# Patient Record
Sex: FEMALE | Race: ASIAN | NOT HISPANIC OR LATINO | Employment: OTHER | URBAN - METROPOLITAN AREA
[De-identification: names, ages, dates, MRNs, and addresses within clinical notes are randomized per-mention and may not be internally consistent; named-entity substitution may affect disease eponyms.]

---

## 2018-06-01 LAB
ABSOL LYMPHOCYTES (HISTORICAL): 1.8 K/UL (ref 0.5–4)
ALBUMIN SERPL BCP-MCNC: 4.4 G/DL (ref 3–5.2)
ALP SERPL-CCNC: 70 U/L (ref 43–122)
ALT SERPL W P-5'-P-CCNC: 30 U/L (ref 9–52)
ANION GAP SERPL CALCULATED.3IONS-SCNC: 9 MMOL/L (ref 5–14)
AST SERPL W P-5'-P-CCNC: 21 U/L (ref 14–36)
BASOPHILS # BLD AUTO: 0 % (ref 0–1)
BASOPHILS # BLD AUTO: 0 K/UL (ref 0–0.1)
BILIRUB SERPL-MCNC: 0.6 MG/DL
BUN SERPL-MCNC: 18 MG/DL (ref 5–25)
CALCIUM SERPL-MCNC: 9.4 MG/DL (ref 8.4–10.2)
CHLORIDE SERPL-SCNC: 107 MEQ/L (ref 97–108)
CHOLEST SERPL-MCNC: 191 MG/DL
CHOLEST/HDLC SERPL: 2.9 {RATIO}
CK SERPL-CCNC: 122 U/L (ref 30–135)
CO2 SERPL-SCNC: 27 MMOL/L (ref 22–30)
CREATINE, SERUM (HISTORICAL): 0.7 MG/DL (ref 0.6–1.2)
CREATININE, RANDOM URINE (HISTORICAL): 13.5 MG/DL (ref 50–200)
DEPRECATED RDW RBC AUTO: 14.1 %
EGFR (HISTORICAL): >60 ML/MIN/1.73 M2
EOSINOPHIL # BLD AUTO: 0.1 K/UL (ref 0–0.4)
EOSINOPHIL NFR BLD AUTO: 2 % (ref 0–6)
EST. AVERAGE GLUCOSE BLD GHB EST-MCNC: 183 MG/DL
GLUCOSE FASTING (HISTORICAL): 160 MG/DL (ref 70–99)
HBA1C MFR BLD HPLC: 8 %
HCT VFR BLD AUTO: 39.2 % (ref 36–46)
HDLC SERPL-MCNC: 65 MG/DL
HGB BLD-MCNC: 13.1 G/DL (ref 12–16)
LDL/HDL RATIO (HISTORICAL): 1.6
LDLC SERPL CALC-MCNC: 101 MG/DL
LYMPHOCYTES NFR BLD AUTO: 24 % (ref 25–45)
MCH RBC QN AUTO: 30.1 PG (ref 26–34)
MCHC RBC AUTO-ENTMCNC: 33.4 % (ref 31–36)
MCV RBC AUTO: 90 FL (ref 80–100)
MICROALBUM.,U,RANDOM (HISTORICAL): <0.6 MG/DL
MICROALBUMIN/CREATININE RATIO (HISTORICAL): ABNORMAL
MONOCYTES # BLD AUTO: 0.5 K/UL (ref 0.2–0.9)
MONOCYTES NFR BLD AUTO: 6 % (ref 1–10)
NEUTROPHILS ABS COUNT (HISTORICAL): 5 K/UL (ref 1.8–7.8)
NEUTS SEG NFR BLD AUTO: 68 % (ref 45–65)
PLATELET # BLD AUTO: 225 K/MCL (ref 150–450)
POTASSIUM SERPL-SCNC: 4.7 MEQ/L (ref 3.6–5)
RBC # BLD AUTO: 4.35 M/MCL (ref 4–5.2)
SODIUM SERPL-SCNC: 142 MEQ/L (ref 137–147)
TOTAL PROTEIN (HISTORICAL): 7.7 G/DL (ref 5.9–8.4)
TRIGL SERPL-MCNC: 127 MG/DL
VLDLC SERPL CALC-MCNC: 25 MG/DL (ref 0–40)
WBC # BLD AUTO: 7.4 K/MCL (ref 4.5–11)

## 2018-10-10 ENCOUNTER — TRANSCRIBE ORDERS (OUTPATIENT)
Dept: ADMINISTRATIVE | Facility: HOSPITAL | Age: 73
End: 2018-10-10

## 2018-10-10 DIAGNOSIS — Z12.39 SCREENING BREAST EXAMINATION: Primary | ICD-10-CM

## 2018-11-23 ENCOUNTER — HOSPITAL ENCOUNTER (OUTPATIENT)
Dept: RADIOLOGY | Age: 73
Discharge: HOME | End: 2018-11-23
Payer: COMMERCIAL

## 2018-11-23 VITALS — BODY MASS INDEX: 23.21 KG/M2 | HEIGHT: 63 IN | WEIGHT: 131 LBS

## 2018-11-23 DIAGNOSIS — Z12.39 SCREENING BREAST EXAMINATION: ICD-10-CM

## 2018-11-23 PROCEDURE — 77063 BREAST TOMOSYNTHESIS BI: CPT

## 2018-11-23 PROCEDURE — 77067 SCR MAMMO BI INCL CAD: CPT

## 2018-12-07 ENCOUNTER — HOSPITAL ENCOUNTER (OUTPATIENT)
Dept: ULTRASOUND IMAGING | Facility: CLINIC | Age: 73
Discharge: HOME | End: 2018-12-07
Payer: COMMERCIAL

## 2018-12-07 ENCOUNTER — HOSPITAL ENCOUNTER (OUTPATIENT)
Dept: MAMMOGRAPHY | Facility: CLINIC | Age: 73
Discharge: HOME | End: 2018-12-07
Payer: COMMERCIAL

## 2018-12-07 ENCOUNTER — TRANSCRIBE ORDERS (OUTPATIENT)
Dept: ADMINISTRATIVE | Facility: HOSPITAL | Age: 73
End: 2018-12-07

## 2018-12-07 DIAGNOSIS — R92.8 ABNORMAL MAMMOGRAM: ICD-10-CM

## 2018-12-07 DIAGNOSIS — R92.8 FOLLOW-UP EXAMINATION OF ABNORMAL MAMMOGRAM: Primary | ICD-10-CM

## 2018-12-07 PROCEDURE — 76642 ULTRASOUND BREAST LIMITED: CPT

## 2018-12-07 PROCEDURE — 77066 DX MAMMO INCL CAD BI: CPT

## 2018-12-07 PROCEDURE — G0279 TOMOSYNTHESIS, MAMMO: HCPCS

## 2019-05-17 ENCOUNTER — HOSPITAL ENCOUNTER (OUTPATIENT)
Dept: MAMMOGRAPHY | Facility: CLINIC | Age: 74
Discharge: HOME | End: 2019-05-17
Payer: COMMERCIAL

## 2019-05-17 ENCOUNTER — TRANSCRIBE ORDERS (OUTPATIENT)
Dept: ADMINISTRATIVE | Facility: HOSPITAL | Age: 74
End: 2019-05-17

## 2019-05-17 ENCOUNTER — HOSPITAL ENCOUNTER (OUTPATIENT)
Dept: ULTRASOUND IMAGING | Facility: CLINIC | Age: 74
Discharge: HOME | End: 2019-05-17
Payer: COMMERCIAL

## 2019-05-17 VITALS — HEIGHT: 63 IN | WEIGHT: 131 LBS | BODY MASS INDEX: 23.21 KG/M2

## 2019-05-17 DIAGNOSIS — R92.8 ABNORMAL MAMMOGRAM: Primary | ICD-10-CM

## 2019-05-17 DIAGNOSIS — R92.8 FOLLOW-UP EXAMINATION OF ABNORMAL MAMMOGRAM: ICD-10-CM

## 2019-05-17 PROCEDURE — 77066 DX MAMMO INCL CAD BI: CPT

## 2019-05-17 PROCEDURE — 76642 ULTRASOUND BREAST LIMITED: CPT

## 2019-05-17 PROCEDURE — G0279 TOMOSYNTHESIS, MAMMO: HCPCS

## 2019-09-05 PROBLEM — K21.9 GASTROESOPHAGEAL REFLUX DISEASE WITHOUT ESOPHAGITIS: Status: ACTIVE | Noted: 2019-09-05

## 2019-09-05 PROBLEM — E78.2 MIXED HYPERLIPIDEMIA: Status: ACTIVE | Noted: 2019-09-05

## 2019-09-05 PROBLEM — I10 ESSENTIAL HYPERTENSION: Status: ACTIVE | Noted: 2019-09-05

## 2019-09-05 PROBLEM — E11.9 DIABETES MELLITUS (HCC): Status: ACTIVE | Noted: 2019-09-05

## 2019-09-05 PROBLEM — M17.0 OSTEOARTHRITIS OF BOTH KNEES: Status: ACTIVE | Noted: 2019-09-05

## 2019-09-05 PROBLEM — J06.9 VIRAL UPPER RESPIRATORY TRACT INFECTION: Status: ACTIVE | Noted: 2019-09-05

## 2019-12-03 ENCOUNTER — HOSPITAL ENCOUNTER (OUTPATIENT)
Dept: MAMMOGRAPHY | Facility: CLINIC | Age: 74
Discharge: HOME | End: 2019-12-03
Payer: COMMERCIAL

## 2019-12-03 ENCOUNTER — HOSPITAL ENCOUNTER (OUTPATIENT)
Dept: ULTRASOUND IMAGING | Facility: CLINIC | Age: 74
Discharge: HOME | End: 2019-12-03
Payer: COMMERCIAL

## 2019-12-03 ENCOUNTER — TRANSCRIBE ORDERS (OUTPATIENT)
Dept: MAMMOGRAPHY | Facility: CLINIC | Age: 74
End: 2019-12-03

## 2019-12-03 VITALS — BODY MASS INDEX: 26.9 KG/M2 | WEIGHT: 137 LBS | HEIGHT: 60 IN

## 2019-12-03 DIAGNOSIS — R92.8 ABNORMAL ULTRASOUND OF BREAST: Primary | ICD-10-CM

## 2019-12-03 DIAGNOSIS — R92.8 ABNORMAL MAMMOGRAM: ICD-10-CM

## 2019-12-03 PROCEDURE — G0279 TOMOSYNTHESIS, MAMMO: HCPCS

## 2019-12-03 PROCEDURE — 76642 ULTRASOUND BREAST LIMITED: CPT

## 2019-12-03 PROCEDURE — 77066 DX MAMMO INCL CAD BI: CPT

## 2020-01-08 ENCOUNTER — LAB (OUTPATIENT)
Dept: LAB | Facility: HOSPITAL | Age: 75
End: 2020-01-08
Payer: COMMERCIAL

## 2020-01-08 ENCOUNTER — TRANSCRIBE ORDERS (OUTPATIENT)
Dept: ADMINISTRATIVE | Facility: HOSPITAL | Age: 75
End: 2020-01-08

## 2020-01-08 DIAGNOSIS — G44.52 NEW DAILY PERSISTENT HEADACHE: ICD-10-CM

## 2020-01-08 DIAGNOSIS — E78.2 MIXED HYPERLIPIDEMIA: ICD-10-CM

## 2020-01-08 DIAGNOSIS — I10 ESSENTIAL HYPERTENSION: ICD-10-CM

## 2020-01-08 DIAGNOSIS — E11.69 TYPE 2 DIABETES MELLITUS WITH OTHER SPECIFIED COMPLICATION, WITHOUT LONG-TERM CURRENT USE OF INSULIN (HCC): ICD-10-CM

## 2020-01-08 LAB
ALBUMIN SERPL BCP-MCNC: 4.3 G/DL (ref 3–5.2)
ALP SERPL-CCNC: 63 U/L (ref 43–122)
ALT SERPL W P-5'-P-CCNC: 23 U/L (ref 9–52)
ANION GAP SERPL CALCULATED.3IONS-SCNC: 9 MMOL/L (ref 5–14)
AST SERPL W P-5'-P-CCNC: 20 U/L (ref 14–36)
BASOPHILS # BLD AUTO: 0 THOUSANDS/ΜL (ref 0–0.1)
BASOPHILS NFR BLD AUTO: 0 % (ref 0–1)
BILIRUB SERPL-MCNC: 0.5 MG/DL
BUN SERPL-MCNC: 17 MG/DL (ref 5–25)
CALCIUM SERPL-MCNC: 9.2 MG/DL (ref 8.4–10.2)
CHLORIDE SERPL-SCNC: 106 MMOL/L (ref 97–108)
CHOLEST SERPL-MCNC: 179 MG/DL
CK SERPL-CCNC: 71 U/L (ref 30–135)
CO2 SERPL-SCNC: 24 MMOL/L (ref 22–30)
CREAT SERPL-MCNC: 0.93 MG/DL (ref 0.6–1.2)
EOSINOPHIL # BLD AUTO: 0.1 THOUSAND/ΜL (ref 0–0.4)
EOSINOPHIL NFR BLD AUTO: 1 % (ref 0–6)
ERYTHROCYTE [DISTWIDTH] IN BLOOD BY AUTOMATED COUNT: 13.6 %
EST. AVERAGE GLUCOSE BLD GHB EST-MCNC: 169 MG/DL
GFR SERPL CREATININE-BSD FRML MDRD: 60 ML/MIN/1.73SQ M
GLUCOSE P FAST SERPL-MCNC: 144 MG/DL (ref 70–99)
HBA1C MFR BLD: 7.5 % (ref 4.2–6.3)
HCT VFR BLD AUTO: 41 % (ref 36–46)
HDLC SERPL-MCNC: 44 MG/DL
HGB BLD-MCNC: 13.7 G/DL (ref 12–16)
LDLC SERPL CALC-MCNC: 109 MG/DL
LYMPHOCYTES # BLD AUTO: 1.7 THOUSANDS/ΜL (ref 0.5–4)
LYMPHOCYTES NFR BLD AUTO: 23 % (ref 25–45)
MCH RBC QN AUTO: 30.5 PG (ref 26–34)
MCHC RBC AUTO-ENTMCNC: 33.5 G/DL (ref 31–36)
MCV RBC AUTO: 91 FL (ref 80–100)
MONOCYTES # BLD AUTO: 0.4 THOUSAND/ΜL (ref 0.2–0.9)
MONOCYTES NFR BLD AUTO: 5 % (ref 1–10)
NEUTROPHILS # BLD AUTO: 5.2 THOUSANDS/ΜL (ref 1.8–7.8)
NEUTS SEG NFR BLD AUTO: 70 % (ref 45–65)
NONHDLC SERPL-MCNC: 135 MG/DL
PLATELET # BLD AUTO: 244 THOUSANDS/UL (ref 150–450)
PMV BLD AUTO: 7.6 FL (ref 8.9–12.7)
POTASSIUM SERPL-SCNC: 4.2 MMOL/L (ref 3.6–5)
PROT SERPL-MCNC: 7.7 G/DL (ref 5.9–8.4)
RBC # BLD AUTO: 4.49 MILLION/UL (ref 4–5.2)
SODIUM SERPL-SCNC: 139 MMOL/L (ref 137–147)
TRIGL SERPL-MCNC: 131 MG/DL
TSH SERPL DL<=0.05 MIU/L-ACNC: 1.43 UIU/ML (ref 0.47–4.68)
WBC # BLD AUTO: 7.5 THOUSAND/UL (ref 4.5–11)

## 2020-01-08 PROCEDURE — 36415 COLL VENOUS BLD VENIPUNCTURE: CPT

## 2020-01-08 PROCEDURE — 85025 COMPLETE CBC W/AUTO DIFF WBC: CPT

## 2020-01-08 PROCEDURE — 83036 HEMOGLOBIN GLYCOSYLATED A1C: CPT

## 2020-01-08 PROCEDURE — 80061 LIPID PANEL: CPT

## 2020-01-08 PROCEDURE — 82550 ASSAY OF CK (CPK): CPT

## 2020-01-08 PROCEDURE — 80053 COMPREHEN METABOLIC PANEL: CPT

## 2020-01-08 PROCEDURE — 84443 ASSAY THYROID STIM HORMONE: CPT

## 2020-01-13 NOTE — RESULT ENCOUNTER NOTE
Please call the patient regarding her abnormal result  Sugar high add invokana 100 #90 once daily and 3 ref

## 2021-01-01 ENCOUNTER — APPOINTMENT (EMERGENCY)
Dept: RADIOLOGY | Facility: HOSPITAL | Age: 76
DRG: 871 | End: 2021-01-01
Payer: COMMERCIAL

## 2021-01-01 ENCOUNTER — APPOINTMENT (INPATIENT)
Dept: CT IMAGING | Facility: HOSPITAL | Age: 76
DRG: 871 | End: 2021-01-01
Payer: COMMERCIAL

## 2021-01-01 ENCOUNTER — HOSPITAL ENCOUNTER (OUTPATIENT)
Dept: ULTRASOUND IMAGING | Facility: CLINIC | Age: 76
Discharge: HOME | End: 2021-04-29
Payer: COMMERCIAL

## 2021-01-01 ENCOUNTER — APPOINTMENT (OUTPATIENT)
Dept: LAB | Facility: HOSPITAL | Age: 76
End: 2021-01-01
Payer: COMMERCIAL

## 2021-01-01 ENCOUNTER — OFFICE VISIT (OUTPATIENT)
Dept: URGENT CARE | Facility: MEDICAL CENTER | Age: 76
DRG: 871 | End: 2021-01-01
Payer: COMMERCIAL

## 2021-01-01 ENCOUNTER — HOSPITAL ENCOUNTER (OUTPATIENT)
Dept: MRI IMAGING | Facility: HOSPITAL | Age: 76
Discharge: HOME | End: 2021-07-11
Payer: COMMERCIAL

## 2021-01-01 ENCOUNTER — HOSPITAL ENCOUNTER (OUTPATIENT)
Dept: MAMMOGRAPHY | Facility: CLINIC | Age: 76
Discharge: HOME | End: 2021-04-29
Payer: COMMERCIAL

## 2021-01-01 ENCOUNTER — HOSPITAL ENCOUNTER (INPATIENT)
Facility: HOSPITAL | Age: 76
LOS: 10 days | DRG: 871 | End: 2022-01-07
Attending: EMERGENCY MEDICINE | Admitting: INTERNAL MEDICINE
Payer: COMMERCIAL

## 2021-01-01 VITALS — BODY MASS INDEX: 26.31 KG/M2 | HEIGHT: 60 IN | WEIGHT: 134 LBS

## 2021-01-01 VITALS — OXYGEN SATURATION: 24 %

## 2021-01-01 DIAGNOSIS — R42 DIZZINESS: ICD-10-CM

## 2021-01-01 DIAGNOSIS — R51.9 DAILY HEADACHE: ICD-10-CM

## 2021-01-01 DIAGNOSIS — J96.01 ACUTE RESPIRATORY FAILURE WITH HYPOXIA (HCC): ICD-10-CM

## 2021-01-01 DIAGNOSIS — I10 ESSENTIAL HYPERTENSION: ICD-10-CM

## 2021-01-01 DIAGNOSIS — R92.8 ABNORMAL ULTRASOUND OF BREAST: ICD-10-CM

## 2021-01-01 DIAGNOSIS — E78.2 MIXED HYPERLIPIDEMIA: ICD-10-CM

## 2021-01-01 DIAGNOSIS — B37.0 ORAL CANDIDIASIS: ICD-10-CM

## 2021-01-01 DIAGNOSIS — R06.02 SHORTNESS OF BREATH: Primary | ICD-10-CM

## 2021-01-01 DIAGNOSIS — J12.82 PNEUMONIA DUE TO COVID-19 VIRUS: Primary | ICD-10-CM

## 2021-01-01 DIAGNOSIS — E11.69 TYPE 2 DIABETES MELLITUS WITH OTHER SPECIFIED COMPLICATION, WITHOUT LONG-TERM CURRENT USE OF INSULIN (HCC): ICD-10-CM

## 2021-01-01 DIAGNOSIS — U07.1 PNEUMONIA DUE TO COVID-19 VIRUS: Primary | ICD-10-CM

## 2021-01-01 LAB
2HR DELTA HS TROPONIN: 1 NG/L
4HR DELTA HS TROPONIN: -17 NG/L
ABO GROUP BLD: NORMAL
ALBUMIN SERPL BCP-MCNC: 1.1 G/DL (ref 3.5–5)
ALBUMIN SERPL BCP-MCNC: 1.5 G/DL (ref 3.5–5)
ALBUMIN SERPL BCP-MCNC: 1.7 G/DL (ref 3.5–5)
ALBUMIN SERPL BCP-MCNC: 2 G/DL (ref 3.5–5)
ALBUMIN SERPL BCP-MCNC: 4.6 G/DL (ref 3–5.2)
ALP SERPL-CCNC: 130 U/L (ref 46–116)
ALP SERPL-CCNC: 131 U/L (ref 46–116)
ALP SERPL-CCNC: 146 U/L (ref 46–116)
ALP SERPL-CCNC: 66 U/L (ref 43–122)
ALP SERPL-CCNC: 89 U/L (ref 46–116)
ALT SERPL W P-5'-P-CCNC: 16 U/L (ref 12–78)
ALT SERPL W P-5'-P-CCNC: 20 U/L (ref 12–78)
ALT SERPL W P-5'-P-CCNC: 24 U/L (ref 12–78)
ALT SERPL W P-5'-P-CCNC: 26 U/L (ref 12–78)
ALT SERPL W P-5'-P-CCNC: 30 U/L
ANION GAP SERPL CALCULATED.3IONS-SCNC: 10 MMOL/L (ref 5–14)
ANION GAP SERPL CALCULATED.3IONS-SCNC: 14 MMOL/L (ref 4–13)
ANION GAP SERPL CALCULATED.3IONS-SCNC: 15 MMOL/L (ref 4–13)
ANION GAP SERPL CALCULATED.3IONS-SCNC: 17 MMOL/L (ref 4–13)
ANION GAP SERPL CALCULATED.3IONS-SCNC: 18 MMOL/L (ref 4–13)
APTT PPP: 104 SECONDS (ref 23–37)
APTT PPP: 130 SECONDS (ref 23–37)
APTT PPP: 134 SECONDS (ref 23–37)
APTT PPP: 193 SECONDS (ref 23–37)
APTT PPP: 49 SECONDS (ref 23–37)
APTT PPP: 57 SECONDS (ref 23–37)
APTT PPP: 59 SECONDS (ref 23–37)
APTT PPP: 62 SECONDS (ref 23–37)
APTT PPP: >210 SECONDS (ref 23–37)
AST SERPL W P-5'-P-CCNC: 17 U/L (ref 5–45)
AST SERPL W P-5'-P-CCNC: 19 U/L (ref 5–45)
AST SERPL W P-5'-P-CCNC: 24 U/L (ref 5–45)
AST SERPL W P-5'-P-CCNC: 28 U/L (ref 5–45)
AST SERPL W P-5'-P-CCNC: 32 U/L (ref 14–36)
ATRIAL RATE: 87 BPM
ATRIAL RATE: 98 BPM
B BURGDOR IGG+IGM SER-ACNC: 21
BASOPHILS # BLD AUTO: 0 THOUSANDS/ΜL (ref 0–0.1)
BASOPHILS # BLD MANUAL: 0 THOUSAND/UL (ref 0–0.1)
BASOPHILS NFR BLD AUTO: 0 % (ref 0–1)
BASOPHILS NFR MAR MANUAL: 0 % (ref 0–1)
BILIRUB SERPL-MCNC: 0.37 MG/DL (ref 0.2–1)
BILIRUB SERPL-MCNC: 0.43 MG/DL (ref 0.2–1)
BILIRUB SERPL-MCNC: 0.44 MG/DL (ref 0.2–1)
BILIRUB SERPL-MCNC: 0.51 MG/DL
BILIRUB SERPL-MCNC: 0.71 MG/DL (ref 0.2–1)
BUN SERPL-MCNC: 22 MG/DL (ref 5–25)
BUN SERPL-MCNC: 33 MG/DL (ref 5–25)
BUN SERPL-MCNC: 33 MG/DL (ref 5–25)
BUN SERPL-MCNC: 36 MG/DL (ref 5–25)
BUN SERPL-MCNC: 36 MG/DL (ref 5–25)
CALCIUM ALBUM COR SERPL-MCNC: 7.7 MG/DL (ref 8.3–10.1)
CALCIUM ALBUM COR SERPL-MCNC: 9.2 MG/DL (ref 8.3–10.1)
CALCIUM ALBUM COR SERPL-MCNC: 9.5 MG/DL (ref 8.3–10.1)
CALCIUM ALBUM COR SERPL-MCNC: 9.6 MG/DL (ref 8.3–10.1)
CALCIUM SERPL-MCNC: 5.4 MG/DL (ref 8.3–10.1)
CALCIUM SERPL-MCNC: 7.5 MG/DL (ref 8.3–10.1)
CALCIUM SERPL-MCNC: 7.6 MG/DL (ref 8.3–10.1)
CALCIUM SERPL-MCNC: 7.8 MG/DL (ref 8.3–10.1)
CALCIUM SERPL-MCNC: 9.6 MG/DL (ref 8.4–10.2)
CARDIAC TROPONIN I PNL SERPL HS: 40 NG/L
CARDIAC TROPONIN I PNL SERPL HS: 57 NG/L
CARDIAC TROPONIN I PNL SERPL HS: 58 NG/L
CHLORIDE SERPL-SCNC: 100 MMOL/L (ref 97–108)
CHLORIDE SERPL-SCNC: 102 MMOL/L (ref 100–108)
CHLORIDE SERPL-SCNC: 107 MMOL/L (ref 100–108)
CHLORIDE SERPL-SCNC: 109 MMOL/L (ref 100–108)
CHLORIDE SERPL-SCNC: 89 MMOL/L (ref 100–108)
CHOLEST SERPL-MCNC: 206 MG/DL
CK SERPL-CCNC: 60 U/L (ref 26–192)
CO2 SERPL-SCNC: 16 MMOL/L (ref 21–32)
CO2 SERPL-SCNC: 24 MMOL/L (ref 21–32)
CO2 SERPL-SCNC: 24 MMOL/L (ref 21–32)
CO2 SERPL-SCNC: 25 MMOL/L (ref 21–32)
CO2 SERPL-SCNC: 29 MMOL/L (ref 22–30)
CREAT SERPL-MCNC: 0.76 MG/DL (ref 0.6–1.3)
CREAT SERPL-MCNC: 0.99 MG/DL (ref 0.6–1.3)
CREAT SERPL-MCNC: 1.13 MG/DL (ref 0.6–1.3)
CREAT SERPL-MCNC: 1.15 MG/DL (ref 0.6–1.2)
CREAT SERPL-MCNC: 1.32 MG/DL (ref 0.6–1.3)
CRP SERPL QL: 135 MG/L
CRP SERPL QL: 249 MG/L
CRP SERPL QL: 252.5 MG/L
CRP SERPL QL: 469.8 MG/L
D DIMER PPP FEU-MCNC: 10.35 UG/ML FEU
D DIMER PPP FEU-MCNC: 5.15 UG/ML FEU
D DIMER PPP FEU-MCNC: 5.43 UG/ML FEU
EOSINOPHIL # BLD AUTO: 0.1 THOUSAND/ΜL (ref 0–0.4)
EOSINOPHIL # BLD MANUAL: 0 THOUSAND/UL (ref 0–0.4)
EOSINOPHIL NFR BLD AUTO: 1 % (ref 0–6)
EOSINOPHIL NFR BLD MANUAL: 0 % (ref 0–6)
ERYTHROCYTE [DISTWIDTH] IN BLOOD BY AUTOMATED COUNT: 14.2 %
ERYTHROCYTE [DISTWIDTH] IN BLOOD BY AUTOMATED COUNT: 14.6 % (ref 11.6–15.1)
ERYTHROCYTE [DISTWIDTH] IN BLOOD BY AUTOMATED COUNT: 15 % (ref 11.6–15.1)
ERYTHROCYTE [DISTWIDTH] IN BLOOD BY AUTOMATED COUNT: 15.3 % (ref 11.6–15.1)
ERYTHROCYTE [DISTWIDTH] IN BLOOD BY AUTOMATED COUNT: 16.5 % (ref 11.6–15.1)
EST. AVERAGE GLUCOSE BLD GHB EST-MCNC: 217 MG/DL
FIBRINOGEN PPP-MCNC: 779 MG/DL (ref 227–495)
GFR SERPL CREATININE-BSD FRML MDRD: 38 ML/MIN/1.73SQ M
GFR SERPL CREATININE-BSD FRML MDRD: 46 ML/MIN/1.73SQ M
GFR SERPL CREATININE-BSD FRML MDRD: 46 ML/MIN/1.73SQ M
GFR SERPL CREATININE-BSD FRML MDRD: 55 ML/MIN/1.73SQ M
GFR SERPL CREATININE-BSD FRML MDRD: 75 ML/MIN/1.73SQ M
GLUCOSE P FAST SERPL-MCNC: 136 MG/DL (ref 70–99)
GLUCOSE SERPL-MCNC: 143 MG/DL (ref 65–140)
GLUCOSE SERPL-MCNC: 191 MG/DL (ref 65–140)
GLUCOSE SERPL-MCNC: 210 MG/DL (ref 65–140)
GLUCOSE SERPL-MCNC: 216 MG/DL (ref 65–140)
GLUCOSE SERPL-MCNC: 230 MG/DL (ref 65–140)
GLUCOSE SERPL-MCNC: 232 MG/DL (ref 65–140)
GLUCOSE SERPL-MCNC: 233 MG/DL (ref 65–140)
GLUCOSE SERPL-MCNC: 239 MG/DL (ref 65–140)
GLUCOSE SERPL-MCNC: 248 MG/DL (ref 65–140)
GLUCOSE SERPL-MCNC: 266 MG/DL (ref 65–140)
GLUCOSE SERPL-MCNC: 268 MG/DL (ref 65–140)
GLUCOSE SERPL-MCNC: 284 MG/DL (ref 65–140)
GLUCOSE SERPL-MCNC: 294 MG/DL (ref 65–140)
GLUCOSE SERPL-MCNC: 297 MG/DL (ref 65–140)
GLUCOSE SERPL-MCNC: 330 MG/DL (ref 65–140)
GLUCOSE SERPL-MCNC: 371 MG/DL (ref 65–140)
GLUCOSE SERPL-MCNC: 409 MG/DL (ref 65–140)
HBA1C MFR BLD: 9.2 %
HCT VFR BLD AUTO: 34.6 % (ref 34.8–46.1)
HCT VFR BLD AUTO: 36.6 % (ref 34.8–46.1)
HCT VFR BLD AUTO: 37.9 % (ref 34.8–46.1)
HCT VFR BLD AUTO: 42 % (ref 36–46)
HCT VFR BLD AUTO: 44 % (ref 34.8–46.1)
HDLC SERPL-MCNC: 45 MG/DL
HGB BLD-MCNC: 11.7 G/DL (ref 11.5–15.4)
HGB BLD-MCNC: 12.3 G/DL (ref 11.5–15.4)
HGB BLD-MCNC: 12.8 G/DL (ref 11.5–15.4)
HGB BLD-MCNC: 14.2 G/DL (ref 12–16)
HGB BLD-MCNC: 15.3 G/DL (ref 11.5–15.4)
INR PPP: 1.25 (ref 0.84–1.19)
INR PPP: 1.51 (ref 0.84–1.19)
LACTATE SERPL-SCNC: 1.5 MMOL/L (ref 0.5–2)
LACTATE SERPL-SCNC: 2 MMOL/L (ref 0.5–2)
LACTATE SERPL-SCNC: 2 MMOL/L (ref 0.5–2)
LACTATE SERPL-SCNC: 3.3 MMOL/L (ref 0.5–2)
LDLC SERPL CALC-MCNC: 132 MG/DL
LYMPHOCYTES # BLD AUTO: 0.64 THOUSAND/UL (ref 0.6–4.47)
LYMPHOCYTES # BLD AUTO: 1.7 THOUSANDS/ΜL (ref 0.5–4)
LYMPHOCYTES # BLD AUTO: 3 % (ref 14–44)
LYMPHOCYTES NFR BLD AUTO: 19 % (ref 25–45)
MCH RBC QN AUTO: 28.9 PG (ref 26.8–34.3)
MCH RBC QN AUTO: 29 PG (ref 26.8–34.3)
MCH RBC QN AUTO: 29.1 PG (ref 26.8–34.3)
MCH RBC QN AUTO: 29.7 PG (ref 26.8–34.3)
MCH RBC QN AUTO: 30.2 PG (ref 26–34)
MCHC RBC AUTO-ENTMCNC: 33.6 G/DL (ref 31.4–37.4)
MCHC RBC AUTO-ENTMCNC: 33.7 G/DL (ref 31–36)
MCHC RBC AUTO-ENTMCNC: 33.8 G/DL (ref 31.4–37.4)
MCHC RBC AUTO-ENTMCNC: 33.8 G/DL (ref 31.4–37.4)
MCHC RBC AUTO-ENTMCNC: 34.8 G/DL (ref 31.4–37.4)
MCV RBC AUTO: 83 FL (ref 82–98)
MCV RBC AUTO: 86 FL (ref 82–98)
MCV RBC AUTO: 87 FL (ref 82–98)
MCV RBC AUTO: 88 FL (ref 82–98)
MCV RBC AUTO: 90 FL (ref 80–100)
MONOCYTES # BLD AUTO: 0.21 THOUSAND/UL (ref 0–1.22)
MONOCYTES # BLD AUTO: 0.4 THOUSAND/ΜL (ref 0.2–0.9)
MONOCYTES NFR BLD AUTO: 5 % (ref 1–10)
MONOCYTES NFR BLD: 1 % (ref 4–12)
NEUTROPHILS # BLD AUTO: 6.8 THOUSANDS/ΜL (ref 1.8–7.8)
NEUTROPHILS # BLD MANUAL: 20.57 THOUSAND/UL (ref 1.85–7.62)
NEUTS BAND NFR BLD MANUAL: 2 % (ref 0–8)
NEUTS SEG NFR BLD AUTO: 75 % (ref 45–65)
NEUTS SEG NFR BLD AUTO: 94 % (ref 43–75)
NONHDLC SERPL-MCNC: 161 MG/DL
NRBC BLD AUTO-RTO: 2 /100 WBC (ref 0–2)
NT-PROBNP SERPL-MCNC: 4068 PG/ML
P AXIS: 35 DEGREES
P AXIS: 55 DEGREES
PLATELET # BLD AUTO: 154 THOUSANDS/UL (ref 149–390)
PLATELET # BLD AUTO: 158 THOUSANDS/UL (ref 149–390)
PLATELET # BLD AUTO: 179 THOUSANDS/UL (ref 149–390)
PLATELET # BLD AUTO: 191 THOUSANDS/UL (ref 149–390)
PLATELET # BLD AUTO: 259 THOUSANDS/UL (ref 150–450)
PLATELET BLD QL SMEAR: ADEQUATE
PMV BLD AUTO: 10 FL (ref 8.9–12.7)
PMV BLD AUTO: 10.2 FL (ref 8.9–12.7)
PMV BLD AUTO: 10.3 FL (ref 8.9–12.7)
PMV BLD AUTO: 7.4 FL (ref 8.9–12.7)
PMV BLD AUTO: 9.7 FL (ref 8.9–12.7)
POLYCHROMASIA BLD QL SMEAR: PRESENT
POTASSIUM SERPL-SCNC: 3.1 MMOL/L (ref 3.5–5.3)
POTASSIUM SERPL-SCNC: 3.2 MMOL/L (ref 3.6–5)
POTASSIUM SERPL-SCNC: 4 MMOL/L (ref 3.5–5.3)
POTASSIUM SERPL-SCNC: 4.1 MMOL/L (ref 3.5–5.3)
POTASSIUM SERPL-SCNC: 4.4 MMOL/L (ref 3.5–5.3)
PR INTERVAL: 132 MS
PR INTERVAL: 132 MS
PROCALCITONIN SERPL-MCNC: 0.49 NG/ML
PROCALCITONIN SERPL-MCNC: 0.7 NG/ML
PROCALCITONIN SERPL-MCNC: 0.89 NG/ML
PROCALCITONIN SERPL-MCNC: 0.98 NG/ML
PROT SERPL-MCNC: 4.5 G/DL (ref 6.4–8.2)
PROT SERPL-MCNC: 5.9 G/DL (ref 6.4–8.2)
PROT SERPL-MCNC: 6 G/DL (ref 6.4–8.2)
PROT SERPL-MCNC: 7.3 G/DL (ref 6.4–8.2)
PROT SERPL-MCNC: 8.6 G/DL (ref 5.9–8.4)
PROTHROMBIN TIME: 15.3 SECONDS (ref 11.6–14.5)
PROTHROMBIN TIME: 17.7 SECONDS (ref 11.6–14.5)
QRS AXIS: -4 DEGREES
QRS AXIS: 57 DEGREES
QRSD INTERVAL: 72 MS
QRSD INTERVAL: 78 MS
QT INTERVAL: 336 MS
QT INTERVAL: 374 MS
QTC INTERVAL: 404 MS
QTC INTERVAL: 477 MS
RBC # BLD AUTO: 3.94 MILLION/UL (ref 3.81–5.12)
RBC # BLD AUTO: 4.22 MILLION/UL (ref 3.81–5.12)
RBC # BLD AUTO: 4.43 MILLION/UL (ref 3.81–5.12)
RBC # BLD AUTO: 4.69 MILLION/UL (ref 4–5.2)
RBC # BLD AUTO: 5.28 MILLION/UL (ref 3.81–5.12)
RBC MORPH BLD: NORMAL
RH BLD: POSITIVE
SODIUM SERPL-SCNC: 130 MMOL/L (ref 136–145)
SODIUM SERPL-SCNC: 139 MMOL/L (ref 137–147)
SODIUM SERPL-SCNC: 141 MMOL/L (ref 136–145)
SODIUM SERPL-SCNC: 143 MMOL/L (ref 136–145)
SODIUM SERPL-SCNC: 146 MMOL/L (ref 136–145)
T WAVE AXIS: -23 DEGREES
T WAVE AXIS: 174 DEGREES
TRIGL SERPL-MCNC: 145 MG/DL
TSH SERPL DL<=0.05 MIU/L-ACNC: 0.81 UIU/ML (ref 0.47–4.68)
VENTRICULAR RATE: 87 BPM
VENTRICULAR RATE: 98 BPM
WBC # BLD AUTO: 17.45 THOUSAND/UL (ref 4.31–10.16)
WBC # BLD AUTO: 17.47 THOUSAND/UL (ref 4.31–10.16)
WBC # BLD AUTO: 20.19 THOUSAND/UL (ref 4.31–10.16)
WBC # BLD AUTO: 21.43 THOUSAND/UL (ref 4.31–10.16)
WBC # BLD AUTO: 9.1 THOUSAND/UL (ref 4.5–11)

## 2021-01-01 PROCEDURE — 85379 FIBRIN DEGRADATION QUANT: CPT | Performed by: PHYSICIAN ASSISTANT

## 2021-01-01 PROCEDURE — 83605 ASSAY OF LACTIC ACID: CPT | Performed by: PHYSICIAN ASSISTANT

## 2021-01-01 PROCEDURE — 85027 COMPLETE CBC AUTOMATED: CPT | Performed by: PHYSICIAN ASSISTANT

## 2021-01-01 PROCEDURE — 96365 THER/PROPH/DIAG IV INF INIT: CPT

## 2021-01-01 PROCEDURE — 84145 PROCALCITONIN (PCT): CPT | Performed by: EMERGENCY MEDICINE

## 2021-01-01 PROCEDURE — 84145 PROCALCITONIN (PCT): CPT | Performed by: INTERNAL MEDICINE

## 2021-01-01 PROCEDURE — 76642 ULTRASOUND BREAST LIMITED: CPT

## 2021-01-01 PROCEDURE — 85730 THROMBOPLASTIN TIME PARTIAL: CPT | Performed by: INTERNAL MEDICINE

## 2021-01-01 PROCEDURE — NC001 PR NO CHARGE: Performed by: NURSE PRACTITIONER

## 2021-01-01 PROCEDURE — 71275 CT ANGIOGRAPHY CHEST: CPT

## 2021-01-01 PROCEDURE — 85610 PROTHROMBIN TIME: CPT | Performed by: INTERNAL MEDICINE

## 2021-01-01 PROCEDURE — 82550 ASSAY OF CK (CPK): CPT | Performed by: PHYSICIAN ASSISTANT

## 2021-01-01 PROCEDURE — 94660 CPAP INITIATION&MGMT: CPT

## 2021-01-01 PROCEDURE — 85379 FIBRIN DEGRADATION QUANT: CPT | Performed by: INTERNAL MEDICINE

## 2021-01-01 PROCEDURE — 87040 BLOOD CULTURE FOR BACTERIA: CPT | Performed by: EMERGENCY MEDICINE

## 2021-01-01 PROCEDURE — 97167 OT EVAL HIGH COMPLEX 60 MIN: CPT

## 2021-01-01 PROCEDURE — 86140 C-REACTIVE PROTEIN: CPT | Performed by: PHYSICIAN ASSISTANT

## 2021-01-01 PROCEDURE — 85007 BL SMEAR W/DIFF WBC COUNT: CPT | Performed by: EMERGENCY MEDICINE

## 2021-01-01 PROCEDURE — 84443 ASSAY THYROID STIM HORMONE: CPT

## 2021-01-01 PROCEDURE — 86900 BLOOD TYPING SEROLOGIC ABO: CPT | Performed by: PHYSICIAN ASSISTANT

## 2021-01-01 PROCEDURE — 99291 CRITICAL CARE FIRST HOUR: CPT

## 2021-01-01 PROCEDURE — 85027 COMPLETE CBC AUTOMATED: CPT

## 2021-01-01 PROCEDURE — 80061 LIPID PANEL: CPT

## 2021-01-01 PROCEDURE — 99291 CRITICAL CARE FIRST HOUR: CPT | Performed by: INTERNAL MEDICINE

## 2021-01-01 PROCEDURE — 85025 COMPLETE CBC W/AUTO DIFF WBC: CPT | Performed by: EMERGENCY MEDICINE

## 2021-01-01 PROCEDURE — 93010 ELECTROCARDIOGRAM REPORT: CPT | Performed by: INTERNAL MEDICINE

## 2021-01-01 PROCEDURE — 84484 ASSAY OF TROPONIN QUANT: CPT | Performed by: PHYSICIAN ASSISTANT

## 2021-01-01 PROCEDURE — 93005 ELECTROCARDIOGRAM TRACING: CPT

## 2021-01-01 PROCEDURE — 86140 C-REACTIVE PROTEIN: CPT | Performed by: INTERNAL MEDICINE

## 2021-01-01 PROCEDURE — 93005 ELECTROCARDIOGRAM TRACING: CPT | Performed by: PHYSICIAN ASSISTANT

## 2021-01-01 PROCEDURE — 86618 LYME DISEASE ANTIBODY: CPT

## 2021-01-01 PROCEDURE — 82948 REAGENT STRIP/BLOOD GLUCOSE: CPT

## 2021-01-01 PROCEDURE — 85025 COMPLETE CBC W/AUTO DIFF WBC: CPT

## 2021-01-01 PROCEDURE — XW0DXM6 INTRODUCTION OF BARICITINIB INTO MOUTH AND PHARYNX, EXTERNAL APPROACH, NEW TECHNOLOGY GROUP 6: ICD-10-PCS | Performed by: INTERNAL MEDICINE

## 2021-01-01 PROCEDURE — XW033E5 INTRODUCTION OF REMDESIVIR ANTI-INFECTIVE INTO PERIPHERAL VEIN, PERCUTANEOUS APPROACH, NEW TECHNOLOGY GROUP 5: ICD-10-PCS | Performed by: HOSPITALIST

## 2021-01-01 PROCEDURE — 80053 COMPREHEN METABOLIC PANEL: CPT | Performed by: INTERNAL MEDICINE

## 2021-01-01 PROCEDURE — 96360 HYDRATION IV INFUSION INIT: CPT

## 2021-01-01 PROCEDURE — 80053 COMPREHEN METABOLIC PANEL: CPT

## 2021-01-01 PROCEDURE — 94640 AIRWAY INHALATION TREATMENT: CPT

## 2021-01-01 PROCEDURE — 36415 COLL VENOUS BLD VENIPUNCTURE: CPT

## 2021-01-01 PROCEDURE — 99232 SBSQ HOSP IP/OBS MODERATE 35: CPT | Performed by: INTERNAL MEDICINE

## 2021-01-01 PROCEDURE — 36415 COLL VENOUS BLD VENIPUNCTURE: CPT | Performed by: EMERGENCY MEDICINE

## 2021-01-01 PROCEDURE — 99223 1ST HOSP IP/OBS HIGH 75: CPT | Performed by: INTERNAL MEDICINE

## 2021-01-01 PROCEDURE — 80053 COMPREHEN METABOLIC PANEL: CPT | Performed by: PHYSICIAN ASSISTANT

## 2021-01-01 PROCEDURE — G0279 TOMOSYNTHESIS, MAMMO: HCPCS

## 2021-01-01 PROCEDURE — 99291 CRITICAL CARE FIRST HOUR: CPT | Performed by: EMERGENCY MEDICINE

## 2021-01-01 PROCEDURE — 92610 EVALUATE SWALLOWING FUNCTION: CPT

## 2021-01-01 PROCEDURE — 96366 THER/PROPH/DIAG IV INF ADDON: CPT

## 2021-01-01 PROCEDURE — 96361 HYDRATE IV INFUSION ADD-ON: CPT

## 2021-01-01 PROCEDURE — 85730 THROMBOPLASTIN TIME PARTIAL: CPT | Performed by: NURSE PRACTITIONER

## 2021-01-01 PROCEDURE — 85384 FIBRINOGEN ACTIVITY: CPT | Performed by: INTERNAL MEDICINE

## 2021-01-01 PROCEDURE — 71045 X-RAY EXAM CHEST 1 VIEW: CPT

## 2021-01-01 PROCEDURE — 85027 COMPLETE CBC AUTOMATED: CPT | Performed by: INTERNAL MEDICINE

## 2021-01-01 PROCEDURE — 83880 ASSAY OF NATRIURETIC PEPTIDE: CPT | Performed by: PHYSICIAN ASSISTANT

## 2021-01-01 PROCEDURE — 86901 BLOOD TYPING SEROLOGIC RH(D): CPT | Performed by: PHYSICIAN ASSISTANT

## 2021-01-01 PROCEDURE — 77066 DX MAMMO INCL CAD BI: CPT

## 2021-01-01 PROCEDURE — G1004 CDSM NDSC: HCPCS

## 2021-01-01 PROCEDURE — 86140 C-REACTIVE PROTEIN: CPT

## 2021-01-01 PROCEDURE — 85027 COMPLETE CBC AUTOMATED: CPT | Performed by: EMERGENCY MEDICINE

## 2021-01-01 PROCEDURE — 99213 OFFICE O/P EST LOW 20 MIN: CPT | Performed by: PHYSICIAN ASSISTANT

## 2021-01-01 PROCEDURE — 83036 HEMOGLOBIN GLYCOSYLATED A1C: CPT

## 2021-01-01 PROCEDURE — 70551 MRI BRAIN STEM W/O DYE: CPT

## 2021-01-01 PROCEDURE — 99223 1ST HOSP IP/OBS HIGH 75: CPT | Performed by: PHYSICIAN ASSISTANT

## 2021-01-01 PROCEDURE — 99233 SBSQ HOSP IP/OBS HIGH 50: CPT | Performed by: INTERNAL MEDICINE

## 2021-01-01 RX ORDER — INSULIN GLARGINE 100 [IU]/ML
5 INJECTION, SOLUTION SUBCUTANEOUS
Status: DISCONTINUED | OUTPATIENT
Start: 2021-01-01 | End: 2021-01-01

## 2021-01-01 RX ORDER — ASPIRIN 81 MG/1
81 TABLET ORAL DAILY
Status: DISCONTINUED | OUTPATIENT
Start: 2021-01-01 | End: 2022-01-01

## 2021-01-01 RX ORDER — HEPARIN SODIUM 1000 [USP'U]/ML
3600 INJECTION, SOLUTION INTRAVENOUS; SUBCUTANEOUS
Status: DISCONTINUED | OUTPATIENT
Start: 2021-01-01 | End: 2022-01-01

## 2021-01-01 RX ORDER — GUAIFENESIN/DEXTROMETHORPHAN 100-10MG/5
10 SYRUP ORAL EVERY 4 HOURS PRN
Status: DISCONTINUED | OUTPATIENT
Start: 2021-01-01 | End: 2022-01-01

## 2021-01-01 RX ORDER — SODIUM CHLORIDE 9 MG/ML
50 INJECTION, SOLUTION INTRAVENOUS CONTINUOUS
Status: DISCONTINUED | OUTPATIENT
Start: 2021-01-01 | End: 2021-01-01

## 2021-01-01 RX ORDER — ALPRAZOLAM 0.5 MG/1
0.5 TABLET ORAL 2 TIMES DAILY PRN
Status: DISCONTINUED | OUTPATIENT
Start: 2021-01-01 | End: 2021-01-01

## 2021-01-01 RX ORDER — HYDROMORPHONE HCL/PF 1 MG/ML
0.2 SYRINGE (ML) INJECTION EVERY 4 HOURS PRN
Status: DISCONTINUED | OUTPATIENT
Start: 2021-01-01 | End: 2021-01-01

## 2021-01-01 RX ORDER — FAMOTIDINE 20 MG/1
20 TABLET, FILM COATED ORAL DAILY
Status: DISCONTINUED | OUTPATIENT
Start: 2021-01-01 | End: 2022-01-01

## 2021-01-01 RX ORDER — SODIUM CHLORIDE, SODIUM GLUCONATE, SODIUM ACETATE, POTASSIUM CHLORIDE, MAGNESIUM CHLORIDE, SODIUM PHOSPHATE, DIBASIC, AND POTASSIUM PHOSPHATE .53; .5; .37; .037; .03; .012; .00082 G/100ML; G/100ML; G/100ML; G/100ML; G/100ML; G/100ML; G/100ML
500 INJECTION, SOLUTION INTRAVENOUS ONCE
Status: COMPLETED | OUTPATIENT
Start: 2021-01-01 | End: 2021-01-01

## 2021-01-01 RX ORDER — BENZONATATE 100 MG/1
100 CAPSULE ORAL 3 TIMES DAILY PRN
Status: DISCONTINUED | OUTPATIENT
Start: 2021-01-01 | End: 2022-01-01

## 2021-01-01 RX ORDER — ACETAMINOPHEN 325 MG/1
650 TABLET ORAL EVERY 6 HOURS PRN
Status: DISCONTINUED | OUTPATIENT
Start: 2021-01-01 | End: 2022-01-01

## 2021-01-01 RX ORDER — OXYCODONE HYDROCHLORIDE 5 MG/1
5 TABLET ORAL EVERY 4 HOURS PRN
Status: DISCONTINUED | OUTPATIENT
Start: 2021-01-01 | End: 2021-01-01

## 2021-01-01 RX ORDER — ATORVASTATIN CALCIUM 40 MG/1
40 TABLET, FILM COATED ORAL
Status: DISCONTINUED | OUTPATIENT
Start: 2021-01-01 | End: 2021-01-01

## 2021-01-01 RX ORDER — ATORVASTATIN CALCIUM 20 MG/1
20 TABLET, FILM COATED ORAL
Status: DISCONTINUED | OUTPATIENT
Start: 2021-01-01 | End: 2022-01-01

## 2021-01-01 RX ORDER — HEPARIN SODIUM 1000 [USP'U]/ML
3600 INJECTION, SOLUTION INTRAVENOUS; SUBCUTANEOUS ONCE
Status: COMPLETED | OUTPATIENT
Start: 2021-01-01 | End: 2021-01-01

## 2021-01-01 RX ORDER — LIDOCAINE 50 MG/G
1 PATCH TOPICAL DAILY
Status: DISCONTINUED | OUTPATIENT
Start: 2021-01-01 | End: 2022-01-01

## 2021-01-01 RX ORDER — DEXAMETHASONE SODIUM PHOSPHATE 4 MG/ML
6 INJECTION, SOLUTION INTRA-ARTICULAR; INTRALESIONAL; INTRAMUSCULAR; INTRAVENOUS; SOFT TISSUE EVERY 24 HOURS
Status: DISCONTINUED | OUTPATIENT
Start: 2021-01-01 | End: 2021-01-01

## 2021-01-01 RX ORDER — INSULIN GLARGINE 100 [IU]/ML
20 INJECTION, SOLUTION SUBCUTANEOUS
Status: DISCONTINUED | OUTPATIENT
Start: 2021-01-01 | End: 2022-01-01

## 2021-01-01 RX ORDER — INSULIN GLARGINE 100 [IU]/ML
10 INJECTION, SOLUTION SUBCUTANEOUS
Status: DISCONTINUED | OUTPATIENT
Start: 2021-01-01 | End: 2021-01-01

## 2021-01-01 RX ORDER — ONDANSETRON 2 MG/ML
4 INJECTION INTRAMUSCULAR; INTRAVENOUS EVERY 6 HOURS PRN
Status: DISCONTINUED | OUTPATIENT
Start: 2021-01-01 | End: 2022-01-01

## 2021-01-01 RX ORDER — DOXYCYCLINE HYCLATE 100 MG/1
100 CAPSULE ORAL EVERY 12 HOURS
Status: DISCONTINUED | OUTPATIENT
Start: 2021-01-01 | End: 2022-01-01

## 2021-01-01 RX ORDER — HEPARIN SODIUM 1000 [USP'U]/ML
1800 INJECTION, SOLUTION INTRAVENOUS; SUBCUTANEOUS
Status: DISCONTINUED | OUTPATIENT
Start: 2021-01-01 | End: 2022-01-01

## 2021-01-01 RX ORDER — SODIUM CHLORIDE 30 MG/ML INHALATION SOLUTION 30 MG/ML
4 SOLUTION INHALANT
Status: DISCONTINUED | OUTPATIENT
Start: 2021-01-01 | End: 2022-01-01

## 2021-01-01 RX ORDER — ATORVASTATIN CALCIUM 20 MG/1
20 TABLET, FILM COATED ORAL
Status: DISCONTINUED | OUTPATIENT
Start: 2021-01-01 | End: 2021-01-01

## 2021-01-01 RX ORDER — CALCIUM CARBONATE 200(500)MG
500 TABLET,CHEWABLE ORAL 2 TIMES DAILY PRN
Status: DISCONTINUED | OUTPATIENT
Start: 2021-01-01 | End: 2022-01-01

## 2021-01-01 RX ORDER — LANOLIN ALCOHOL/MO/W.PET/CERES
6 CREAM (GRAM) TOPICAL
Status: DISCONTINUED | OUTPATIENT
Start: 2021-01-01 | End: 2022-01-01

## 2021-01-01 RX ORDER — HEPARIN SODIUM 10000 [USP'U]/100ML
3-20 INJECTION, SOLUTION INTRAVENOUS
Status: DISCONTINUED | OUTPATIENT
Start: 2021-01-01 | End: 2022-01-01

## 2021-01-01 RX ORDER — DEXAMETHASONE SODIUM PHOSPHATE 4 MG/ML
0.1 INJECTION, SOLUTION INTRA-ARTICULAR; INTRALESIONAL; INTRAMUSCULAR; INTRAVENOUS; SOFT TISSUE EVERY 12 HOURS
Status: COMPLETED | OUTPATIENT
Start: 2021-01-01 | End: 2022-01-01

## 2021-01-01 RX ADMIN — LIDOCAINE 1 PATCH: 50 PATCH CUTANEOUS at 09:11

## 2021-01-01 RX ADMIN — SODIUM CHLORIDE SOLN NEBU 3% 4 ML: 3 NEBU SOLN at 19:23

## 2021-01-01 RX ADMIN — MELATONIN TAB 3 MG 6 MG: 3 TAB at 22:55

## 2021-01-01 RX ADMIN — DOXYCYCLINE 100 MG: 100 CAPSULE ORAL at 09:09

## 2021-01-01 RX ADMIN — HEPARIN SODIUM 1800 UNITS: 1000 INJECTION INTRAVENOUS; SUBCUTANEOUS at 20:35

## 2021-01-01 RX ADMIN — INSULIN GLARGINE 10 UNITS: 100 INJECTION, SOLUTION SUBCUTANEOUS at 21:14

## 2021-01-01 RX ADMIN — MELATONIN TAB 3 MG 6 MG: 3 TAB at 21:29

## 2021-01-01 RX ADMIN — DEXAMETHASONE SODIUM PHOSPHATE 6.16 MG: 4 INJECTION INTRA-ARTICULAR; INTRALESIONAL; INTRAMUSCULAR; INTRAVENOUS; SOFT TISSUE at 21:08

## 2021-01-01 RX ADMIN — FAMOTIDINE 20 MG: 20 TABLET ORAL at 09:11

## 2021-01-01 RX ADMIN — HEPARIN SODIUM 3600 UNITS: 1000 INJECTION INTRAVENOUS; SUBCUTANEOUS at 10:34

## 2021-01-01 RX ADMIN — INSULIN LISPRO 2 UNITS: 100 INJECTION, SOLUTION INTRAVENOUS; SUBCUTANEOUS at 22:59

## 2021-01-01 RX ADMIN — NYSTATIN 500000 UNITS: 100000 SUSPENSION ORAL at 21:29

## 2021-01-01 RX ADMIN — SODIUM CHLORIDE 1000 ML: 0.9 INJECTION, SOLUTION INTRAVENOUS at 18:16

## 2021-01-01 RX ADMIN — DEXAMETHASONE SODIUM PHOSPHATE 6.16 MG: 4 INJECTION INTRA-ARTICULAR; INTRALESIONAL; INTRAMUSCULAR; INTRAVENOUS; SOFT TISSUE at 09:11

## 2021-01-01 RX ADMIN — DOXYCYCLINE 100 MG: 100 CAPSULE ORAL at 09:11

## 2021-01-01 RX ADMIN — REMDESIVIR 200 MG: 100 INJECTION, POWDER, LYOPHILIZED, FOR SOLUTION INTRAVENOUS at 22:54

## 2021-01-01 RX ADMIN — MELATONIN TAB 3 MG 6 MG: 3 TAB at 21:22

## 2021-01-01 RX ADMIN — INSULIN LISPRO 1 UNITS: 100 INJECTION, SOLUTION INTRAVENOUS; SUBCUTANEOUS at 09:27

## 2021-01-01 RX ADMIN — INSULIN LISPRO 2 UNITS: 100 INJECTION, SOLUTION INTRAVENOUS; SUBCUTANEOUS at 16:17

## 2021-01-01 RX ADMIN — CEFTRIAXONE SODIUM 1000 MG: 10 INJECTION, POWDER, FOR SOLUTION INTRAVENOUS at 17:41

## 2021-01-01 RX ADMIN — DOXYCYCLINE 100 MG: 100 CAPSULE ORAL at 21:14

## 2021-01-01 RX ADMIN — ATORVASTATIN CALCIUM 40 MG: 40 TABLET, FILM COATED ORAL at 16:16

## 2021-01-01 RX ADMIN — INSULIN LISPRO 2 UNITS: 100 INJECTION, SOLUTION INTRAVENOUS; SUBCUTANEOUS at 08:38

## 2021-01-01 RX ADMIN — MELATONIN TAB 3 MG 6 MG: 3 TAB at 21:14

## 2021-01-01 RX ADMIN — FAMOTIDINE 20 MG: 20 TABLET ORAL at 09:26

## 2021-01-01 RX ADMIN — DOXYCYCLINE 100 MG: 100 CAPSULE ORAL at 21:30

## 2021-01-01 RX ADMIN — REMDESIVIR 100 MG: 100 INJECTION, POWDER, LYOPHILIZED, FOR SOLUTION INTRAVENOUS at 20:00

## 2021-01-01 RX ADMIN — IOHEXOL 75 ML: 350 INJECTION, SOLUTION INTRAVENOUS at 13:03

## 2021-01-01 RX ADMIN — ASPIRIN 81 MG: 81 TABLET, COATED ORAL at 08:31

## 2021-01-01 RX ADMIN — BENZONATATE 100 MG: 100 CAPSULE ORAL at 05:23

## 2021-01-01 RX ADMIN — DEXAMETHASONE SODIUM PHOSPHATE 6.16 MG: 4 INJECTION INTRA-ARTICULAR; INTRALESIONAL; INTRAMUSCULAR; INTRAVENOUS; SOFT TISSUE at 10:35

## 2021-01-01 RX ADMIN — SODIUM CHLORIDE 50 ML/HR: 0.9 INJECTION, SOLUTION INTRAVENOUS at 22:54

## 2021-01-01 RX ADMIN — CEFTRIAXONE SODIUM 1000 MG: 10 INJECTION, POWDER, FOR SOLUTION INTRAVENOUS at 18:32

## 2021-01-01 RX ADMIN — HEPARIN SODIUM 12 UNITS/KG/HR: 10000 INJECTION, SOLUTION INTRAVENOUS at 10:37

## 2021-01-01 RX ADMIN — BARICITINIB 2 MG: 2 TABLET, FILM COATED ORAL at 12:42

## 2021-01-01 RX ADMIN — INSULIN GLARGINE 20 UNITS: 100 INJECTION, SOLUTION SUBCUTANEOUS at 21:13

## 2021-01-01 RX ADMIN — ASPIRIN 81 MG: 81 TABLET, COATED ORAL at 09:27

## 2021-01-01 RX ADMIN — SODIUM CHLORIDE, SODIUM GLUCONATE, SODIUM ACETATE, POTASSIUM CHLORIDE, MAGNESIUM CHLORIDE, SODIUM PHOSPHATE, DIBASIC, AND POTASSIUM PHOSPHATE 500 ML: .53; .5; .37; .037; .03; .012; .00082 INJECTION, SOLUTION INTRAVENOUS at 19:04

## 2021-01-01 RX ADMIN — DEXAMETHASONE SODIUM PHOSPHATE 6.16 MG: 4 INJECTION INTRA-ARTICULAR; INTRALESIONAL; INTRAMUSCULAR; INTRAVENOUS; SOFT TISSUE at 21:22

## 2021-01-01 RX ADMIN — HEPARIN SODIUM 1800 UNITS: 1000 INJECTION INTRAVENOUS; SUBCUTANEOUS at 12:05

## 2021-01-01 RX ADMIN — DEXAMETHASONE SODIUM PHOSPHATE 6.16 MG: 4 INJECTION INTRA-ARTICULAR; INTRALESIONAL; INTRAMUSCULAR; INTRAVENOUS; SOFT TISSUE at 08:54

## 2021-01-01 RX ADMIN — DOXYCYCLINE 100 MG: 100 CAPSULE ORAL at 21:22

## 2021-01-01 RX ADMIN — CEFTRIAXONE SODIUM 1000 MG: 10 INJECTION, POWDER, FOR SOLUTION INTRAVENOUS at 17:12

## 2021-01-01 RX ADMIN — BARICITINIB 4 MG: 2 TABLET, FILM COATED ORAL at 08:54

## 2021-01-01 RX ADMIN — REMDESIVIR 100 MG: 100 INJECTION, POWDER, LYOPHILIZED, FOR SOLUTION INTRAVENOUS at 21:22

## 2021-01-01 RX ADMIN — DOXYCYCLINE 100 MG: 100 CAPSULE ORAL at 10:35

## 2021-01-01 RX ADMIN — ATORVASTATIN CALCIUM 20 MG: 20 TABLET, FILM COATED ORAL at 16:26

## 2021-01-01 RX ADMIN — BARICITINIB 2 MG: 2 TABLET, FILM COATED ORAL at 09:10

## 2021-01-01 RX ADMIN — DEXAMETHASONE SODIUM PHOSPHATE 6.16 MG: 4 INJECTION INTRA-ARTICULAR; INTRALESIONAL; INTRAMUSCULAR; INTRAVENOUS; SOFT TISSUE at 21:15

## 2021-01-01 RX ADMIN — BARICITINIB 2 MG: 2 TABLET, FILM COATED ORAL at 10:35

## 2021-01-01 RX ADMIN — FAMOTIDINE 20 MG: 20 TABLET ORAL at 08:31

## 2021-01-01 RX ADMIN — CEFTRIAXONE SODIUM 1000 MG: 10 INJECTION, POWDER, FOR SOLUTION INTRAVENOUS at 18:17

## 2021-01-01 RX ADMIN — INSULIN LISPRO 3 UNITS: 100 INJECTION, SOLUTION INTRAVENOUS; SUBCUTANEOUS at 21:30

## 2021-01-01 RX ADMIN — HEPARIN SODIUM 1800 UNITS: 1000 INJECTION INTRAVENOUS; SUBCUTANEOUS at 16:21

## 2021-01-01 RX ADMIN — INSULIN LISPRO 2 UNITS: 100 INJECTION, SOLUTION INTRAVENOUS; SUBCUTANEOUS at 12:47

## 2021-01-01 RX ADMIN — ATORVASTATIN CALCIUM 20 MG: 20 TABLET, FILM COATED ORAL at 16:19

## 2021-01-01 RX ADMIN — DEXAMETHASONE SODIUM PHOSPHATE 6 MG: 4 INJECTION INTRA-ARTICULAR; INTRALESIONAL; INTRAMUSCULAR; INTRAVENOUS; SOFT TISSUE at 21:30

## 2021-01-01 RX ADMIN — ACETAMINOPHEN 650 MG: 325 TABLET, FILM COATED ORAL at 21:30

## 2021-01-01 RX ADMIN — ASPIRIN 81 MG: 81 TABLET, COATED ORAL at 09:10

## 2021-05-26 PROBLEM — I73.9 PERIPHERAL VASCULAR DISEASE, UNSPECIFIED (HCC): Status: ACTIVE | Noted: 2021-01-01

## 2021-12-28 PROBLEM — A41.9 SEPSIS (HCC): Status: ACTIVE | Noted: 2021-01-01

## 2021-12-28 PROBLEM — J96.01 ACUTE RESPIRATORY FAILURE WITH HYPOXIA (HCC): Status: ACTIVE | Noted: 2021-01-01

## 2021-12-28 PROBLEM — U07.1 COVID-19: Status: ACTIVE | Noted: 2021-01-01

## 2021-12-28 NOTE — ED PROVIDER NOTES
History  Chief Complaint   Patient presents with    Shortness of Breath     pt c/o SOB  pt was at urgent care and is Covid+ for the past x10 days  68 yo F with HTN, DM2, sent to ED by ambulance from [facility] for further evaluation and treatment of respiratory distress  She tested COVID POSITIVE on 12/20/21, with increasing shortness of breath, found to be by hypoxic, documented "24%", 90% on 15L NRB  I observed her to drop to 77% in the ED on RA before I switched her to high flow, she is alert, ill appearing, keeps showing me her tongue which is very dry and crusted      History provided by:  Patient  History limited by:  Severe respiratory distress   used: unable to use due to respiratory distress  Prior to Admission Medications   Prescriptions Last Dose Informant Patient Reported? Taking?    Aspirin Low Dose 81 MG EC tablet   No No   Sig: TAKE 1 TABLET BY MOUTH EVERY DAY   Blood Pressure Monitor KIT   No No   Sig: Use daily   Empagliflozin 25 MG TABS   No No   Sig: Take 1 tablet (25 mg total) by mouth every morning   Janumet  MG per tablet   No No   Sig: TAKE 1 TABLET BY MOUTH TWICE A DAY WITH MEALS   acetaminophen (TYLENOL) 500 mg tablet   No No   Sig: Take 1 tablet (500 mg total) by mouth every 6 (six) hours as needed for mild pain   atorvastatin (LIPITOR) 20 mg tablet   No No   Sig: TAKE 1 TABLET BY MOUTH EVERY DAY   chlorthalidone 25 mg tablet   No No   Sig: TAKE 1 TABLET BY MOUTH EVERY DAY   ibuprofen (MOTRIN) 400 mg tablet   No No   Sig: TAKE 1 TABLET (400 MG TOTAL) BY MOUTH EVERY 6 (SIX) HOURS AS NEEDED FOR MILD PAIN   loratadine (CLARITIN) 10 mg tablet   No No   Sig: TAKE 1 TABLET BY MOUTH EVERY DAY   losartan (COZAAR) 100 MG tablet   No No   Sig: TAKE 1 TABLET BY MOUTH EVERY DAY   meclizine (ANTIVERT) 25 mg tablet   No No   Sig: Take 1 tablet (25 mg total) by mouth every 8 (eight) hours   ondansetron (Zofran ODT) 4 mg disintegrating tablet   No No   Sig: Take 1 tablet (4 mg total) by mouth every 6 (six) hours as needed for nausea or vomiting   potassium chloride (Klor-Con) 10 mEq tablet   No No   Sig: TAKE 1 TABLET (10 MEQ TOTAL) BY MOUTH 2 (TWO) TIMES A DAY   triamcinolone (KENALOG) 0 5 % cream   No No   Sig: APPLY TO AFFECTED AREA 3 TIMES A DAY      Facility-Administered Medications: None       History reviewed  No pertinent past medical history  History reviewed  No pertinent surgical history  Family History   Problem Relation Age of Onset    No Known Problems Mother     No Known Problems Father     No Known Problems Sister     No Known Problems Daughter     No Known Problems Maternal Grandmother     No Known Problems Maternal Grandfather     No Known Problems Paternal Grandmother     No Known Problems Paternal Grandfather     No Known Problems Sister     No Known Problems Sister     No Known Problems Daughter     No Known Problems Daughter      I have reviewed and agree with the history as documented  E-Cigarette/Vaping    E-Cigarette Use Never User      E-Cigarette/Vaping Substances     Social History     Tobacco Use    Smoking status: Never Smoker    Smokeless tobacco: Never Used   Vaping Use    Vaping Use: Never used   Substance Use Topics    Alcohol use: Never    Drug use: Never       Review of Systems   Unable to perform ROS: Severe respiratory distress       Physical Exam  Physical Exam  Vitals and nursing note reviewed  Constitutional:       General: She is not in acute distress  Appearance: She is well-developed  She is not diaphoretic  HENT:      Head: Normocephalic and atraumatic  Right Ear: External ear normal       Left Ear: External ear normal       Nose: Nose normal       Mouth/Throat:      Palate: Lesions (exudative, ulcerated, tongue and palate) present  Eyes:      Conjunctiva/sclera: Conjunctivae normal       Pupils: Pupils are equal, round, and reactive to light     Cardiovascular:      Rate and Rhythm: Normal rate and regular rhythm  Pulmonary:      Effort: Tachypnea and respiratory distress present  No accessory muscle usage or prolonged expiration  Breath sounds: No decreased air movement  Abdominal:      General: There is no distension  Palpations: Abdomen is soft  Tenderness: There is no abdominal tenderness  Musculoskeletal:         General: Normal range of motion  Cervical back: Normal range of motion and neck supple  Skin:     General: Skin is warm and dry  Capillary Refill: Capillary refill takes 2 to 3 seconds  Coloration: Skin is pale  Findings: No rash  Neurological:      Mental Status: She is alert and oriented to person, place, and time  Gait: Gait normal    Psychiatric:         Mood and Affect: Mood is anxious           Vital Signs  ED Triage Vitals [12/28/21 1718]   Temperature Pulse Respirations Blood Pressure SpO2   98 4 °F (36 9 °C) 90 (!) 28 143/66 92 %      Temp Source Heart Rate Source Patient Position - Orthostatic VS BP Location FiO2 (%)   Oral Monitor Lying Right arm --      Pain Score       No Pain           Vitals:    12/28/21 1821 12/28/21 1956 12/28/21 2000 12/28/21 2222   BP: 110/59 106/52 106/52 112/61   Pulse: 84 86 86 87   Patient Position - Orthostatic VS: Lying Lying           Visual Acuity      ED Medications  Medications   atorvastatin (LIPITOR) tablet 20 mg (has no administration in time range)   aspirin (ECOTRIN LOW STRENGTH) EC tablet 81 mg (has no administration in time range)   ondansetron (ZOFRAN) injection 4 mg (has no administration in time range)   acetaminophen (TYLENOL) tablet 650 mg (650 mg Oral Given 12/28/21 2130)   insulin lispro (HumaLOG) 100 units/mL subcutaneous injection 1-5 Units (2 Units Subcutaneous Given 12/28/21 2259)   remdesivir (Veklury) 200 mg in sodium chloride 0 9 % 290 mL IVPB (200 mg Intravenous Given 12/28/21 2254)     Followed by   remdesivir [place]) 100 mg in sodium chloride 0 9 % 270 mL IVPB (has no administration in time range)   dexamethasone (DECADRON) injection 6 mg (6 mg Intravenous Given 12/28/21 2130)   ALPRAZolam (XANAX) tablet 0 5 mg (has no administration in time range)   oxyCODONE (ROXICODONE) IR tablet 5 mg (has no administration in time range)   HYDROmorphone (DILAUDID) injection 0 2 mg (has no administration in time range)   famotidine (PEPCID) tablet 20 mg (has no administration in time range)   calcium carbonate (TUMS) chewable tablet 500 mg (has no administration in time range)   lidocaine (LIDODERM) 5 % patch 1 patch (has no administration in time range)   melatonin tablet 6 mg (6 mg Oral Given 12/28/21 2255)   sodium chloride 0 9 % infusion (50 mL/hr Intravenous New Bag 12/28/21 2254)   ceftriaxone (ROCEPHIN) 1 g/50 mL in dextrose IVPB (0 mg Intravenous Stopped 12/28/21 1955)   sodium chloride 0 9 % bolus 1,000 mL (0 mL Intravenous Stopped 12/28/21 1955)   nystatin (MYCOSTATIN) oral suspension 500,000 Units (500,000 Units Swish & Swallow Given 12/28/21 2129)       Diagnostic Studies  Results Reviewed     Procedure Component Value Units Date/Time    Lactic acid [[id number]]  (Abnormal) Collected: 12/28/21 2120    Lab Status: Final result Specimen: Blood from Arm, Left Updated: 12/28/21 2218     LACTIC ACID 3 3 mmol/L     Narrative:      Result may be elevated if tourniquet was used during collection      HS Troponin 0hr (reflex protocol) [[id number]]  (Abnormal) Collected: 12/28/21 2120    Lab Status: Final result Specimen: Blood from Arm, Left Updated: 12/28/21 2218     hs TnI 0hr 57 ng/L     D-Dimer [[id number]]  (Abnormal) Collected: 12/28/21 2120    Lab Status: Final result Specimen: Blood from Arm, Left Updated: 12/28/21 2209     D-Dimer, Quant 5 15 ug/ml U     Comprehensive metabolic panel [[id number]]  (Abnormal) Collected: 12/28/21 2120    Lab Status: Final result Specimen: Blood from Arm, Left Updated: 12/28/21 2154     Sodium 130 mmol/L      Potassium 4 0 mmol/L      Chloride 89 mmol/L CO2 24 mmol/L      ANION GAP 17 mmol/L      BUN 33 mg/dL      Creatinine 1 32 mg/dL      Glucose 216 mg/dL      Calcium 7 6 mg/dL      Corrected Calcium 9 2 mg/dL      AST 28 U/L      ALT 26 U/L      Alkaline Phosphatase 146 U/L      Total Protein 7 3 g/dL      Albumin 2 0 g/dL      Total Bilirubin 0 71 mg/dL      eGFR 38 ml/min/1 73sq m     Narrative:      [place] guidelines for Chronic Kidney Disease (CKD):     Stage 1 with normal or high GFR (GFR > 90 mL/min/1 73 square meters)    Stage 2 Mild CKD (GFR = 60-89 mL/min/1 73 square meters)    Stage 3A Moderate CKD (GFR = 45-59 mL/min/1 73 square meters)    Stage 3B Moderate CKD (GFR = 30-44 mL/min/1 73 square meters)    Stage 4 Severe CKD (GFR = 15-29 mL/min/1 73 square meters)    Stage 5 End Stage CKD (GFR <15 mL/min/1 73 square meters)  Note: GFR calculation is accurate only with a steady state creatinine    C-reactive protein [[id number]] Collected: 12/28/21 2120    Lab Status:  In process Specimen: Blood from Arm, Left Updated: 12/28/21 2132    NT-BNP PRO [[id number]]     Lab Status: No result Specimen: Blood     CK (with reflex to MB) [[id number]]     Lab Status: No result Specimen: Blood     Manual Differential(PHLEBS Do Not Order) [[id number]]  (Abnormal) Collected: 12/28/21 1807    Lab Status: Final result Specimen: Blood from Arm, Left Updated: 12/28/21 1935     Segmented % 94 %      Bands % 2 %      Lymphocytes % 3 %      Monocytes % 1 %      Eosinophils, % 0 %      Basophils % 0 %      Absolute Neutrophils 20 57 Thousand/uL      Lymphocytes Absolute 0 64 Thousand/uL      Monocytes Absolute 0 21 Thousand/uL      Eosinophils Absolute 0 00 Thousand/uL      Basophils Absolute 0 00 Thousand/uL      Total Counted --     nRBC 2 /100 WBC      RBC Morphology Normal     Polychromasia Present     Platelet Estimate Adequate    CBC and differential [[id number]]  (Abnormal) Collected: 12/28/21 1807    Lab Status: Final result Specimen: Blood from Arm, Left Updated: 12/28/21 1851     WBC 21 43 Thousand/uL      RBC 5 28 Million/uL      Hemoglobin 15 3 g/dL      Hematocrit 44 0 %      MCV 83 fL      MCH 29 0 pg      MCHC 34 8 g/dL      RDW 14 6 %      MPV 9 7 fL      Platelets 334 Thousands/uL     Narrative: This is an appended report  These results have been appended to a previously verified report  Procalcitonin with AM Reflex [[id number]] Collected: 12/28/21 1807    Lab Status: In process Specimen: Blood from Arm, Left Updated: 12/28/21 1819    Blood culture #2 [[id number]] Collected: 12/28/21 1807    Lab Status: In process Specimen: Blood from Arm, Left Updated: 12/28/21 1818    Blood culture #1 [[id number]] Collected: 12/28/21 1806    Lab Status:  In process Specimen: Blood from Arm, Left Updated: 12/28/21 1818                 XR chest 1 view portable   ED Interpretation by Andie Joyce MD (12/28 1843)   COVID                 Procedures  ECG 12 Lead Documentation Only    Date/Time: 12/28/2021 5:53 PM  Performed by: Andie Joyce MD  Authorized by: Andie Joyce MD     Rate:     ECG rate:  87  Rhythm:     Rhythm: sinus rhythm    ST segments:     ST segments:  Non-specific    CriticalCare Time  Performed by: Andie Joyce MD  Authorized by: Andie Joyce MD     Critical care provider statement:     Critical care time (minutes):  30    Critical care time was exclusive of:  Separately billable procedures and treating other patients and teaching time    Critical care was necessary to treat or prevent imminent or life-threatening deterioration of the following conditions:  Respiratory failure    Critical care was time spent personally by me on the following activities:  Blood draw for specimens, obtaining history from patient or surrogate, development of treatment plan with patient or surrogate, discussions with consultants, evaluation of patient's response to treatment, examination of patient, interpretation of cardiac output measurements, ordering and performing treatments and interventions, ordering and review of laboratory studies, ordering and review of radiographic studies, re-evaluation of patient's condition and review of old charts    I assumed direction of critical care for this patient from another provider in my specialty: no               ED Course  ED Course as of 12/28/21 2309   Tue Dec 28, 2021   1843 XR chest 1 view portable  COVID   1852 D/W Her daughter Frank Salinas, who helped me with history   2018 Patient is able to give more history using [employer] , biggest complaint is sore throat, likely thrush  MDM    Disposition  Final diagnoses:   Pneumonia due to COVID-19 virus   Oral candidiasis     Time reflects when diagnosis was documented in both MDM as applicable and the Disposition within this note     Time User Action Codes Description Comment    12/28/2021  7:43 PM Karen WOODS Add [U07 1,  J12 82] Pneumonia due to COVID-19 virus     12/28/2021  8:13 PM Elvis White Add [J96 01] Acute respiratory failure with hypoxia ([facility] [address] )     12/28/2021  8:22 PM Danilo Farah Add [B37 0] Oral candidiasis       ED Disposition     ED Disposition Condition Date/Time Comment    Admit Good Tue Dec 28, 2021  8:02 PM Case was discussed with Dr Taylor Justice and the patient's admission status was agreed to be Admission Status: inpatient status to the service of Dr Taylor Justice           Follow-up Information    None         Current Discharge Medication List      CONTINUE these medications which have NOT CHANGED    Details   acetaminophen (TYLENOL) 500 mg tablet Take 1 tablet (500 mg total) by mouth every 6 (six) hours as needed for mild pain  Qty: 50 tablet, Refills: 3    Associated Diagnoses: Daily headache      Aspirin Low Dose 81 MG EC tablet TAKE 1 TABLET BY MOUTH EVERY DAY  Qty: 90 tablet, Refills: 5    Associated Diagnoses: Type 2 diabetes mellitus with other specified complication, without long-term current use of insulin ([facility])      atorvastatin (LIPITOR) 20 mg tablet TAKE 1 TABLET BY MOUTH EVERY DAY  Qty: 90 tablet, Refills: 1    Associated Diagnoses: Mixed hyperlipidemia      Blood Pressure Monitor KIT Use daily  Qty: 1 kit, Refills: 0    Associated Diagnoses: Essential hypertension      chlorthalidone 25 mg tablet TAKE 1 TABLET BY MOUTH EVERY DAY  Qty: 90 tablet, Refills: 3    Associated Diagnoses: Essential hypertension      Empagliflozin 25 MG TABS Take 1 tablet (25 mg total) by mouth every morning  Qty: 90 tablet, Refills: 3    Associated Diagnoses: Type 2 diabetes mellitus with other specified complication, without long-term current use of insulin ([facility])      ibuprofen (MOTRIN) 400 mg tablet TAKE 1 TABLET (400 MG TOTAL) BY MOUTH EVERY 6 (SIX) HOURS AS NEEDED FOR MILD PAIN  Qty: 50 tablet, Refills: 3    Associated Diagnoses: Daily headache      Janumet  MG per tablet TAKE 1 TABLET BY MOUTH TWICE A DAY WITH MEALS  Qty: 180 tablet, Refills: 1    Associated Diagnoses: Type 2 diabetes mellitus with other specified complication, without long-term current use of insulin ([facility])      loratadine (CLARITIN) 10 mg tablet TAKE 1 TABLET BY MOUTH EVERY DAY  Qty: 90 tablet, Refills: 3    Associated Diagnoses:  Allergic rhinitis, unspecified seasonality, unspecified trigger      losartan (COZAAR) 100 MG tablet TAKE 1 TABLET BY MOUTH EVERY DAY  Qty: 90 tablet, Refills: 1    Associated Diagnoses: Essential hypertension      meclizine (ANTIVERT) 25 mg tablet Take 1 tablet (25 mg total) by mouth every 8 (eight) hours  Qty: 30 tablet, Refills: 0    Associated Diagnoses: Dizziness      ondansetron (Zofran ODT) 4 mg disintegrating tablet Take 1 tablet (4 mg total) by mouth every 6 (six) hours as needed for nausea or vomiting  Qty: 20 tablet, Refills: 0    Associated Diagnoses: Nausea      potassium chloride (Klor-Con) 10 mEq tablet TAKE 1 TABLET (10 MEQ TOTAL) BY MOUTH 2 (TWO) TIMES A DAY  Qty: 180 tablet, Refills: 1    Associated Diagnoses: Hypokalemia      triamcinolone (KENALOG) 0 5 % cream APPLY TO AFFECTED AREA 3 TIMES A DAY  Qty: 60 g, Refills: 0    Associated Diagnoses: Rash             No discharge procedures on file      PDMP Review     None          ED Provider  Electronically Signed by           Shonna Mayen MD  12/28/21 6188

## 2021-12-29 PROBLEM — J12.82 PNEUMONIA DUE TO COVID-19 VIRUS: Status: ACTIVE | Noted: 2021-01-01

## 2021-12-29 NOTE — QUICK NOTE
Reviewed case with critical care team, Leann BEEBE:  Pt will be transferred to ICU  D/w supervisor    Bed assignment in progress    Called daughter & granddaughter and gave additional update on transfer to icu

## 2021-12-29 NOTE — QUICK NOTE
Notified by RN that pts sats dropping to low 70% on 15L midflow and NRB  D/w Pulmonary/Critical Care team:  Recommend titrating oxygen to keep sats >88% and if requirement is >70% would need critical care     Will start HFO2 as needed    Cont tx protocol for severe disease

## 2021-12-29 NOTE — UTILIZATION REVIEW
Initial Clinical Review    Admission: Date/Time/Statement:   Admission Orders (From admission, onward)     Ordered        12/28/21 2003  Inpatient Admission  Once                      Orders Placed This Encounter   Procedures    Inpatient Admission     Standing Status:   Standing     Number of Occurrences:   1     Order Specific Question:   Level of Care     Answer:   Med Surg [16]     Order Specific Question:   Estimated length of stay     Answer:   More than 2 Midnights     Order Specific Question:   Certification     Answer:   I certify that inpatient services are medically necessary for this patient for a duration of greater than two midnights  See H&P and MD Progress Notes for additional information about the patient's course of treatment  ED Arrival Information     Expected Arrival Acuity    12/28/2021 12/28/2021 17:16 Emergent         Means of arrival Escorted by Service Admission type    Ambulance [facility] Emergency         Arrival complaint    Shortness of breath        Chief Complaint   Patient presents with    Shortness of Breath     pt c/o SOB  pt was at urgent care and is Covid+ for the past x10 days  Initial Presentation:   Mrs Fox Seen is a 67 yof who presents to the ED via EMS from Urgent Care with c/o SOB, cough, decreased oral intake, abd pain, intermittent dysphagiaw/ globus sensation to solid foods x 10 days  Was found to be covid + 12/20  Unvaccinated  PMH: htn, niddm  In the ED she tested positive for Covid 19   + troponins, Elevated lactic acid, leukocytosis, D dimer, Procalcitonin, BNP, CRP all elevated  She is on midflow NC at 15 L/MIn and then NRB mask and was still hypoxic in the 80s  On exam she is ill appearing, has rales in chest, BLE edema  She is admitted to INPATIENT status to [facility] 2 SD with Acute hypoxic respiratory failure - 15 L MFNC to keep sats > 90%  Covid 19 - IV Dexamethasone, IV Remdesivir, trend inflammatory markers, baricitinib  Sepsis - IV antibioitics, follow cultures  HTN - PRN Hydralazine, holding Chlorthalidone/Losartan  NIDDM - SSI cover  Dysphagia - ST consult, dysphagia 2 diet with thin liquids  Date: 12/29   Day 2:   Despite oxygen on [address] @ 15L  And NRB mask and she remains hypoxic  She is afebrile  No c/o pain, just SOB and cough  Very dry mouth  She is tachypneic  Decreased breath sounds bilat  She has been escalated to severe pathway  She may require transfer to ICU  Consult Pulmonary  Has elevated blood sugars  Pt was transferred to critical care d/t declining oxygen sats  She is maximized on HFNC at 100% and continues to have hypoxic sats and hypotension  Continues on heparin drip and IV fluids  12/29 Pulmonary Consult - Acute hypoxic resp failure d/t Covid - high flow NC on 100% NRB 89% pulse ox  Not on home oxygen  Will need home oxygen at home at d/c  Continue pulmonary toilet  Continue on current covid specific treatment and high dose steroids  Continue IV antibiotics, is having quick decompensation  D dimer up to 10 5       12/29 Palliative Care Consult - req for goals of care discussion which took place with family on phone with family translating  After discussion of possible scenarios the family wished to continue all mgmt "to save her" including resuscitation and chest compression        ED Triage Vitals   Temperature Pulse Respirations Blood Pressure SpO2   12/28/21 1718 12/28/21 1718 12/28/21 1718 12/28/21 1718 12/28/21 1718   98 4 °F (36 9 °C) 90 (!) 28 143/66 92 %      Temp Source Heart Rate Source Patient Position - Orthostatic VS BP Location FiO2 (%)   12/28/21 1718 12/28/21 1718 12/28/21 1718 12/28/21 1718 12/29/21 1403   Oral Monitor Lying Right arm 100      Pain Score       12/28/21 1718       No Pain          Wt Readings from Last 1 Encounters:   12/29/21 60 6 kg (133 lb 9 6 oz)     Additional Vital Signs:   12/29/21 2000 -- 72 21 101/50 68 87 % Abnormal  -- -- High flow nasal cannula  -- Lying   12/29/21 1943 96 5 °F (35 8 °C) Abnormal  -- -- -- -- -- -- -- -- -- --   12/29/21 1926 -- -- -- -- -- 90 % 100 60 L/min High flow nasal cannula -- --   12/29/21 1859 -- 74 17 75/37 Abnormal  48 88 % Abnormal  -- -- -- -- --   12/29/21 1856 -- 74 17 -- -- 90 % -- -- -- -- --   12/29/21 1759 -- 90 27 Abnormal  102/46 Abnormal  62 79 % Abnormal  -- -- -- -- Lying   12/29/21 1705 -- 76 22 85/52 Abnormal  63 92 % -- -- -- -- --   12/29/21 1614 97 1 °F (36 2 °C) Abnormal  76 22 -- -- 92 % -- -- -- -- --   12/29/21 1559 -- 80 23 Abnormal  86/48 Abnormal  63 93 % 100 60 L/min High flow nasal cannula  -- Lying   12/29/21 1517 -- 74 18 81/46 Abnormal  59 90 % 100 60 L/min High flow nasal cannula  -- Lying   12/29/21 1516 -- -- -- -- -- 90 % -- -- -- HFNC prongs --   12/29/21 1459 -- 76 18 79/41 Abnormal  46 90 % -- -- -- -- --   12/29/21 1403 98 1 °F (36 7 °C) 84 25 Abnormal  106/54 78 84 % Abnormal  100 60 L/min High flow nasal cannula  -- Lying   12/29/21 1219 -- -- -- -- -- -- -- -- -- HFNC prongs --   12/29/21 10:54:59 98 °F (36 7 °C) 94 18 112/53 73 87 % Abnormal  -- -- -- -- --     12/29/21 10:54:59 98 °F (36 7 °C) 94 18 112/53 73 87 % Abnormal  -- -- --   12/29/21 08:08:54 97 9 °F (36 6 °C) -- 22 114/54 74 -- -- -- --   12/29/21 04:23:11 97 9 °F (36 6 °C) 87 -- 119/72 88 85 % Abnormal  -- -- --   12/28/21 2352 97 8 °F (36 6 °C) 87 20 112/61 -- -- -- -- --   12/28/21 2328 -- -- -- -- -- 85 % Abnormal  15 L/min Mid flow nasal cannula --   12/28/21 22:22:25 97 8 °F (36 6 °C) 87 20 112/61 78 84 % Abnormal  -- -- --   12/28/21 2000 -- 86 -- 106/52 75 -- 15 L/min Mid flow nasal cannula --   12/28/21 1956 -- 86 20 106/52 -- 86 % Abnormal  -- -- Lying   12/28/21 1821 -- 84 22 110/59 -- 94 % 15 L/min Mid flow nasal cannula Lying   12/28/21 1736 -- 85 28 Abnormal  -- -- 91 % 15 L/min Mid flow nasal cannula Lying   12/28/21 1734 -- -- -- -- -- -- 15 L/min Mid flow nasal cannula --     Pertinent Labs/Diagnostic Test Results:     12/28 CXR -  Diffuse groundglass opacities are suspicious for Covid 19 pneumonia    Pulmonary edema would be less likely      12/28 ECG - Normal sinus rhythm  Possible Left atrial enlargement  RSR' or QR pattern in V1 suggests right ventricular conduction delay  Nonspecific T wave abnormality  Abnormal ECG      Results from last 7 days   Lab Units 12/29/21  0637 12/28/21  1807   WBC Thousand/uL 17 47* 21 43*   HEMOGLOBIN g/dL 12 8 15 3   HEMATOCRIT % 37 9 44 0   PLATELETS Thousands/uL 158 191   BANDS PCT %  --  2         Results from last 7 days   Lab Units 12/29/21  0637 12/28/21 2120   SODIUM mmol/L 143 130*   POTASSIUM mmol/L 3 1* 4 0   CHLORIDE mmol/L 109* 89*   CO2 mmol/L 16* 24   ANION GAP mmol/L 18* 17*   BUN mg/dL 22 33*   CREATININE mg/dL 0 76 1 32*   EGFR ml/min/1 73sq m 75 38   CALCIUM mg/dL 5 4* 7 6*     Results from last 7 days   Lab Units 12/29/21  0637 12/28/21 2120   AST U/L 17 28   ALT U/L 16 26   ALK PHOS U/L 89 146*   TOTAL PROTEIN g/dL 4 5* 7 3   ALBUMIN g/dL 1 1* 2 0*   TOTAL BILIRUBIN mg/dL 0 37 0 71     Results from last 7 days   Lab Units 12/29/21  1615 12/29/21  1055 12/29/21  0809 12/28/21  2214   POC GLUCOSE mg/dl 232* 210* 191* 230*     Results from last 7 days   Lab Units 12/29/21  0637 12/28/21  2120   GLUCOSE RANDOM mg/dL 143* 216*     Results from last 7 days   Lab Units 12/29/21  0639   CK TOTAL U/L 60     Results from last 7 days   Lab Units 12/29/21  0014 12/28/21  2359 12/28/21 2120   HS TNI 0HR ng/L  --   --  57*   HS TNI 2HR ng/L  --  58*  --    HSTNI D2 ng/L  --  1  --    HS TNI 4HR ng/L 40  --   --    HSTNI D4 ng/L -17  --   --      Results from last 7 days   Lab Units 12/29/21  1213 12/28/21  2120   D-DIMER QUANTITATIVE ug/ml FEU 10 35* 5 15*     Results from last 7 days   Lab Units 12/29/21  1213 12/29/21  0637 12/28/21  1807   PROCALCITONIN ng/ml 0 89* 0 70* 0 98*     Results from last 7 days   Lab Units 12/29/21  0637 12/29/21  0013 12/28/21  2359 12/28/21  2120   LACTIC ACID mmol/L 1 5 2 0 2 0 3 3*     Results from last 7 days   Lab Units 12/29/21  0639   NT-PRO BNP pg/mL 4,068*     Results from last 7 days   Lab Units 12/29/21  0637 12/28/21  2120   CRP mg/L 249 0* 469 8*     Results from last 7 days   Lab Units 12/28/21  1807 12/28/21  1806   BLOOD CULTURE  Received in Microbiology Lab  Culture in Progress  Received in Microbiology Lab  Culture in Progress  ED Treatment:   Medication Administration from 12/28/2021 1648 to 12/28/2021 2213    Date/Time Order Dose Route Action   12/28/2021 1817 ceftriaxone (ROCEPHIN) 1 g/50 mL in dextrose IVPB 1,000 mg Intravenous New Bag   12/28/2021 1816 sodium chloride 0 9 % bolus 1,000 mL 1,000 mL Intravenous New Bag   12/28/2021 2130 acetaminophen (TYLENOL) tablet 650 mg 650 mg Oral Given   12/28/2021 2130 dexamethasone (DECADRON) injection 6 mg 6 mg Intravenous Given   12/28/2021 2129 nystatin (MYCOSTATIN) oral suspension 500,000 Units 500,000 Units Swish & Swallow Given        History reviewed  No pertinent past medical history    Present on Admission:   Essential hypertension   Type 2 diabetes mellitus without complication, without long-term current use of insulin ([facility])      Admitting Diagnosis: Oral candidiasis [B37 0]  SOB (shortness of breath) [R06 02]  Acute respiratory failure with hypoxia ([facility]) [J96 01]  Pneumonia due to COVID-19 virus [U07 1, J12 82]  Age/Sex: 67 y o  female  Admission Orders:  Scheduled Medications:  aspirin, 81 mg, Oral, Daily  atorvastatin, 40 mg, Oral, Daily With Dinner  [START ON 12/30/2021] Baricitinib, 4 mg, Oral, Q24H  cefTRIAXone, 1,000 mg, Intravenous, Q24H  dexamethasone, 0 1 mg/kg, Intravenous, Q12H  doxycycline hyclate, 100 mg, Oral, Q12H  famotidine, 20 mg, Oral, Daily  insulin lispro, 1-5 Units, Subcutaneous, 4x Daily (AC & HS)  lidocaine, 1 patch, Topical, Daily  melatonin, 6 mg, Oral, HS  remdesivir, 100 mg, Intravenous, Q24H      Continuous IV Infusions:  heparin (porcine), 3-20 Units/kg/hr (Order-Specific), Intravenous, Titrated  IV 0 9% NSS @ 50 ml/hr - d/c on 12/29 @ 0853    PRN Meds:  acetaminophen, 650 mg, Oral, Q6H PRN -x 1 12/28  ALPRAZolam, 0 5 mg, Oral, BID PRN  calcium carbonate, 500 mg, Oral, BID PRN  heparin (porcine), 1,800 Units, Intravenous, Q1H PRN -x 1 12/29  heparin (porcine), 3,600 Units, Intravenous, Q1H PRN  HYDROmorphone, 0 2 mg, Intravenous, Q4H PRN  ondansetron, 4 mg, Intravenous, Q6H PRN  oxyCODONE, 5 mg, Oral, Q4H PRN    Isolation  Oxygen to keep sats > 90%  Heparin drip  Self proning  POC GLUCOSE AC/HS WITH SSI COVERAGE   Tele  Vitals q 4 hr   IP CONSULT TO PULMONOLOGY  IP CONSULT TO PALLIATIVE CARE    Network Utilization Review Department  ATTENTION: Please call with any questions or concerns to [phone] and carefully listen to the prompts so that you are directed to the right person  All voicemails are confidential   Cleotis Dirk all requests for admission clinical reviews, approved or denied determinations and any other requests to dedicated fax number below belonging to the campus where the patient is receiving treatment   List of dedicated fax numbers for the Facilities:  [address] DENIALS (Administrative/Medical Necessity) [phone]   [address] (Maternity/NICU/Pediatrics) [phone]   [address] [place] [address]  [phone]   [address] [facility] [place] [address] [id number] [place] [address] [address] 830 [facility] 10 [facility] [phone]

## 2021-12-29 NOTE — PLAN OF CARE
Problem: MOBILITY - ADULT  Goal: Maintain or return to baseline ADL function  Description: INTERVENTIONS:  -  Assess patient's ability to carry out ADLs; assess patient's baseline for ADL function and identify physical deficits which impact ability to perform ADLs (bathing, care of mouth/teeth, toileting, grooming, dressing, etc )  - Assess/evaluate cause of self-care deficits   - Assess range of motion  - Assess patient's mobility; develop plan if impaired  - Assess patient's need for assistive devices and provide as appropriate  - Encourage maximum independence but intervene and supervise when necessary  - Involve family in performance of ADLs  - Assess for home care needs following discharge   - Consider OT consult to assist with ADL evaluation and planning for discharge  - Provide patient education as appropriate  Outcome: Progressing  Goal: Maintains/Returns to pre admission functional level  Description: INTERVENTIONS:  - Perform BMAT or MOVE assessment daily    - Set and communicate daily mobility goal to care team and patient/family/caregiver  - Collaborate with rehabilitation services on mobility goals if consulted  - Perform Range of Motion times a day  - Reposition patient every  hours    - Dangle patient  times a day  - Stand patient  times a day  - Ambulate patient  times a day  - Out of bed to chair  times a day   - Out of bed for meals  times a day  - Out of bed for toileting  - Record patient progress and toleration of activity level   Outcome: Progressing     Problem: Potential for Falls  Goal: Patient will remain free of falls  Description: INTERVENTIONS:  - Educate patient/family on patient safety including physical limitations  - Instruct patient to call for assistance with activity   - Consult OT/PT to assist with strengthening/mobility   - Keep Call bell within reach  - Keep bed low and locked with side rails adjusted as appropriate  - Keep care items and personal belongings within reach  - Initiate and maintain comfort rounds  - Make Fall Risk Sign visible to staff  - Offer Toileting every  Hours, in advance of need  - Initiate/Maintain alarm  - Obtain necessary fall risk management equipment:   - Apply yellow socks and bracelet for high fall risk patients  - Consider moving patient to room near nurses station  Outcome: Progressing     Problem: PAIN - ADULT  Goal: Verbalizes/displays adequate comfort level or baseline comfort level  Description: Interventions:  - Encourage patient to monitor pain and request assistance  - Assess pain using appropriate pain scale  - Administer analgesics based on type and severity of pain and evaluate response  - Implement non-pharmacological measures as appropriate and evaluate response  - Consider cultural and social influences on pain and pain management  - Notify physician/advanced practitioner if interventions unsuccessful or patient reports new pain  Outcome: Progressing     Problem: INFECTION - ADULT  Goal: Absence or prevention of progression during hospitalization  Description: INTERVENTIONS:  - Assess and monitor for signs and symptoms of infection  - Monitor lab/diagnostic results  - Monitor all insertion sites, i e  indwelling lines, tubes, and drains  - Monitor endotracheal if appropriate and nasal secretions for changes in amount and color  - [place] appropriate cooling/warming therapies per order  - Administer medications as ordered  - Instruct and encourage patient and family to use good hand hygiene technique  - Identify and instruct in appropriate isolation precautions for identified infection/condition  Outcome: Progressing  Goal: Absence of fever/infection during neutropenic period  Description: INTERVENTIONS:  - Monitor WBC    Outcome: Progressing     Problem: SAFETY ADULT  Goal: Maintain or return to baseline ADL function  Description: INTERVENTIONS:  -  Assess patient's ability to carry out ADLs; assess patient's baseline for ADL function and identify physical deficits which impact ability to perform ADLs (bathing, care of mouth/teeth, toileting, grooming, dressing, etc )  - Assess/evaluate cause of self-care deficits   - Assess range of motion  - Assess patient's mobility; develop plan if impaired  - Assess patient's need for assistive devices and provide as appropriate  - Encourage maximum independence but intervene and supervise when necessary  - Involve family in performance of ADLs  - Assess for home care needs following discharge   - Consider OT consult to assist with ADL evaluation and planning for discharge  - Provide patient education as appropriate  Outcome: Progressing  Goal: Maintains/Returns to pre admission functional level  Description: INTERVENTIONS:  - Perform BMAT or MOVE assessment daily    - Set and communicate daily mobility goal to care team and patient/family/caregiver  - Collaborate with rehabilitation services on mobility goals if consulted  - Perform Range of Motion  times a day  - Reposition patient ever hours    - Dangle patient  times a day  - Stand patient  times a day  - Ambulate patient  times a day  - Out of bed to kajal times a day   - Out of bed for meals  times a day  - Out of bed for toileting  - Record patient progress and toleration of activity level   Outcome: Progressing  Goal: Patient will remain free of falls  Description: INTERVENTIONS:  - Educate patient/family on patient safety including physical limitations  - Instruct patient to call for assistance with activity   - Consult OT/PT to assist with strengthening/mobility   - Keep Call bell within reach  - Keep bed low and locked with side rails adjusted as appropriate  - Keep care items and personal belongings within reach  - Initiate and maintain comfort rounds  - Make Fall Risk Sign visible to staff  - Offer Toileting every  Hours, in advance of need  - Initiate/Maintain alarm  - Obtain necessary fall risk management equipment:   - Apply yellow socks and bracelet for high fall risk patients  - Consider moving patient to room near nurses station  Outcome: Progressing     Problem: DISCHARGE PLANNING  Goal: Discharge to home or other facility with appropriate resources  Description: INTERVENTIONS:  - Identify barriers to discharge w/patient and caregiver  - Arrange for needed discharge resources and transportation as appropriate  - Identify discharge learning needs (meds, wound care, etc )  - Arrange for interpretive services to assist at discharge as needed  - Refer to Case Management Department for coordinating discharge planning if the patient needs post-hospital services based on physician/advanced practitioner order or complex needs related to functional status, cognitive ability, or social support system  Outcome: Progressing     Problem: Knowledge Deficit  Goal: Patient/family/caregiver demonstrates understanding of disease process, treatment plan, medications, and discharge instructions  Description: Complete learning assessment and assess knowledge base    Interventions:  - Provide teaching at level of understanding  - Provide teaching via preferred learning methods  Outcome: Progressing

## 2021-12-29 NOTE — ASSESSMENT & PLAN NOTE
-patient has history of essential hypertension  -home medications include chlorthalidone 25 mg daily and losartan 100 mg daily  -Blood pressure currently low-normal due to COVID pneumonia, dehydration, and sepsis  -Continue to hold antihypertensives and monitor  -Current blood pressure 112/53

## 2021-12-29 NOTE — SPEECH THERAPY NOTE
Speech Language/Pathology  Speech/Language Pathology  Assessment    Patient Name: Amanda Hernandez  [id number] Date: 12/29/2021     Problem List  Principal Problem:    Acute respiratory failure with hypoxia ([facility] )  Active Problems:    Diabetes mellitus ([facility] )    Essential hypertension    Sepsis ([facility] )    COVID-19    Past Medical History  History reviewed  No pertinent past medical history  Past Surgical History  History reviewed  No pertinent surgical history  Bedside Swallow Evaluation:    Summary:  Pt on midflow and also NRB  Mask was lowered for PO presentations and placed back on if sats reaches 86  Pt did not appear to be in any distress  Ableto feed self but fed by me initially  Pointed to the item she wanted (cream of wheat, pureed egg, applesauce)  Refused to drink anything initially  Good acceptance from spoon, control, transfer, and prompt swallow w/ adequate appearing laryngeal rise  No cough or wet voice  Agreeable to juice when she was finished eating  Took one small sip, swallowed promptly but then pointed to chest and refused any more  No s/s aspiration  ? Esophageal dysphagia  Also reported oral candida which may be contributing if in esophagus as well  Oral and pharyngeal stages were WNL for items taken today  Recommendations:  Diet: Puree for now  Liquid: thin  Meds: may nned to crush, Trial small pills whole to begin as pt accepts  Supervision: intermittent (later the pt was feeing self and taking mask off and on independently)  Positioning:Upright  Strategies: Pt to take PO/Meds only when fully alert and upright  Oral care  Aspiration precautions  Reflux precautions  Therapy Prognosis: guarded given covid, otherwise good    Frequency: 2-5x/wk as able    Consider consult w/:  Nutrition    H&P/Admit info, pertinent provider notes/procedures/studies/PMH:(copied and placed in this report)    Chief Complaint:   covid  History of Present Illness:  Amanda Hernandez is a 67 y o  female who presents with pmh of htn, niddm coming to hospital for sob/cough  Pt is Cantonese speaking only  Multiple attempts at translation failed due to dialect issues with translation services and pt   hpi constructed by d/w ed documentation and daughter/granddaughter by telephone  Pt has been ill for the last week to 10 days  She has been having sob/cough abd pain decreased po intake w/intermittent dysphagia w/globus sensation to solid foods  She came to ed and was tested positive for covid 19  She was placed on midflow 15L/min and then nrb in addition to this as pt was still hypoxic in mid 80%s O2 sat  Admission was requested  Goal(s):  Pt will tolerate least restrictive diet w/out s/s aspiration or oral/pharyngeal difficulties  Patient's goal: none stated "thank you"    Reason for consult:  R/o aspiration  Determine safest and least restrictive diet  respiratory compromise  C/o solid food dysphagia/gl;obus sensation w/ solids  Precautions:  Contact  [place]  Current diet:  Puree w/ thin  Premorbid diet[de-identified]  ? Regular w/ thin  Previous VBS:  None known  O2 requirement:  Midflow 16  and nrb  Voice/Speech:  Cantonese  Spoke a  few english words  Social:  Unknown at this time  Follows commands:  basic                    Cognitive Status:  Alert and appropriate  Able to make needs known  Oral mech exam:  Dentition:natural  Labial strength and ROM:wnl  Lingual strength and ROM:appeared wfl/wnl  Mandibular strength and ROM:dnt  Secretion management:wnl    Esophageal stage:  Pointed to lower chest after liquid sip today       Results d/w:  Pt, nursing, physician

## 2021-12-29 NOTE — ED NOTES
Patient repeatedly pulling mask off throughout the shift  Patient educated multiple times with multiple different interpreters to keep her mask on  Patient still is pulling mask off and then complaining she can't breathe        Terri Ivory RN  12/28/21 [id number]

## 2021-12-29 NOTE — ASSESSMENT & PLAN NOTE
poa by tachypnea/significant leucocytosis  -Cmp/lactic acid pending  -Likely 2* covid pna  -rec'd rocephin in ed will continue w/rocephin doxycycline since high likelihood to transition to hfnc and significant white count  -Monitor cbc/vs bcx, f/u procalcitonin

## 2021-12-29 NOTE — CASE MANAGEMENT
Case Management Assessment & Discharge Planning Note    Patient name Zoila Tena  Location [facility] 2 /South 2 Collin Chavez* MRN [id number]  : 1945 Date 2021       Current Admission Date: 2021  Current Admission Diagnosis:Acute respiratory failure with hypoxia [facility])   Patient Active Problem List    Diagnosis Date Noted    Sepsis ([facility] ) 2021    Acute respiratory failure with hypoxia ([facility] ) 2021    Pneumonia due to COVID-19 virus 2021    Peripheral vascular disease, unspecified ([place] [address] ) 2021    Type 2 diabetes mellitus without complication, without long-term current use of insulin ([place] [address] ) 2019    Mixed hyperlipidemia 2019    Essential hypertension 2019    Viral upper respiratory tract infection 2019    Osteoarthritis of both knees 2019    Gastroesophageal reflux disease without esophagitis 2019      LOS (days): 1  Geometric Mean LOS (GMLOS) (days): 4 80  Days to GMLOS:4 1     OBJECTIVE:    Risk of Unplanned Readmission Score: 12         Current admission status: Inpatient       Preferred Pharmacy:   [id number] [address] [facility] [address]  Phone: [phone] Fax: [phone]    Primary Care Provider: Mitzi Penn MD    Primary Insurance: Neva Johnson [facility] RE[facility]  Secondary Insurance: [address] [place] [address]    ASSESSMENT:  [address] [facility] Representative - Daughter   Primary Phone: [phone] (Home)               Advance Directives  Does patient have a [address]?: No  Was patient offered paperwork?: No (talked to Daughter who states she and sister are NOK-   and they are only other family- pt speaks a dialect of Cantoneese not available on language line)  Does patient currently have a Health Care decision maker?: Yes, please see Health Care Proxy section  Does patient have Advance Directives?: No  Was patient offered paperwork?: No (talked to Daughter who states she and sister are NOK-   and they are only other family- pt speaks a dialect of Cantoneese not available on language line)  Primary Contact: Macario Mccray [phone]         Readmission Root Cause  30 Day Readmission: No    Patient Information  Admitted from[de-identified] Home  Mental Status: Alert  During Assessment patient was accompanied by: Not accompanied during assessment  Assessment information provided by[de-identified] Daughter  Primary Caregiver: Self  Support Systems: [facility] of Residence: [facility] do you live in?: [address] [place] [address] [facility] entry access options  Select all that apply : Stairs  Number of steps to enter home  : 1  Do the steps have railings?: No  Type of Current Residence: 3 [place] home  Upon entering residence, is there a bedroom on the main floor (no further steps)?: No  A bedroom is located on the following floor levels of residence (select all that apply):: 3rd Floor  Upon entering residence, is there a bathroom on the main floor (no further steps)?: No  Indicate which floors of current residence have a bathroom (select all the apply):: 3rd Floor  Number of steps to 3rd floor from main floor: One Flight (13 steps to main floor w/ (R) HR and 13 steps w/ (R) HR to Bedroom/bathroom- unable to live on main level)  In the last 12 months, was there a time when you were not able to pay the mortgage or rent on time?: No  In the last 12 months, was there a time when you did not have a steady place to sleep or slept in a shelter (including now)?: No  Living Arrangements: Lives w/ Daughter (grandchildren (13y o female and 13y  o male stays after school))  Is patient a ?: No    Activities of Daily Living Prior to Admission  Functional Status: Independent  Completes ADLs independently?: Yes  Ambulates independently?: Yes (Has some sifficulty navigating steps needing supervision)  Does patient use assisted devices?: No  Does patient currently own DME?: No  Does patient have a history of Outpatient Therapy (PT/OT)?: No  Does the patient have a history of Short-Term Rehab?: No  Does patient have a history of HHC?: No  Does patient currently have [facility]?: No         Patient Information Continued  Income Source: SSI/SSD  Does patient have prescription coverage?: Yes  Within the past 12 months, you worried that your food would run out before you got the money to buy more : Never true  Within the past 12 months, the food you bought just didnt last and you didnt have money to get more : Never true  Does patient have a history of substance abuse?: No  Does patient have a history of Mental Health Diagnosis?: No         Means of Transportation  Means of Transport to Appts[de-identified] Family transport  In the past 12 months, has lack of transportation kept you from medical appointments or from getting medications?: No  In the past 12 months, has lack of transportation kept you from meetings, work, or from getting things needed for daily living?: No        DISCHARGE DETAILS: PT/OT pending- will address recommendations with Dtr at that time- she is leery to have pt in [address] [place] [address] due to language barrier and inability to use language line as her Cantonese dialect not available  Disposition will lie heavily on LOC needed  She also inquired on possible HHA assessment referral- explained process for same, being understanding of inability to place same now and will depend on decided disposition- will cont to follow to assist with dc poc and aftercare needs      Discharge planning discussed with[de-identified] Pt's daughter        CM contacted family/caregiver?: Yes

## 2021-12-29 NOTE — ASSESSMENT & PLAN NOTE
2* covid pna  -Wet read cxr concerning for b/l infiltrates in mid/lower lungs  -Requiring 15 L midflow on admission  -Continue to monitor and maintain O2 sat >90%  -Admit to level 2 sdu

## 2021-12-29 NOTE — QUICK NOTE
Pt's oxygenation continues to decline  D/w pts' respiratory therapist : currently on 100% High Flow plus NRB and sats 83%  D/w daughters Frank Salinas and Collette Palencia via phone:   They confirm that pt is full code and wishes full intubation/resuscitation if needed  D/w critical care team

## 2021-12-29 NOTE — H&P
75[address] FRESS [birth year], 67 y o  female MRN: [id number]  Unit/Bed#: [facility] 2 [address] 218-01 Encounter: [id number]  Primary Care Provider: Tee Torres MD   Date and time admitted to hospital: 12/28/2021  5:16 PM    * [facility] with [facility] ([facility] )  Assessment & Plan  2* covid pna  -Wet read cxr concerning for b/l infiltrates in mid/lower lungs  -Requiring 15 L midflow on admission  -Continue to monitor and maintain O2 sat >90%  -Admit to level 2 sdu    COVID-19  Assessment & Plan  Admitted on moderate pathway 15L midflow  -Start dexamethasone D1  -remdesivir d1  -Rocephin/doxycycline until serial procalcitonin negative given significant leucocytosis and high likelihood for transition to hfnc  -follow up ddimer for [facility] per guidelines  -trend cbc/cmp/crp/ddimer  -if transitions to hfnc will add baricitinib    Sepsis ([facility] )  Assessment & Plan  poa by tachypnea/significant leucocytosis  -Cmp/lactic acid pending  -Likely 2* covid pna  -rec'd rocephin in ed will continue w/rocephin doxycycline since high likelihood to transition to hfnc and significant white count  -Monitor cbc/vs bcx, f/u procalcitonin    Essential hypertension  Assessment & Plan  Hold chlorthalidone/losartan until creatinine results  -Add hydralazine 10mg Iv q6h  prn sbp >180mmHg    Diabetes mellitus ([facility] )  Assessment & Plan  Lab Results   Component Value Date    HGBA1C 9 2 (H) 05/26/2021       No results for input(s): POCGLU in the last 72 hours      Blood Sugar Average: Last 72 hrs:   previously on empaglifozin and janumet  -Hold oral meds given risk for yari  -start ssi/poc bs while on dexamethasone    Dysphagia   Intermittent solid dysphagia   Consult slp bedside swallow eval   Dysphagia 2 thin liquids for now   Elevate hob aspiration precautions    VTE Prophylaxis: pending ddimer/cmp  / sequential compression device   Code Status: fc  POLST: There is no POLST form on file for this patient (pre-hospital)  Discussion with family: dispo s/sx workup    Anticipated Length of Stay:  Patient will be admitted on an Inpatient basis with an anticipated length of stay of  Greater than 2 midnights  Justification for Hospital Stay: covid and respiratory failure    Total Time for Visit, including Counseling / Coordination of Care: 45 minutes  Greater than 50% of this total time spent on direct patient counseling and coordination of care  Chief Complaint:   covid    History of Present Illness:    Lakshmi Fitzpatrick is a 67 y o  female who presents with pmh of htn, niddm coming to hospital for sob/cough  Pt is Cantonese speaking only  Multiple attempts at translation failed due to dialect issues with translation services and pt   hpi constructed by d/w ed documentation and daughter/granddaughter by telephone  Pt has been ill for the last week to 10 days  She has been having sob/cough abd pain decreased po intake w/intermittent dysphagia w/globus sensation to solid foods  She came to ed and was tested positive for covid 19  She was placed on midflow 15L/min and then nrb in addition to this as pt was still hypoxic in mid 80%s O2 sat  Admission was requested  Review of Systems:    Review of Systems   Constitutional: Positive for appetite change  HENT: Positive for trouble swallowing  Respiratory: Positive for cough and shortness of breath  Cardiovascular: Negative for chest pain  Gastrointestinal: Positive for abdominal pain  Negative for diarrhea, nausea and vomiting  Neurological: Positive for weakness  All other systems reviewed and are negative  Past Medical and Surgical History:     History reviewed  No pertinent past medical history  History reviewed  No pertinent surgical history  Meds/Allergies:    Prior to Admission medications    Medication Sig Start Date End Date Taking?  Authorizing Provider   acetaminophen (TYLENOL) 500 mg tablet Take 1 tablet (500 mg total) by mouth every 6 (six) hours as needed for mild pain 1/21/20   Jagdeep Moore MD   Aspirin Low Dose 81 MG EC tablet TAKE 1 TABLET BY MOUTH EVERY DAY 11/22/21   Jagdeep Moore MD   atorvastatin (LIPITOR) 20 mg tablet TAKE 1 TABLET BY MOUTH EVERY DAY 10/9/21   Jagdeep Moore MD   Blood Pressure Monitor KIT Use daily 5/26/21   Jagdeep Moore MD   chlorthalidone 25 mg tablet TAKE 1 TABLET BY MOUTH EVERY DAY 1/18/21   Jagdeep Moore MD   Empagliflozin 25 MG TABS Take 1 tablet (25 mg total) by mouth every morning 12/20/21   Jagdeep Moore MD   ibuprofen (MOTRIN) 400 mg tablet TAKE 1 TABLET (400 MG TOTAL) BY MOUTH EVERY 6 (SIX) HOURS AS NEEDED FOR MILD PAIN 2/11/21   Jagdeep Moore MD   Janumet  MG per tablet TAKE 1 TABLET BY MOUTH TWICE A DAY WITH MEALS 10/14/21   Jagdeep Moore MD   loratadine (CLARITIN) 10 mg tablet TAKE 1 TABLET BY MOUTH EVERY DAY 1/12/21   Jagdeep Moore MD   losartan (COZAAR) 100 MG tablet TAKE 1 TABLET BY MOUTH EVERY DAY 12/15/21   Jagdeep Moore MD   meclizine (ANTIVERT) 25 mg tablet Take 1 tablet (25 mg total) by mouth every 8 (eight) hours 12/20/21   Jagdeep Moore MD   ondansetron (Zofran ODT) 4 mg disintegrating tablet Take 1 tablet (4 mg total) by mouth every 6 (six) hours as needed for nausea or vomiting 12/20/21   Jagdeep Moore MD   potassium chloride (Klor-Con) 10 mEq tablet TAKE 1 TABLET (10 MEQ TOTAL) BY MOUTH 2 (TWO) TIMES A DAY 12/1/21   Jagdeep Moore MD   triamcinolone (KENALOG) 0 5 % cream APPLY TO AFFECTED AREA 3 TIMES A DAY 11/5/20   Jagdeep Moore MD   omeprazole (PriLOSEC) 20 mg delayed release capsule TAKE 1 CAPSULE BY MOUTH EVERY DAY 9/24/21 12/20/21  Jagdeep Moore MD     I have reviewed home medications with patient family member      Allergies: No Known Allergies    Social History:     Marital Status: /Civil Union   Occupation:   Patient Pre-hospital Living Situation:   Patient Pre-hospital Level of Mobility:   Patient Pre-hospital Diet Restrictions:   Substance Use History:   Social History     Substance and Sexual Activity   Alcohol Use Never     Social History     Tobacco Use   Smoking Status Never Smoker   Smokeless Tobacco Never Used     Social History     Substance and Sexual Activity   Drug Use Never       Family History:    Family History   Problem Relation Age of Onset    No Known Problems Mother     No Known Problems Father     No Known Problems Sister     No Known Problems Daughter     No Known Problems Maternal Grandmother     No Known Problems Maternal Grandfather     No Known Problems Paternal Grandmother     No Known Problems Paternal Grandfather     No Known Problems Sister     No Known Problems Sister     No Known Problems Daughter     No Known Problems Daughter        Physical Exam:     Vitals:   Blood Pressure: 112/61 (12/28/21 2222)  Pulse: 87 (12/28/21 2222)  Temperature: 97 8 °F (36 6 °C) (12/28/21 2222)  Temp Source: Oral (12/28/21 1718)  Respirations: 20 (12/28/21 2222)  Weight - Scale: 61 7 kg (136 lb 0 4 oz) (12/28/21 1718)  SpO2: (!) 84 % (12/28/21 2222)    Physical Exam  Vitals reviewed  Constitutional:       General: She is not in acute distress  Appearance: She is ill-appearing  She is not toxic-appearing or diaphoretic  HENT:      Head: Normocephalic and atraumatic  Right Ear: External ear normal       Left Ear: External ear normal       Nose: Nose normal    Eyes:      Extraocular Movements: Extraocular movements intact  Cardiovascular:      Rate and Rhythm: Normal rate and regular rhythm  Heart sounds: No murmur heard  No friction rub  No gallop  Pulmonary:      Effort: No respiratory distress  Breath sounds: Rales present  No wheezing or rhonchi  Abdominal:      General: There is no distension  Palpations: Abdomen is soft  There is no mass  Tenderness: There is no abdominal tenderness  There is no guarding or rebound  Hernia: No hernia is present  Musculoskeletal:      Cervical back: Normal range of motion        Right lower leg: Edema present  Left lower leg: Edema (1+ b/l edema) present  Skin:     General: Skin is warm  Neurological:      Mental Status: She is alert  Comments: Alert and moves all 4 extremities   Psychiatric:         Mood and Affect: Mood normal          (  Be Sure to Include Physical Exam: Delete this entire line when you have entered your exam)    Additional Data:     Lab Results: I have personally reviewed pertinent reports  Results from last 7 days   Lab Units 12/28/21  1807   WBC Thousand/uL 21 43*   HEMOGLOBIN g/dL 15 3   HEMATOCRIT % 44 0   PLATELETS Thousands/uL 191   BANDS PCT % 2   LYMPHO PCT % 3*   MONO PCT % 1*   EOS PCT % 0     Results from last 7 days   Lab Units 12/28/21  2120   SODIUM mmol/L 130*   POTASSIUM mmol/L 4 0   CHLORIDE mmol/L 89*   CO2 mmol/L 24   BUN mg/dL 33*   CREATININE mg/dL 1 32*   ANION GAP mmol/L 17*   CALCIUM mg/dL 7 6*   ALBUMIN g/dL 2 0*   TOTAL BILIRUBIN mg/dL 0 71   ALK PHOS U/L 146*   ALT U/L 26   AST U/L 28   GLUCOSE RANDOM mg/dL 216*         Results from last 7 days   Lab Units 12/28/21  2214   POC GLUCOSE mg/dl 230*         Results from last 7 days   Lab Units 12/28/21  2120   LACTIC ACID mmol/L 3 3*       Imaging: I have personally reviewed pertinent reports  cxr wet read personally reviewed w/concern for bilateral pneumonia in mid /lower lungs       XR chest 1 view portable   ED Interpretation by Mariajose Espinal MD (12/28 1843)   COVID          EKG, Pathology, and Other Studies Reviewed on Admission:   · EKG: nsr   twi in inferior and anteroseptal leads    Allscripts / Epic Records Reviewed: Yes     ** Please Note: This note has been constructed using a voice recognition system   **

## 2021-12-29 NOTE — ASSESSMENT & PLAN NOTE
-patient presented with sepsis, poa with tachypnea/significant leuckocytosis  -patient started on the COVID treatment protocol with remdesivir/dexamethasone  -as she is in the [place] disease category she is also on ceftriaxone/doxycycline  -anticipate discontinuing antibiotics if procalcitonin negative x2

## 2021-12-29 NOTE — CONSULTS
Consultation - [address]  67 y o   female  ICU 07/ICU 07   MRN: [id number]  Encounter: [id number]    ASSESSMENT:    Patient Active Problem List   Diagnosis    Type 2 diabetes mellitus without complication, without long-term current use of insulin ([facility] )    Mixed hyperlipidemia    Essential hypertension    Viral upper respiratory tract infection    Osteoarthritis of both knees    Gastroesophageal reflux disease without esophagitis    Peripheral vascular disease, unspecified ([facility] )    Sepsis ([facility] )    Acute respiratory failure with hypoxia ([place] [address] )    Pneumonia due to COVID-19 virus       Active problems addressed:  · Covid-19 PNA  · Acute hypoxic respiratory failure  · DM  · Goals of care    Consult is for goals    PLAN:    1  Goals:    Long discussion with family - listed contact/daughter/Elsa who put her daughter/patient's granddaughter/Jessica on the phone for translation  Discussed the ff points:  o All efforts currently being undertaken for covid-19 infection is geared to support the body while it fights the infection  When the body is weak to begin with, infection can worsen despite our best treatment options  o Intubation in general is needed when the body no longer is able to adequately get oxygen or when the body is getting tired of breathing against a very bad insult to the lungs  o Intubation and mechanical ventilation, however, will not cure covid-19 infection  It will just give the body a break from breathing on its own  Therefore, intubation will not guarantee a good outcome  Things can still go [address] when patients get intubated    o Sometimes, people can get a trach and a feeding tube  But in her case, this may not be an option given possible high oxygen requirement     o Given this possibility of no trach/PEG, we can only wish for improvement until we can get the tube out OR they may have to make the difficult decision to remove the tube and make her comfortable as she approaches end of life    o Also discussed possible cardiac arrest even on the vent  If this happens, its a sign that the body is no longer able to sustain life on its own due to the overwhelming covid infection  They can choose to revive her, which will not make covid infection better, or they can choose to allow her this natural death   At this time, the family is opting to continue all management to "save her", including resuscitation/chest compression  I said that we always try our best but they must prepare for the worst     Family request to call daughter/Elsa, who will have Philip Redmond on the phone to provide translation, for any updates  There is another daughter who is currently at work at the time of my call  Both assured me that everyone is on the same page   D/w ICU    Code status: Level 1 - Full Code   Decisional apparatus:  Patient does not have capacity to make medical decisions on my exam today  If such capacity is lost, patient's substitute decision maker would default to daughter by [place]  Advance Directive / Living Will / POLST:  None on file    We appreciate the opportunity to participate in this patient's care  We will continue to follow  Please do not hesitate to contact our on-call provider through our clinic answering service at [phone] should you have acute symptom control concerns  IDENTIFICATION:  Inpatient consult to Palliative Care  Consult performed by: Sanjay Hurtado MD  Consult ordered by: Saintclair Parson, [address]        Reason for Consult / Principal Problem: goals of care    HISTORY OF PRESENT ILLNESS:    Julio Gamez is a 67 y o  female with DM, HTN, who as admitted on 12/28 with acute hypoxic respiratory failure due to covid-19 PNA requiring HFNC for O2 supplementation  Today, she is persistently hypoxic despite escalating oxygen supplementation  SLIM spoke to family and visited goals  She is now upgraded to the  ICU for higher level of care   ICU requested Palliative to clarify goals  Per review of notes, patient is cantonese speaking only and team had been calling daughter and granddaughter for updates  I saw her briefly in the ICU where she looked in some distress from SOB  Called patient's family  Rest of conversation as above  Interview and exam limited by: language barrier, respiratory distress    Review of Systems   Unable to perform ROS: Other       History reviewed  No pertinent past medical history  History reviewed  No pertinent surgical history  Social History     Socioeconomic History    Marital status: /Civil Union     Spouse name: Not on file    Number of children: Not on file    Years of education: Not on file    Highest education level: Not on file   Occupational History    Not on file   Tobacco Use    Smoking status: Never Smoker    Smokeless tobacco: Never Used   Vaping Use    Vaping Use: Never used   Substance and Sexual Activity    Alcohol use: Never    Drug use: Never    Sexual activity: Not on file   Other Topics Concern    Not on file   Social History Narrative    Not on file     Social Determinants of Health     Financial Resource Strain: Not on file   Food Insecurity: No Food Insecurity    Worried About [address] in the Last Year: Never true    [address] in the Last Year: Never true   Transportation Needs: No Transportation Needs    Lack of Transportation (Medical): No    Lack of Transportation (Non-Medical):  No   Physical Activity: Not on file   Stress: Not on file   Social Connections: Not on file   Intimate Partner Violence: Not on file   Housing Stability: Unknown    Unable to Pay for Housing in the Last Year: No    Number of Places Lived in the Last Year: Not on file    Unstable Housing in the Last Year: No     Family History   Problem Relation Age of Onset    No Known Problems Mother     No Known Problems Father     No Known Problems Sister     No Known Problems Daughter    Mc No Known Problems Maternal Grandmother     No Known Problems Maternal Grandfather     No Known Problems Paternal Grandmother     No Known Problems Paternal Grandfather     No Known Problems Sister     No Known Problems Sister     No Known Problems Daughter     No Known Problems Daughter        MEDICATIONS / ALLERGIES:  all current active meds have been reviewed    No Known Allergies    OBJECTIVE:  /53   Pulse 94   Temp 98 °F (36 7 °C)   Resp 18   Wt 61 7 kg (136 lb 0 4 oz)   SpO2 (!) 87%   BMI 27 01 kg/m²   Physical Exam:  Constitutional: Appears well-developed and well-nourished  Appears younger than stated age, however appears acutely ill and somewhat toxic-appearing  In no acute physical or emotional distress  Head: Normocephalic and atraumatic  Lips dry and cracking  Eyes: Eyes closed  Lids normal  Neck: No visible adenopathy or masses  Respiratory: Slightly tachypneic  Using accessory mm to breath  On BiPAP  Gastrointestinal: No abdominal distension  Musculoskeletal: No edema  Neurological: appears awake, eyes closed  Skin: Dry, no diaphoresis  Pale   Psychiatric: unable to assess    Lab Results: I have personally reviewed pertinent labs  Imaging Studies: I have personally reviewed pertinent reports  EKG, Pathology, and Other Studies: I have personally reviewed pertinent reports  Counseling / Coordination of Care:  Counseling / Coordination of Care  Total floor / unit time spent today 30 minutes  Greater than 50% of total time was spent with the patient and / or family counseling and / or coordination of care  A description of the counseling / coordination of care: provided medical updates, discussed palliative care, determined competency, determined goals of care, determined POA, determined social/family support, discussed plans of care, discussed symptom management, provided psychosocial support       Sanjay Hurtado MD  [facility]

## 2021-12-29 NOTE — ASSESSMENT & PLAN NOTE
Lab Results   Component Value Date    HGBA1C 9 2 (H) 05/26/2021       Recent Labs     12/28/21  2214 12/29/21  0809 12/29/21  1055   POCGLU 230* 191* 210*       Blood Sugar Average: Last 72 hrs:  (P) 258 [id number]   -Patient has history of diabetes type 2, without long-term use of insulin without complication  -previously on empaglifozin and janumet  -Hold oral meds while inpatient  -Accu-Cheks, and sliding scale insulin

## 2021-12-29 NOTE — ASSESSMENT & PLAN NOTE
-patient presented with acute respiratory failure with hypoxia secondary to bilateral COVID pneumonia   -patient was initially requiring 15 L mid flow  -she has decompensated and is currently requiring 15 L midflow, and a non-rebreather to maintain saturations 85%  -patient was placed on the COVID treatment protocol, and escalated to severe category of disease  -discussed with patient's daughter if she were to decompensate further she may require transfer to ICU  -confer with the pulmonary/critical care team

## 2021-12-29 NOTE — ASSESSMENT & PLAN NOTE
Admitted on moderate pathway 15L midflow  -Start dexamethasone D1  -remdesivir d1  -Rocephin/doxycycline until serial procalcitonin negative given significant leucocytosis and high likelihood for transition to hfnc  -follow up ddimer for [facility] per guidelines  -trend cbc/cmp/crp/ddimer  -if transitions to hfnc will add baricitinib

## 2021-12-29 NOTE — ASSESSMENT & PLAN NOTE
Hold chlorthalidone/losartan until creatinine results  -Add hydralazine 10mg Iv q6h  prn sbp >180mmHg

## 2021-12-29 NOTE — CONSULTS
Consultation - Pulmonary Medicine   Martell Lundy 67 y o  female MRN: [id number]  Unit/Bed#: [place] [address] -01 Encounter: [id number]      Assessment/Plan:    1  Acute hypoxic respiratory failure secondary to COVID 19       -  currently -high-flow nasal cannula-on 100% FiO2/NRB L- 89%, patient does not wear home O2       -  continue saturations greater than 89%       -  pulmonary toilet:  Deep breathing cough, OOB as tolerated, IS Q 1 hr       -  will need oxygen therapy upon discharge    2  COVID-19- 12/20/2021       -  Day # 1/14- Baracitinib       -  Day #2/5- Rocephin/doxycycline       -  Day # 2/5- remdesivir       -  Day # 1- 0 1 mg/kg- Decadron-b i d  Day to steroids       -  CRP- 469 8-- 249       -  pending lab:  Procalcitonin, D-dimer    Will continue high-dose steroids, will wait for D-dimer, continue antibiotics until repeat procalcitonin x 2, patient high risk for quickly decompensating, will need to address goals of care with family        History of Present Illness   Physician Requesting Consult: Marvin Carney MD  Reason for Consult / Principal Problem:  Acute hypoxic respiratory failure  Hx and PE limited by:  Language barrier  HPI: Angela Flood is a 67 y o   female who presented to [facility]  with complaints of shortness of breath  Patient has past medical history of hyperlipidemia, hyper tension and back pain  Patient reports that over last several days she has had increasing shortness of breath and feeling ill for approximately 10 days  Patient began developing worsening cough and shortness of breath along with decreased oral intake  Per ED note patient was noted to be hypoxic at 24% on room air  Patient was placed on 15 L non-rebreather and still maintaining hypoxia in the 80s  Patient was placed on COVID 19 protocol  Pulmonary was consulted for acute hypoxic respiratory failure    Today upon examination patient did not appear in overt respiratory distress however was requiring significant amount of oxygen  Patient had diminished breath sounds with some scattered crackles at the lower bases today upon examination  Patient currently denying any fevers, chills hemoptysis, headaches, night sweats, pleuritic chest pain, or palpitations  From a pulmonary standpoint, patient does not follow with a pulmonologist   Patient has been a lifelong nonsmoker  Patient has never been diagnosed with COPD or asthma  Patient does report history of seasonal allergies in which she is maintained on Claritin  Patient denies having any pets  Patient denies any symptoms of GERD, MARISEL, or postnasal drip  Patient denies any acute exposures to dust, mold, asbestos, or silica  Inpatient consult to Pulmonology  Consult performed by: ABIEL Lilly  Consult ordered by: Dieter Garland PA-C          Review of Systems   Constitutional: Positive for activity change  Negative for appetite change  HENT: Negative for congestion and dental problem  Respiratory: Positive for cough and shortness of breath  Negative for apnea, choking, chest tightness, wheezing and stridor  Cardiovascular: Negative for chest pain, palpitations and leg swelling  Gastrointestinal: Negative for abdominal distention and abdominal pain  Genitourinary: Negative for difficulty urinating and dyspareunia  Musculoskeletal: Negative for arthralgias and back pain  Psychiatric/Behavioral: Negative for agitation and behavioral problems  Historical Information   History reviewed  No pertinent past medical history  History reviewed  No pertinent surgical history    Social History   Social History     Substance and Sexual Activity   Alcohol Use Never     Social History     Substance and Sexual Activity   Drug Use Never     Social History     Tobacco Use   Smoking Status Never Smoker   Smokeless Tobacco Never Used     E-Cigarette/Vaping    E-Cigarette Use Never User      E-Cigarette/Vaping Substances Occupational History:  Noncontributory    Family History:   Family History   Problem Relation Age of Onset    No Known Problems Mother     No Known Problems Father     No Known Problems Sister     No Known Problems Daughter     No Known Problems Maternal Grandmother     No Known Problems Maternal Grandfather     No Known Problems Paternal Grandmother     No Known Problems Paternal Grandfather     No Known Problems Sister     No Known Problems Sister     No Known Problems Daughter     No Known Problems Daughter        Meds/Allergies   pertinent pulmonary meds have been reviewed    No Known Allergies    Objective   Vitals: Blood pressure 112/53, pulse 94, temperature 98 °F (36 7 °C), resp  rate 18, weight 61 7 kg (136 lb 0 4 oz), SpO2 (!) 87 %  15L,Body mass index is 27 01 kg/m²  Intake/Output Summary (Last 24 hours) at 12/29/2021 1227  Last data filed at 12/29/2021 0501  Gross per 24 hour   Intake 3310 ml   Output --   Net 3310 ml     Invasive Devices  Report    Peripheral Intravenous Line            Peripheral IV 12/28/21 Distal;Dorsal (posterior); Right Wrist <1 day                Physical Exam  Constitutional:       General: She is not in acute distress  Appearance: Normal appearance  She is normal weight  She is not ill-appearing  HENT:      Head: Normocephalic and atraumatic  Nose: Nose normal  No congestion or rhinorrhea  Mouth/Throat:      Mouth: Mucous membranes are dry  Pharynx: No oropharyngeal exudate or posterior oropharyngeal erythema  Cardiovascular:      Rate and Rhythm: Normal rate and regular rhythm  Pulses: Normal pulses  Heart sounds: Normal heart sounds  No murmur heard  No friction rub  No gallop  Pulmonary:      Effort: No tachypnea, bradypnea, accessory muscle usage or respiratory distress  Breath sounds: Decreased air movement present  No stridor or transmitted upper airway sounds   No decreased breath sounds, wheezing, rhonchi or rales  Comments: Diminished breath sounds  Chest:      Chest wall: No tenderness  Abdominal:      General: Abdomen is flat  Bowel sounds are normal  There is no distension  Palpations: Abdomen is soft  There is no mass  Musculoskeletal:         General: No swelling or tenderness  Normal range of motion  Cervical back: Normal range of motion and neck supple  No rigidity or tenderness  Skin:     General: Skin is warm and dry  Coloration: Skin is not jaundiced or pale  Neurological:      General: No focal deficit present  Mental Status: She is alert and oriented to person, place, and time  Mental status is at baseline     Psychiatric:         Mood and Affect: Mood normal          Behavior: Behavior normal          Lab Results:   I have personally reviewed pertinent lab results CBC:   Lab Results   Component Value Date    WBC 17 47 (H) 12/29/2021    HGB 12 8 12/29/2021    HCT 37 9 12/29/2021    MCV 86 12/29/2021     12/29/2021    MCH 28 9 12/29/2021    MCHC 33 8 12/29/2021    RDW 15 0 12/29/2021    MPV 10 2 12/29/2021    NRBC 2 12/28/2021   , CMP:   Lab Results   Component Value Date    SODIUM 143 12/29/2021    K 3 1 (L) 12/29/2021     (H) 12/29/2021    CO2 16 (L) 12/29/2021    BUN 22 12/29/2021    CREATININE 0 76 12/29/2021    CALCIUM 5 4 (LL) 12/29/2021    AST 17 12/29/2021    ALT 16 12/29/2021    ALKPHOS 89 12/29/2021    EGFR 75 12/29/2021   , PT/INR:   Lab Results   Component Value Date    INR 1 51 (H) 12/29/2021   , Troponin: No results found for: TROPONINI      Imaging Studies: I have personally reviewed pertinent films in PACS     Chest x-ray- 12/28/2021- diffuse ground-glass opacity    EKG, Pathology, and Other Studies: I have personally reviewed pertinent films in PACS     EKG- NSR    Pulmonary Results (PFTs, PSG): I have personally reviewed pertinent films in PACS     No PFTs recorded    VTE Prophylaxis: Sequential compression device (Venodyne)     Code Status: Level 1 - Full Code    None    Portions of the record may have been created with voice recognition software  Occasional wrong word or "sound a like" substitutions may have occurred due to the inherent limitations of voice recognition software  Read the chart carefully and recognize, using context, where substitutions have occurred

## 2021-12-29 NOTE — PROGRESS NOTES
[facility]  Progress Note - Alan Villavicencio [birth year], 67 y o  female MRN: [id number]  Unit/Bed#: [address] [place] [address] 218-01 Encounter: [id number]  Primary Care Provider: Carlos Khan MD   Date and time admitted to hospital: 12/28/2021  5:16 PM    * Acute respiratory failure with hypoxia [facility])  Assessment & Plan  -patient presented with acute respiratory failure with hypoxia secondary to bilateral COVID pneumonia   -patient was initially requiring 15 L mid flow  -she has decompensated and is currently requiring 15 L midflow, and a non-rebreather to maintain saturations 85%  -patient was placed on the COVID treatment protocol, and escalated to severe category of disease  -discussed with patient's daughter if she were to decompensate further she may require transfer to ICU  -confer with the pulmonary/critical care team    Pneumonia due to COVID-19 virus  Assessment & Plan  -patient presented with bilateral pneumonia secondary to COVID-19, unvaccinated  -she initially was in the [facility] category of disease requiring 15 L mid flow, however she deteriorated overnight and is currently requiring 15 L mid flow + non-rebreather to maintain her saturations at approximately 85-87%  -continue COVID treatment protocol in escalate to severe disease category  -Remdesivir day 2/5-->was started while on moderate disease pathway  -decadron day #2--> Day 1 of high dose  -start anticoagulation protocol, d-dimer 5, pt escalated from moderate pathway:  Start heparin infusion  -ceftriaxone/doxycycline unless procal neg x 2  -baricitinib day 1/14  -lipitor 40mg qd  --->249  -consult pulmonary/critical care  -daughter d/w pt importance of prone position via bedside phone    Sepsis [facility])  Assessment & Plan  -patient presented with sepsis, poa with tachypnea/significant leuckocytosis  -patient started on the COVID treatment protocol with remdesivir/dexamethasone  -as she is in the severe disease category she is also on ceftriaxone/doxycycline  -anticipate discontinuing antibiotics if procalcitonin negative x2    Essential hypertension  Assessment & Plan  -patient has history of essential hypertension  -home medications include chlorthalidone 25 mg daily and losartan 100 mg daily  -Blood pressure currently low-normal due to COVID pneumonia, dehydration, and sepsis  -Continue to hold antihypertensives and monitor  -Current blood pressure 112/53    Type 2 diabetes mellitus without complication, without long-term current use of insulin [facility])  Assessment & Plan  Lab Results   Component Value Date    HGBA1C 9 2 (H) 05/26/2021       Recent Labs     12/28/21  2214 12/29/21  0809 12/29/21  1055   POCGLU 230* 191* 210*       Blood Sugar Average: Last 72 hrs:  (P) 681 [id number]   -Patient has history of diabetes type 2, without long-term use of insulin without complication  -previously on empaglifozin and janumet  -Hold oral meds while inpatient  -Accu-Cheks, and sliding scale insulin            Family:  Updated patient's daughter Tristen Francis, with the use of patient's bedside phone  Reviewed all test results, treatment plan, and severe disease category, due to read from last night  Reviewed with her if her mother were to deteriorate further,  she will need transfer to ICU    Discussed with patient's nurse  Discussed with ICU/pulmonary team:  They will see in consultation    VTE Pharmacologic Prophylaxis: Heparin  VTE Mechanical Prophylaxis: sequential compression device        Certification Statement: The patient will continue to require additional inpatient hospital stay due to need for further acute intervention for hypoxia, COVID    Status: inpatient     ===================================================================    Subjective:  Patient is interviewed and examined with the assistance of her daughter Tristen Francis as Cantonese     The  iPad did not have a Cantonese  available either on video or phone request    Patient denies any pain  She notes shortness of breath and cough  She denies any nausea or vomiting  Very dry mouth  Patient's daughter relates that prior to admission patient had minimal p o  Intake, had constipation and decreased urine output  She appeared dehydrated with generalized weakness  Physical Exam:   Temp:  [97 8 °F (36 6 °C)-98 4 °F (36 9 °C)] 98 °F (36 7 °C)  HR:  [84-94] 94  Resp:  [18-28] 18  BP: (106-143)/(52-72) 112/53    Gen:  Pleasant, female, sitting upright in bed with head of the bed at 80°  Appears tachypneic  Makes eye contact  Cooperates with exam  Heart: regular rate and rhythm, S1S2 present, no murmur, rub or gallop  Lungs:  Decreased breath sounds in both bases, however adequate breath sounds in mid and upper lung field  No wheezes, crackles, rhonchi    No accessory muscle use or respiratory distress  Abd: soft, non-tender, non-distended  NABS, no guarding, rebound or peritoneal signs  Extremities: no clubbing, cyanosis  2+ pedal edema  1+ pretibial edema     2+pedal pulses bilaterally  Neuro: awake, alert  Interactive  Makes eye contact  Cooperates with exam   Skin: warm and dry: no petechiae, purpura and rash  LABS:   Results from last 7 days   Lab Units 12/29/21  0637 12/28/21  1807   WBC Thousand/uL 17 47* 21 43*   HEMOGLOBIN g/dL 12 8 15 3   HEMATOCRIT % 37 9 44 0   PLATELETS Thousands/uL 158 191     Results from last 7 days   Lab Units 12/29/21  0637 12/28/21  2120   POTASSIUM mmol/L 3 1* 4 0   CHLORIDE mmol/L 109* 89*   CO2 mmol/L 16* 24   BUN mg/dL 22 33*   CREATININE mg/dL 0 76 1 32*   CALCIUM mg/dL 5 4* [facility]:    12/28:  Chest x-ray:  Diffuse groundglass opacities are suspicious for Covid 19 pneumonia    Pulmonary edema would be less likely    Microbiology  12/28 blood culture:  Pending x2  12/20:  Positive COVID      ---------------------------------------------------------------------------------------------------------------  This note has been constructed using a voice recognition system

## 2021-12-29 NOTE — ASSESSMENT & PLAN NOTE
Lab Results   Component Value Date    HGBA1C 9 2 (H) 05/26/2021       No results for input(s): POCGLU in the last 72 hours      Blood Sugar Average: Last 72 hrs:   previously on empaglifozin and janumet  -Hold oral meds given risk for yari  -start ssi/poc bs while on dexamethasone

## 2021-12-29 NOTE — ASSESSMENT & PLAN NOTE
-patient presented with bilateral pneumonia secondary to COVID-19, unvaccinated  -she initially was in the [facility] category of disease requiring 15 L mid flow, however she deteriorated overnight and is currently requiring 15 L mid flow + non-rebreather to maintain her saturations at approximately 85-87%  -continue COVID treatment protocol in escalate to severe disease category  -Remdesivir day 2/5-->was started while on moderate disease pathway  -decadron day #2--> Day 1 of high dose  -start anticoagulation protocol, d-dimer 5, pt escalated from moderate pathway:  Start heparin infusion  -ceftriaxone/doxycycline unless procal neg x 2  -baricitinib day 1/14  -lipitor 40mg qd  --->249  -consult pulmonary/critical care  -daughter d/w pt importance of prone position via bedside phone

## 2021-12-30 NOTE — PROGRESS NOTES
Date of admission:  12/28/2021 [facility] day number 1       OVERNIGHT/24 Hrs EVENTS:  Upgraded this afternoon, maximized on HFNC 100%    BRIEF REVIEW OF SYSTEM:  Awake, feeling tired  Vitals:  Temp:  [97 1 °F (36 2 °C)-98 1 °F (36 7 °C)] 97 1 °F (36 2 °C)  HR:  [74-94] 74  Resp:  [17-27] 17  BP: ()/(37-72) 75/37  FiO2 (%):  [100] 100    Intake/Output:    Intake/Output Summary (Last 24 hours) at 12/29/2021 1922  Last data filed at 12/29/2021 1900  Gross per 24 hour   Intake 2102 36 ml   Output --   Net 2102 36 ml        Weight:  Weight (last 2 days)     Date/Time Weight    12/29/21 1403 60 6 (133 6)    12/28/21 1718 61 7 (136 02)           Respiratory  Report   Lab Data (Last 4 hours)    None         O2/Vent Data (Last 4 hours)    None                    On exam,  Gen: not in acute distress, Body mass index is 26 53 kg/m²  HEENT: supple, no adenopathy, PERRL  Chest:  Mild increased work of breathing, diffuse fine crackles posteriorly  CV: RRR, no murmurs appreciated  Abdomen: soft, non tender  Extremities: no edema  Neuro:  Nonfocal      Labs: I have personally reviewed pertinent lab results    Results from last 7 days   Lab Units 12/29/21  0637 12/28/21  1807   WBC Thousand/uL 17 47* 21 43*   HEMOGLOBIN g/dL 12 8 15 3   HEMATOCRIT % 37 9 44 0   PLATELETS Thousands/uL 158 191   MONO PCT %  --  1*   BANDS PCT %  --  2   EOS PCT %  --  0      Results from last 7 days   Lab Units 12/29/21  0637 12/28/21  2120   SODIUM mmol/L 143 130*   POTASSIUM mmol/L 3 1* 4 0   CHLORIDE mmol/L 109* 89*   CO2 mmol/L 16* 24   BUN mg/dL 22 33*   CREATININE mg/dL 0 76 1 32*   CALCIUM mg/dL 5 4* 7 6*   ALK PHOS U/L 89 146*   ALT U/L 16 26   AST U/L 17 28     Results from last 7 days   Lab Units 12/29/21  1213 12/29/21  1052   INR  1 25* 1 51*     Results from last 7 days   Lab Units 12/29/21  0637   LACTIC ACID mmol/L 1 5         MICRO  Results from last 7 days   Lab Units 12/28/21  1807 12/28/21  1806   BLOOD CULTURE  Received in Microbiology Lab  Culture in Progress  Received in Microbiology Lab  Culture in Progress  Imaging/Radiology, Pathology  I have personally reviewed pertinent reports and reviewed pertinent films in PACS      Patient Active Problem List   Diagnosis    Type 2 diabetes mellitus without complication, without long-term current use of insulin ([place] [address] )    Mixed hyperlipidemia    Essential hypertension    Viral upper respiratory tract infection    Osteoarthritis of both knees    Gastroesophageal reflux disease without esophagitis    Peripheral vascular disease, unspecified ([place] [address] )    Sepsis ([place] [address] )    Acute respiratory failure with hypoxia ([place] [address] )    Pneumonia due to COVID-19 virus         Impression:   Acute hypoxemic respiratory failure    COVID-19 pneumonia-positive PCR 12/20, non vaccinated   HTN    Reflux    DM2    Peripheral vascular disease        Plan:     Treatment per the severe protocol   o Baricitinib, remdesivir   o High-dose steroids 0 1 milligram/kg b i d   o Doxycycline/ceftriaxone, trend procalcitonin  o Unfractionated heparin, D-dimer>10   Wean FiO2 for SpO2 above 88%    PTA ASA/statin   Melatonin HS/lidocaine patch for chronic pain   Appreciate palliative care inputs    Devices: Invasive Devices  Report    Peripheral Intravenous Line            Peripheral IV 12/28/21 Distal;Dorsal (posterior); Right Wrist <1 day    Peripheral IV 12/29/21 Left;Upper;Ventral (anterior) Arm <1 day                Disposition:   Transfer to Critical Care       I have personally seen and examined the patient on 12/29/2021  I discussed the patient with the AP /House staff including, but not limited to, verifying findings; reviewing labs and imaging; discussing with consultants; developing the plan of care with the bedside nurse; and discussing treatment plan with patient or surrogate    I have reviewed the note and assessment performed by the AP /House staff, and agree with the documented findings and plan of care with the following additions/exceptions  Please see my following comments for details and adjustments  Critical care time, excluding procedures, teaching, family meetings, and excludes any prior time recorded by providers other than myself, 35 minutes  Carole Pantoja MD      Portions of the record may have been created with voice recognition software  Occasional wrong word or "sound a like" substitutions may have occurred due to the inherent limitations of voice recognition software    Read the chart carefully and recognize, using context, where substitutions have occurred

## 2021-12-30 NOTE — UTILIZATION REVIEW
Inpatient Admission Authorization Request   NOTIFICATION OF INPATIENT ADMISSION/INPATIENT AUTHORIZATION REQUEST   SERVICING FACILITY:   [address] [place] [address], [facility], [facility]  Tax ID: [id number]  NPI: [id number]  Place of Service: Inpatient [address] [facility]  [address]  Place of Service Code: 24     ATTENDING PROVIDER:  Attending Name and NPI#: Javier Munguiama [[id number]]  Address: [address] [place] [address], [facility], [facility]  Phone: [phone]     UTILIZATION REVIEW CONTACT:  Chetna Brown, Utilization   Network Utilization Review Department  Phone: [phone]  Fax: [phone]  Email: [email or web address]     PHYSICIAN ADVISORY SERVICES:  FOR EAKT-NQ-SNYC REVIEW - MEDICAL NECESSITY DENIAL  Phone: [phone]  Fax: [phone]  Email: [email or web address]     TYPE OF REQUEST:  Inpatient Status     ADMISSION INFORMATION:  ADMISSION DATE/TIME: 12/28/21  8:03 PM  PATIENT DIAGNOSIS CODE/DESCRIPTION:  Oral candidiasis [B37 0]  SOB (shortness of breath) [R06 02]  Acute respiratory failure with hypoxia (HCC) [J96 01]  Pneumonia due to COVID-19 virus [U07 1, J12 82]  DISCHARGE DATE/TIME: No discharge date for patient encounter  DISCHARGE DISPOSITION (IF DISCHARGED): Final discharge disposition not confirmed     IMPORTANT INFORMATION:  Please contact the Chetna Brown directly with any questions or concerns regarding this request  Department voicemails are confidential     Send requests for admission clinical reviews, concurrent reviews, approvals, and administrative denials due to lack of clinical to fax [phone]

## 2021-12-30 NOTE — PROGRESS NOTES
[address] [facility]  Progress Note - Bary Felty [birth year], 67 y o  female MRN: [id number]  Unit/Bed#: ICU 07 Encounter: [id number]  Primary Care Provider: Kendra Galdamez MD   Date and time admitted to hospital: 12/28/2021  5:16 PM    Pneumonia due to COVID-19 virus  Assessment & Plan  Patient presented with bilateral pneumonia secondary to COVID-19, unvaccinated  Initially was in the [facility] category of disease requiring 15 L mid flow  Continue COVID treatment protocol in escalate to severe disease category  Remdesivir day 3/5-->was started while on moderate disease pathway  Decadron day #3--> Day 2 of high dose  Continue Heparin infusion  Continue Ceftriaxone/Doxycycline   Baricitinib day 2/14  Lipitor 40mg qd  -->249-->252 5  Daughter d/w pt importance of prone position via bedside phone    Sepsis [facility])  Assessment & Plan  Patient presented with sepsis, poa with tachypnea/significant leuckocytosis  Started on the COVID treatment protocol with remdesivir/dexamethasone  As she is in the [place] disease category she is also on ceftriaxone/doxycycline  Anticipate discontinuing antibiotics when procalcitonin negative x2  Encourage incentive spirometry  Airway clearance protocol    Essential hypertension  Assessment & Plan  Home meds include Losartan and Chlorthalidone  Home meds currently on hold due to soft BPs    Type 2 diabetes mellitus without complication, without long-term current use of insulin [facility])  Assessment & Plan  Lab Results   Component Value Date    HGBA1C 9 2 (H) 05/26/2021       Recent Labs     12/29/21  0809 12/29/21  1055 12/29/21  1615 12/29/21  2103   POCGLU 191* 210* 232* 297*       Blood Sugar Average: Last 72 hrs:  (P) 232   Currently on SSI    Continue accuchecks  Avoid hypoglycemia       * Acute respiratory failure with hypoxia [facility])  Assessment & Plan  Patient presented with acute respiratory failure with hypoxia secondary to bilateral COVID pneumonia   Initially requiring 15 L mid flow  Patient was placed on the COVID treatment protocol, and escalated to [facility] category of disease  Currently on HFNC        ----------------------------------------------------------------------------------------  HPI/24hr events: No acute overnight events  Patient received 500ccs of IVF due to low BP  Currently on HFNC at 60L, FiO2 100%  Patient appropriate for transfer out of the ICU today?: No  Disposition: Continue Critical Care   Code Status: Level 1 - Full Code  ---------------------------------------------------------------------------------------  SUBJECTIVE  Patient reports that she is uncomfortable today after using the [address] and urinating on the bed  She states that since she cannot get out of bed, she would like a catheter  She also endorses a new onset nonproductive cough that started overnight  States that she feels febrile, however, denies chills  Rest of  ROS as below  Review of Systems   Constitutional: Positive for fever  Negative for appetite change, chills and diaphoresis  HENT: Negative for congestion, postnasal drip, rhinorrhea, sore throat and trouble swallowing  Respiratory: Positive for cough  Negative for choking, chest tightness, shortness of breath and wheezing  Cardiovascular: Negative for chest pain, palpitations and leg swelling  Gastrointestinal: Negative for constipation and diarrhea  Genitourinary: Positive for urgency  Negative for difficulty urinating and dysuria  Neurological: Positive for syncope (Patient states that she feels like she might have passed out during the night)  Negative for weakness, light-headedness and numbness       Review of systems was reviewed and negative unless stated above in HPI/24-hour events   ---------------------------------------------------------------------------------------  OBJECTIVE    Vitals   Vitals:    12/30/21 0400 12/30/21 0432 12/30/21 0500 12/30/21 0600   BP: 106/50  114/50 138/67   BP Location: Left arm      Pulse: 76  76 86   Resp: 20  18 (!) 26   Temp:       TempSrc:       SpO2: (!) 89% 90% 92% 90%   Weight:       Height:         Temp (24hrs), Av 4 °F (36 3 °C), Min:96 5 °F (35 8 °C), Max:98 1 °F (36 7 °C)  Current: Temperature: (!) 97 1 °F (36 2 °C)          Respiratory:  SpO2: SpO2: 90 %       Invasive/non-invasive ventilation settings   Respiratory  Report   Lab Data (Last 4 hours)    None         O2/Vent Data (Last 4 hours)    None                Physical Exam  Vitals and nursing note reviewed  Constitutional:       General: She is not in acute distress  Appearance: She is well-developed  HENT:      Head: Normocephalic and atraumatic  Eyes:      Extraocular Movements: Extraocular movements intact  Conjunctiva/sclera: Conjunctivae normal    Cardiovascular:      Rate and Rhythm: Normal rate and regular rhythm  Pulses: Normal pulses  Heart sounds: Normal heart sounds  No murmur heard  Pulmonary:      Effort: Pulmonary effort is normal  No respiratory distress  Breath sounds: Decreased breath sounds present  No wheezing, rhonchi or rales  Chest:      Chest wall: No tenderness  Abdominal:      General: Bowel sounds are normal       Palpations: Abdomen is soft  Tenderness: There is no abdominal tenderness  There is no guarding  Musculoskeletal:      Cervical back: Normal range of motion and neck supple  Right lower leg: No tenderness  No edema  Left lower leg: No tenderness  No edema  Comments: Trace pitting edema noted in left upper extremity  Skin:     General: Skin is warm and dry  Neurological:      Mental Status: She is alert and oriented to person, place, and time     Psychiatric:         Mood and Affect: Mood normal          Behavior: Behavior normal              Laboratory and Diagnostics:  Results from last 7 days   Lab Units 21  0428 21  0637 21  1807   WBC Thousand/uL 17 45* 17 47* 21 43*   HEMOGLOBIN g/dL 12 3 12 8 15 3   HEMATOCRIT % 36 6 37 9 44 0   PLATELETS Thousands/uL 154 158 191   BANDS PCT %  --   --  2   MONO PCT %  --   --  1*     Results from last 7 days   Lab Units 12/30/21  0428 12/29/21  0637 12/28/21  2120   SODIUM mmol/L 141 143 130*   POTASSIUM mmol/L 4 1 3 1* 4 0   CHLORIDE mmol/L 102 109* 89*   CO2 mmol/L 24 16* 24   ANION GAP mmol/L 15* 18* 17*   BUN mg/dL 36* 22 33*   CREATININE mg/dL 1 13 0 76 1 32*   CALCIUM mg/dL 7 5* 5 4* 7 6*   GLUCOSE RANDOM mg/dL 239* 143* 216*   ALT U/L 24 16 26   AST U/L 24 17 28   ALK PHOS U/L 131* 89 146*   ALBUMIN g/dL 1 5* 1 1* 2 0*   TOTAL BILIRUBIN mg/dL 0 43 0 37 0 71          Results from last 7 days   Lab Units 12/30/21  0131 12/29/21  1930 12/29/21  1213 12/29/21  1052   INR   --   --  1 25* 1 51*   PTT seconds 193* 59* 130* >210*          Results from last 7 days   Lab Units 12/29/21  0637 12/29/21  0013 12/28/21  2359 12/28/21  2120   LACTIC ACID mmol/L 1 5 2 0 2 0 3 3*     ABG:    VBG:    Results from last 7 days   Lab Units 12/29/21  1213 12/29/21  0637 12/28/21  1807   PROCALCITONIN ng/ml 0 89* 0 70* 0 98*       Micro  Results from last 7 days   Lab Units 12/28/21  1807 12/28/21  1806   BLOOD CULTURE  No Growth at 24 hrs  No Growth at 24 hrs  EKG: Sinus rhythm with HR in the 70s  Imaging: I have personally reviewed pertinent reports  and I have personally reviewed pertinent films in PACS    Intake and Output  I/O       12/28 0701 12/29 0700 12/29 0701 12/30 0700    P  O  960     I V  (mL/kg) 300 (4 9) 602 1 (9 9)    IV Piggyback 2050 320    Total Intake(mL/kg) 3310 (53 6) 922 1 (15 2)    Urine (mL/kg/hr)  1000 (0 7)    Total Output  1000    Net +3310 -77 9          Unmeasured Urine Occurrence 1 x           Height and Weights   Height: 4' 11 5" (151 1 cm)     Body mass index is 26 53 kg/m²    Weight (last 2 days)     Date/Time Weight    12/29/21 1403 60 6 (133 6)    12/28/21 1718 61 7 (136 02)            Nutrition       Diet Orders   (From admission, onward)             Start     Ordered    12/28/21 2222  Diet Dysphagia/Modified Consistency; Dysphagia 2-Mechanical Soft; Thin Liquid  Diet effective now        References:    Nutrtion Support Algorithm Enteral vs  Parenteral   Question Answer Comment   Diet Type Dysphagia/Modified Consistency    Dysphagia/Modified Consistency Dysphagia 2-Mechanical Soft    Liquid Modifier Thin Liquid    RD to adjust diet per protocol?  Yes        12/28/21 2222                  Active Medications  Scheduled Meds:  Current Facility-Administered Medications   Medication Dose Route Frequency Provider Last Rate    acetaminophen  650 mg Oral Q6H PRN Larry Sanchez MD      aspirin  81 mg Oral Daily Larry Sanchez MD      atorvastatin  40 mg Oral Daily With Lars Tinoco MD      Baricitinib  4 mg Oral Q24H Larry Sanchez MD      calcium carbonate  500 mg Oral BID PRN Larry Sanchez MD      cefTRIAXone  1,000 mg Intravenous Q24H Larry Sanchez MD Stopped (12/29/21 1815)    dexamethasone  0 1 mg/kg Intravenous Q12H Larry Sanchez MD      doxycycline hyclate  100 mg Oral Q12H Larry Sanchez MD      famotidine  20 mg Oral Daily Larry Sanchez MD      heparin (porcine)  3-20 Units/kg/hr (Order-Specific) Intravenous Titrated Larry Sanchez MD 8 Units/kg/hr (12/30/21 0339)    heparin (porcine)  1,800 Units Intravenous Q1H PRN Larry Sanchez MD      heparin (porcine)  3,600 Units Intravenous Q1H PRN Larry Sanchez MD      insulin lispro  1-5 Units Subcutaneous 4x Daily (AC & HS) Larry Sanchez MD      lidocaine  1 patch Topical Daily Larry Sanchez MD      melatonin  6 mg Oral HS Larry Sanchez MD      ondansetron  4 mg Intravenous Q6H PRN Larry Sanchez MD      remdesivir  100 mg Intravenous Q24H Larry Sanchez MD       Continuous Infusions:  heparin (porcine), 3-20 Units/kg/hr (Order-Specific), Last Rate: 8 Units/kg/hr (12/30/21 0339)      PRN Meds:   acetaminophen, 650 mg, Q6H PRN  calcium carbonate, 500 mg, BID PRN  heparin (porcine), 1,800 Units, Q1H PRN  heparin (porcine), 3,600 Units, Q1H PRN  ondansetron, 4 mg, Q6H PRN        Invasive Devices Review  Invasive Devices  Report    Peripheral Intravenous Line            Peripheral IV 12/28/21 Distal;Dorsal (posterior); Right Wrist 1 day    Peripheral IV 12/29/21 Left;Upper;Ventral (anterior) Arm <1 day          Drain            External Urinary Catheter <1 day                Rationale for remaining devices: Severity of illness  ---------------------------------------------------------------------------------------  Advance Directive and Living Will:      Power of :    POLST:    ---------------------------------------------------------------------------------------  Care Time Delivered:   No Critical Care time spent       Juwan Jean-Baptiste MD      Portions of the record may have been created with voice recognition software  Occasional wrong word or "sound a like" substitutions may have occurred due to the inherent limitations of voice recognition software    Read the chart carefully and recognize, using context, where substitutions have occurred

## 2021-12-30 NOTE — QUICK NOTE
Reached out to patient's daughter [facility], spoke to granddaughter Nereida Curry on the phone who translated between [facility] and myself  Updated [facility] and Nereida Curry of the plan today and answered all questions  At this time, patient's family would like to continue all measures, including intubation and CPR

## 2021-12-30 NOTE — OCCUPATIONAL THERAPY NOTE
Occupational Therapy Evaluation(fqah=[id number])     Patient Name: Lakshmi Fitzpatrick  TLLQJ'H Date: 12/30/2021  Problem List  Principal Problem:    Acute respiratory failure with hypoxia [facility])  Active Problems:    Type 2 diabetes mellitus without complication, without long-term current use of insulin ([facility] [address] )    Essential hypertension    [facility]    Pneumonia due to COVID-19 virus    Past Medical History  History reviewed  No pertinent past medical history  Past Surgical History  History reviewed  No pertinent surgical history  12/30/21 1230   Note Type   Note type Evaluation   Restrictions/Precautions   Weight Bearing Precautions Per Order No   Other Precautions Contact/isolation; Airborne/isolation; Fall Risk; Chair Alarm; Bed Alarm  (Cantonese speaking only)   Pain Assessment   Pain Assessment Tool 0-10   Pain Score No Pain   Home Living   Type of Home House   Home Layout Multi-level;Bed/bath upstairs  (1 angelita; 30+ steps to bedroom)   Home Equipment   (denies)   Prior Function   Lives With Daughter   ADL Assistance Independent   Falls in the last 6 months 0   Comments Per granddtr, PTA pt was independent with all aspects of her ADLs, transfers, ambulation--w/o device; neg falls, +home alone, neg    Lifestyle   Autonomy Per granddtr, PTA pt was independent with all aspects of her ADLs, transfers, ambulation--w/o device; neg falls, +home alone, neg    Reciprocal Relationships supportive family   Service to Others homemaker   Intrinsic Gratification spending time with family   Psychosocial   Psychosocial (WDL) WDL   Subjective   Subjective "When I drink cold water, I cough "   ADL   Where Assessed Edge of bed   Eating Assistance 6  Modified independent   Grooming Assistance 6  Modified Independent   UB Bathing Assistance 5  Supervision/Setup   LB Bathing Assistance 4  Minimal Assistance   UB Dressing Assistance 5  Supervision/Setup   LB Dressing Assistance 4  Minimal Assistance   Bed Mobility Rolling R 5  Supervision   Rolling L 5  Supervision   Supine to Sit 4  Minimal assistance   Additional items Assist x 1; Increased time required;Verbal cues;LE management   Transfers   Sit to Stand 4  Minimal assistance   Additional items Assist x 1; Increased time required;Verbal cues   Stand to Sit 4  Minimal assistance   Additional items Assist x 1; Increased time required;Verbal cues   Functional Mobility   Functional Mobility 4  Minimal assistance   Additional Comments x1   Additional items Hand hold assistance   Balance   Static Sitting Good   Dynamic Sitting Fair +   Static Standing Fair   Dynamic Standing Fair -   Activity Tolerance   Activity Tolerance Patient limited by fatigue   Medical Staff Made Aware nsg   RUE Assessment   RUE Assessment WFL   RUE Strength   RUE Overall Strength Within Functional Limits - able to perform ADL tasks with strength  (3+/5 throughout)   LUE Assessment   LUE Assessment WFL   LUE Strength   LUE Overall Strength Within Functional Limits - able to perform ADL tasks with strength  (3+/5 throughout)   Hand Function   Gross Motor Coordination Functional   Fine Motor Coordination Functional   Sensation   Light Touch No apparent deficits   Vision - Complex Assessment   Visual Fields   (able to scan visual fields)   Perception   Inattention/Neglect Appears intact   Cognition   Overall Cognitive Status WFL   Arousal/Participation Alert   Attention Within functional limits   Orientation Level Oriented to person;Oriented to place;Oriented to time;Oriented to situation  (no name of hospital )   Memory Within functional limits   Following Commands Follows one step commands with increased time or repetition   Comments family states no hx of cognitive issues; cyracom line used   Assessment   Limitation Decreased ADL status; Decreased UE strength;Decreased Safe judgement during ADL;Decreased endurance;Decreased high-level ADLs   Prognosis Good   Assessment Pt is a 70y/o female admitted to the hospital 2* symptoms of SOB, cough, decreased PO intake  Pt noted with acute respiratory failure 2* COVID-19/PNA  PMH=neg  Per granddtr, PTA pt was independent with all aspects of her ADLs, transfers, ambulation--w/o device; neg falls, +home alone, neg   During initial eval, pt demonstrated deficits with her functional balance, functional mobility, ADL status, transfer safety, b/l UE strength, and activity tolerance(currently fair=15-20mins)  Pt would benefit from continued OT tx for the above deficits  3-5xwk/1-2wks  Goals   Patient Goals "to get better"   STG Time Frame   (1-7 days)   Short Term Goal #1 Pt will demonstrate improved activity tolerance to good(20-30mins) and standing tolerance to 3-5mins to assist with ADLs  Short Term Goal #2 Pt will demonstrate mod I with their bed mobility to facilitate EOB ADLs  Short Term Goal  Pt will demonstrate proper walker/transfer safety 100% of the time  LTG Time Frame   (7-14 days)   Long Term Goal #1 Pt will demonstrate g/g- balance with all functional activities  Long Term Goal #2 Pt will demonstrate mod I with their UE and LE bathing/dresssing  Long Term Goal Pt will independently demonstrate knowledge and application of proper energy conservation techniques 100% of the time  Plan   Treatment Interventions ADL retraining;Functional transfer training;UE strengthening/ROM; Endurance training;Patient/family training;Equipment evaluation/education; Compensatory technique education   Goal Expiration Date 01/13/22   Recommendation   OT Discharge Recommendation   (str vs home pending progress)   AM-PAC Daily Activity Inpatient   Lower Body Dressing 3   Bathing 3   Toileting 3   Upper Body Dressing 3   Grooming 4   Eating 4   Daily Activity Raw Score 20   Daily Activity Standardized Score (Calc for Raw Score >=11) 42 03   AM-PAC Applied Cognition Inpatient   Following a Speech/Presentation 4   Understanding Ordinary Conversation 4   Taking Medications 3   Remembering Where Things Are Placed or Put Away 3   Remembering List of 4-5 Errands 3   Taking Care of Complicated Tasks 3   Applied Cognition Raw Score 20   Applied Cognition Standardized Score 41 76   Adan Simpson, OT

## 2021-12-30 NOTE — ASSESSMENT & PLAN NOTE
Lab Results   Component Value Date    HGBA1C 9 2 (H) 05/26/2021       Recent Labs     12/29/21  0809 12/29/21  1055 12/29/21  1615 12/29/21  2103   POCGLU 191* 210* 232* 297*       Blood Sugar Average: Last 72 hrs:  (P) 232   Currently on SSI    Continue accuchecks  Avoid hypoglycemia

## 2021-12-30 NOTE — PLAN OF CARE
Problem: Potential for Falls  Goal: Patient will remain free of falls  Description: INTERVENTIONS:  - Educate patient/family on patient safety including physical limitations  - Instruct patient to call for assistance with activity   - Consult OT/PT to assist with strengthening/mobility   - Keep Call bell within reach  - Keep bed low and locked with side rails adjusted as appropriate  - Keep care items and personal belongings within reach  - Initiate and maintain comfort rounds  - Make Fall Risk Sign visible to staff  - Offer Toileting every 2 Hours, in advance of need  - Initiate/Maintain bed alarm  - Obtain necessary fall risk management equipment: yellow socks  Problem: PAIN - ADULT  Goal: Verbalizes/displays adequate comfort level or baseline comfort level  Description: Interventions:  - Encourage patient to monitor pain and request assistance  - Assess pain using appropriate pain scale  - Administer analgesics based on type and severity of pain and evaluate response  - Implement non-pharmacological measures as appropriate and evaluate response  - Consider cultural and social influences on pain and pain management  - Notify physician/advanced practitioner if interventions unsuccessful or patient reports new pain  Outcome: Progressing     Problem: INFECTION - ADULT  Goal: Absence or prevention of progression during hospitalization  Description: INTERVENTIONS:  - Assess and monitor for signs and symptoms of infection  - Monitor lab/diagnostic results  - Monitor all insertion sites, i e  indwelling lines, tubes, and drains  - Monitor endotracheal if appropriate and nasal secretions for changes in amount and color  - [place] appropriate cooling/warming therapies per order  - Administer medications as ordered  - Instruct and encourage patient and family to use good hand hygiene technique  - Identify and instruct in appropriate isolation precautions for identified infection/condition  Outcome: Progressing  Goal: Absence of fever/infection during neutropenic period  Description: INTERVENTIONS:  - Monitor WBC    Outcome: Progressing     Problem: DISCHARGE PLANNING  Goal: Discharge to home or other facility with appropriate resources  Description: INTERVENTIONS:  - Identify barriers to discharge w/patient and caregiver  - Arrange for needed discharge resources and transportation as appropriate  - Identify discharge learning needs (meds, wound care, etc )  - Arrange for interpretive services to assist at discharge as needed  - Refer to Case Management Department for coordinating discharge planning if the patient needs post-hospital services based on physician/advanced practitioner order or complex needs related to functional status, cognitive ability, or social support system  Outcome: Progressing     Problem: Knowledge Deficit  Goal: Patient/family/caregiver demonstrates understanding of disease process, treatment plan, medications, and discharge instructions  Description: Complete learning assessment and assess knowledge base    Interventions:  - Provide teaching at level of understanding  - Provide teaching via preferred learning methods  Outcome: Progressing     - Apply yellow socks and bracelet for high fall risk patients  - Consider moving patient to room near nurses station  Outcome: Progressing

## 2021-12-30 NOTE — ASSESSMENT & PLAN NOTE
Patient presented with bilateral pneumonia secondary to COVID-19, unvaccinated  Initially was in the [facility] category of disease requiring 15 L mid flow  Continue COVID treatment protocol in escalate to severe disease category  Remdesivir day 3/5-->was started while on moderate disease pathway  Decadron day #3--> Day 2 of high dose  Continue Heparin infusion  Continue Ceftriaxone/Doxycycline   Baricitinib day 2/14  Lipitor 40mg qd  -->249-->252 5  Daughter d/w pt importance of prone position via bedside phone

## 2021-12-30 NOTE — ASSESSMENT & PLAN NOTE
Patient presented with acute respiratory failure with hypoxia secondary to bilateral COVID pneumonia   Initially requiring 15 L mid flow  Patient was placed on the COVID treatment protocol, and escalated to [facility] category of disease  Currently on HFNC

## 2021-12-30 NOTE — PLAN OF CARE
Problem: MOBILITY - ADULT  Goal: Maintain or return to baseline ADL function  Description: INTERVENTIONS:  -  Assess patient's ability to carry out ADLs; assess patient's baseline for ADL function and identify physical deficits which impact ability to perform ADLs (bathing, care of mouth/teeth, toileting, grooming, dressing, etc )  - Assess/evaluate cause of self-care deficits   - Assess range of motion  - Assess patient's mobility; develop plan if impaired  - Assess patient's need for assistive devices and provide as appropriate  - Encourage maximum independence but intervene and supervise when necessary  - Involve family in performance of ADLs  - Assess for home care needs following discharge   - Consider OT consult to assist with ADL evaluation and planning for discharge  - Provide patient education as appropriate  Outcome: Progressing  Goal: Maintains/Returns to pre admission functional level  Description: INTERVENTIONS:  - Perform BMAT or MOVE assessment daily    - Set and communicate daily mobility goal to care team and patient/family/caregiver     - Collaborate with rehabilitation services on mobility goals if consulted    - Out of bed for toileting  - Record patient progress and toleration of activity level   Outcome: Progressing     Problem: Potential for Falls  Goal: Patient will remain free of falls  Description: INTERVENTIONS:  - Educate patient/family on patient safety including physical limitations  - Instruct patient to call for assistance with activity   - Consult OT/PT to assist with strengthening/mobility   - Keep Call bell within reach  - Keep bed low and locked with side rails adjusted as appropriate  - Keep care items and personal belongings within reach  - Initiate and maintain comfort rounds  - Make Fall Risk Sign visible to staff    - Apply yellow socks and bracelet for high fall risk patients  - Consider moving patient to room near nurses station  Outcome: Progressing     Problem: PAIN - ADULT  Goal: Verbalizes/displays adequate comfort level or baseline comfort level  Description: Interventions:  - Encourage patient to monitor pain and request assistance  - Assess pain using appropriate pain scale  - Administer analgesics based on type and severity of pain and evaluate response  - Implement non-pharmacological measures as appropriate and evaluate response  - Consider cultural and social influences on pain and pain management  - Notify physician/advanced practitioner if interventions unsuccessful or patient reports new pain  Outcome: Progressing     Problem: INFECTION - ADULT  Goal: Absence or prevention of progression during hospitalization  Description: INTERVENTIONS:  - Assess and monitor for signs and symptoms of infection  - Monitor lab/diagnostic results  - Monitor all insertion sites, i e  indwelling lines, tubes, and drains  - Monitor endotracheal if appropriate and nasal secretions for changes in amount and color  - [place] appropriate cooling/warming therapies per order  - Administer medications as ordered  - Instruct and encourage patient and family to use good hand hygiene technique  - Identify and instruct in appropriate isolation precautions for identified infection/condition  Outcome: Progressing  Goal: Absence of fever/infection during neutropenic period  Description: INTERVENTIONS:  - Monitor WBC    Outcome: Progressing     Problem: SAFETY ADULT  Goal: Maintain or return to baseline ADL function  Description: INTERVENTIONS:  -  Assess patient's ability to carry out ADLs; assess patient's baseline for ADL function and identify physical deficits which impact ability to perform ADLs (bathing, care of mouth/teeth, toileting, grooming, dressing, etc )  - Assess/evaluate cause of self-care deficits   - Assess range of motion  - Assess patient's mobility; develop plan if impaired  - Assess patient's need for assistive devices and provide as appropriate  - Encourage maximum independence but intervene and supervise when necessary  - Involve family in performance of ADLs  - Assess for home care needs following discharge   - Consider OT consult to assist with ADL evaluation and planning for discharge  - Provide patient education as appropriate  Outcome: Progressing  Goal: Maintains/Returns to pre admission functional level  Description: INTERVENTIONS:  - Perform BMAT or MOVE assessment daily    - Set and communicate daily mobility goal to care team and patient/family/caregiver     - Collaborate with rehabilitation services on mobility goals if consulted    - Out of bed for toileting  - Record patient progress and toleration of activity level   Outcome: Progressing  Goal: Patient will remain free of falls  Description: INTERVENTIONS:  - Educate patient/family on patient safety including physical limitations  - Instruct patient to call for assistance with activity   - Consult OT/PT to assist with strengthening/mobility   - Keep Call bell within reach  - Keep bed low and locked with side rails adjusted as appropriate  - Keep care items and personal belongings within reach  - Initiate and maintain comfort rounds  - Make Fall Risk Sign visible to staff    - Apply yellow socks and bracelet for high fall risk patients  - Consider moving patient to room near nurses station  Outcome: Progressing     Problem: DISCHARGE PLANNING  Goal: Discharge to home or other facility with appropriate resources  Description: INTERVENTIONS:  - Identify barriers to discharge w/patient and caregiver  - Arrange for needed discharge resources and transportation as appropriate  - Identify discharge learning needs (meds, wound care, etc )  - Arrange for interpretive services to assist at discharge as needed  - Refer to Case Management Department for coordinating discharge planning if the patient needs post-hospital services based on physician/advanced practitioner order or complex needs related to functional status, cognitive ability, or social support system  Outcome: Progressing     Problem: Knowledge Deficit  Goal: Patient/family/caregiver demonstrates understanding of disease process, treatment plan, medications, and discharge instructions  Description: Complete learning assessment and assess knowledge base    Interventions:  - Provide teaching at level of understanding  - Provide teaching via preferred learning methods  Outcome: Progressing     Problem: Prexisting or High Potential for Compromised Skin Integrity  Goal: Skin integrity is maintained or improved  Description: INTERVENTIONS:  - Identify patients at risk for skin breakdown  - Assess and monitor skin integrity  - Assess and monitor nutrition and hydration status  - Monitor labs   - Assess for incontinence   - Turn and reposition patient  - Assist with mobility/ambulation  - Relieve pressure over bony prominences  - Avoid friction and shearing  - Provide appropriate hygiene as needed including keeping skin clean and dry  - Evaluate need for skin moisturizer/barrier cream  - Collaborate with interdisciplinary team   - Patient/family teaching  - Consider wound care consult   Outcome: Progressing

## 2021-12-30 NOTE — ASSESSMENT & PLAN NOTE
I suspect he's awaiting insurance coverage costs for his upcoming studies prior to consult with Electrophysiology: Dr. Adalberto Salas    Tried calling patient, spoke to wife    Patient will call back     I'm recommending at least consultation with Electrophysiology: Dr. Adalberto Salas     Patient presented with sepsis, poa with tachypnea/significant leuckocytosis  Started on the COVID treatment protocol with remdesivir/dexamethasone  As she is in the [place] disease category she is also on ceftriaxone/doxycycline  Anticipate discontinuing antibiotics when procalcitonin negative x2  Encourage incentive spirometry    Airway clearance protocol

## 2021-12-30 NOTE — PLAN OF CARE
Problem: OCCUPATIONAL THERAPY ADULT  Goal: Performs self-care activities at highest level of function for planned discharge setting  See evaluation for individualized goals  Description: Treatment Interventions: ADL retraining,Functional transfer training,UE strengthening/ROM,Endurance training,Patient/family training,Equipment evaluation/education,Compensatory technique education          See flowsheet documentation for full assessment, interventions and recommendations  Note: Limitation: Decreased ADL status,Decreased UE strength,Decreased Safe judgement during ADL,Decreased endurance,Decreased high-level ADLs  Prognosis: Good  Assessment: Pt is a 72y/o female admitted to the hospital 2* symptoms of SOB, cough, decreased PO intake  Pt noted with acute respiratory failure 2* COVID-19/PNA  PMH=neg  Per granddtr, PTA pt was independent with all aspects of her ADLs, transfers, ambulation--w/o device; neg falls, +home alone, neg   During initial eval, pt demonstrated deficits with her functional balance, functional mobility, ADL status, transfer safety, b/l UE strength, and activity tolerance(currently fair=15-20mins)  Pt would benefit from continued OT tx for the above deficits  3-5xwk/1-2wks        OT Discharge Recommendation:  (str vs home pending progress)

## 2021-12-31 NOTE — ASSESSMENT & PLAN NOTE
Home meds include Losartan and Chlorthalidone  Home meds currently on hold due to low blood pressure

## 2021-12-31 NOTE — PLAN OF CARE
Problem: MOBILITY - ADULT  Goal: Maintain or return to baseline ADL function  Description: INTERVENTIONS:  -  Assess patient's ability to carry out ADLs; assess patient's baseline for ADL function and identify physical deficits which impact ability to perform ADLs (bathing, care of mouth/teeth, toileting, grooming, dressing, etc )  - Assess/evaluate cause of self-care deficits   - Assess range of motion  - Assess patient's mobility; develop plan if impaired  - Assess patient's need for assistive devices and provide as appropriate  - Encourage maximum independence but intervene and supervise when necessary  - Involve family in performance of ADLs  - Assess for home care needs following discharge   - Consider OT consult to assist with ADL evaluation and planning for discharge  - Provide patient education as appropriate  Outcome: Progressing  Goal: Maintains/Returns to pre admission functional level  Description: INTERVENTIONS:  - Perform BMAT or MOVE assessment daily    - Set and communicate daily mobility goal to care team and patient/family/caregiver     - Collaborate with rehabilitation services on mobility goals if consulted    - Out of bed for toileting  - Record patient progress and toleration of activity level   Outcome: Progressing

## 2021-12-31 NOTE — ASSESSMENT & PLAN NOTE
Patient presented with acute respiratory failure with hypoxia secondary to bilateral COVID pneumonia   Initially requiring 15 L mid flow  Patient was placed on the COVID treatment protocol, and escalated to [facility] category of disease  Currently on HFNC  Wean O2 as tolerated  Respiratory protocol in place  Airway clearance protocol

## 2021-12-31 NOTE — ASSESSMENT & PLAN NOTE
Patient presented with bilateral pneumonia secondary to COVID-19, unvaccinated  Initially was in the [facility] category of disease requiring 15 L mid flow  Continue COVID treatment protocol in escalate to severe disease category  Remdesivir day 4/5-->was started while on moderate disease pathway  Decadron day #4--> Day 3 of high dose  Continue Heparin infusion  Continue Ceftriaxone/Doxycycline   Baricitinib day 4/14  -->249-->252 5  Daughter d/w pt importance of prone position via bedside phone  Lipitor back to home dose of 20mg

## 2021-12-31 NOTE — PHYSICAL THERAPY NOTE
Physical Therapy Cancellation Note           12/31/21 1318   PT Last Visit   PT Visit Date 12/31/21   Note Type   Note type Evaluation   Cancel Reasons Medical status   Additional Comments per RN, pt O2 desat w activity  not appropriate for therapy at this time         Renetta Cespedes, PT

## 2021-12-31 NOTE — ASSESSMENT & PLAN NOTE
Lab Results   Component Value Date    HGBA1C 9 2 (H) 05/26/2021       Recent Labs     12/30/21  0657 12/30/21  1118 12/30/21  1545 12/30/21 2047   POCGLU 268* 284* 371* 409*       Blood Sugar Average: Last 72 hrs:  (P) 471 [id number]   Lantus 10U qhs started yesterday  Continue SSI  Increase Lantus to 15IU qhs  Continue accuchecks  Avoid hypoglycemia

## 2021-12-31 NOTE — PROGRESS NOTES
[address] [facility]  Progress Note - Dionte Mindy [birth year], 67 y o  female MRN: [id number]  Unit/Bed#: ICU 07 Encounter: [id number]  Primary Care Provider: Lizzette Bueno MD   Date and time admitted to hospital: 12/28/2021  5:16 PM    * Acute respiratory failure with hypoxia [facility])  Assessment & Plan  Patient presented with acute respiratory failure with hypoxia secondary to bilateral COVID pneumonia   Initially requiring 15 L mid flow  Patient was placed on the COVID treatment protocol, and escalated to [facility] category of disease  Currently on HFNC  Wean O2 as tolerated  Respiratory protocol in place  Airway clearance protocol  Pneumonia due to COVID-19 virus  Assessment & Plan  Patient presented with bilateral pneumonia secondary to COVID-19, unvaccinated  Initially was in the [facility] category of disease requiring 15 L mid flow  Continue COVID treatment protocol in escalate to severe disease category  Remdesivir day 4/5-->was started while on moderate disease pathway  Decadron day #4--> Day 3 of high dose  Continue Heparin infusion  Continue Ceftriaxone/Doxycycline   Baricitinib day 4/14  -->249-->252 5  Daughter d/w pt importance of prone position via bedside phone  Lipitor back to home dose of 20mg    Sepsis [facility])  Assessment & Plan  Patient presented with sepsis,POA with tachypnea/significant leuckocytosis  Started on the COVID treatment protocol with remdesivir/dexamethasone  As she is in the [facility] disease category she is also on ceftriaxone/doxycycline  Anticipate discontinuing antibiotics when procalcitonin negative x2  Encourage incentive spirometry    Airway clearance protocol    Essential hypertension  Assessment & Plan  Home meds include Losartan and Chlorthalidone  Home meds currently on hold due to low blood pressure    Type 2 diabetes mellitus without complication, without long-term current use of insulin [facility])  Assessment & Plan  Lab Results   Component Value Date HGBA1C 9 2 (H) 05/26/2021       Recent Labs     12/30/21  0657 12/30/21  1118 12/30/21  1545 12/30/21  2047   POCGLU 268* 284* 371* 409*       Blood Sugar Average: Last 72 hrs:  (P) 727 [id number]   Lantus 10U qhs started yesterday  Continue SSI  Increase Lantus to 15IU qhs  Continue accuchecks  Avoid hypoglycemia        ----------------------------------------------------------------------------------------  HPI/24hr events: Hospital day 3, no significant overnight events  Patient remains on HFNC with nonrebreather  Patient appropriate for transfer out of the ICU today?: No  Disposition: Continue Critical Care   Code Status: Level 1 - Full Code  ---------------------------------------------------------------------------------------  SUBJECTIVE  Patient states that she feels a little better, but is still experiencing shortness of breath and a nonproductive cough  She reports no issues with urinating and bowel movements, but has been experiencing nausea with meals  Also reports a sensation of choking when consuming cold beverages  Review of Systems   Constitutional: Negative for appetite change, chills, fever and unexpected weight change  HENT: Negative for congestion, postnasal drip, rhinorrhea, sinus pain, sore throat, trouble swallowing and voice change  Respiratory: Positive for cough, choking and shortness of breath  Negative for chest tightness, wheezing and stridor  Cardiovascular: Negative for chest pain, palpitations and leg swelling  Gastrointestinal: Positive for nausea (with meals)  Negative for abdominal distention, abdominal pain, constipation, diarrhea and vomiting  Genitourinary: Negative for difficulty urinating  Skin: Negative for pallor  Neurological: Negative for light-headedness and headaches  Psychiatric/Behavioral: Negative for behavioral problems       Review of systems was reviewed and negative unless stated above in HPI/24-hour events ---------------------------------------------------------------------------------------  OBJECTIVE    Vitals   Vitals:    21 0400 21 0500 21 0600 21 0756   BP: 126/71 101/56 131/60    BP Location: Left arm Left arm Left arm    Pulse: 62 (!) 54 62    Resp: 17 16 19    Temp:    (!) 97 2 °F (36 2 °C)   TempSrc:    Temporal   SpO2: 93% 95% 90%    Weight:       Height:         Temp (24hrs), Av 2 °F (36 2 °C), Min:97 °F (36 1 °C), Max:98 °F (36 7 °C)  Current: Temperature: (!) 97 2 °F (36 2 °C)          Respiratory:  SpO2: SpO2: 90 %       Invasive/non-invasive ventilation settings   Respiratory  Report   Lab Data (Last 4 hours)    None         O2/Vent Data (Last 4 hours)    None                Physical Exam  Vitals and nursing note reviewed  Constitutional:       General: She is not in acute distress  Appearance: She is well-developed  She is not ill-appearing, toxic-appearing or diaphoretic  HENT:      Head: Normocephalic and atraumatic  Eyes:      Extraocular Movements: Extraocular movements intact  Conjunctiva/sclera: Conjunctivae normal    Cardiovascular:      Rate and Rhythm: Normal rate and regular rhythm  Pulses: Normal pulses  Heart sounds: Normal heart sounds  No murmur heard  Pulmonary:      Effort: Pulmonary effort is normal  No respiratory distress  Breath sounds: Decreased breath sounds present  No wheezing, rhonchi or rales  Chest:      Chest wall: No tenderness  Abdominal:      General: Bowel sounds are normal       Palpations: Abdomen is soft  Tenderness: There is no abdominal tenderness  There is no guarding  Musculoskeletal:      Cervical back: Normal range of motion and neck supple  Right lower leg: No tenderness  No edema  Left lower leg: No tenderness  No edema  Comments: Trace pitting edema noted in left upper extremity  Skin:     General: Skin is warm and dry  Coloration: Skin is not pale  Neurological:      Mental Status: She is alert and oriented to person, place, and time  Psychiatric:         Mood and Affect: Mood normal          Behavior: Behavior normal              Laboratory and Diagnostics:  Results from last 7 days   Lab Units 12/31/21  0553 12/30/21  0428 12/29/21  0637 12/28/21  1807   WBC Thousand/uL 20 19* 17 45* 17 47* 21 43*   HEMOGLOBIN g/dL 11 7 12 3 12 8 15 3   HEMATOCRIT % 34 6* 36 6 37 9 44 0   PLATELETS Thousands/uL 179 154 158 191   BANDS PCT %  --   --   --  2   MONO PCT %  --   --   --  1*     Results from last 7 days   Lab Units 12/31/21  0553 12/30/21  0428 12/29/21  0637 12/28/21  2120   SODIUM mmol/L 146* 141 143 130*   POTASSIUM mmol/L 4 4 4 1 3 1* 4 0   CHLORIDE mmol/L 107 102 109* 89*   CO2 mmol/L 25 24 16* 24   ANION GAP mmol/L 14* 15* 18* 17*   BUN mg/dL 36* 36* 22 33*   CREATININE mg/dL 0 99 1 13 0 76 1 32*   CALCIUM mg/dL 7 8* 7 5* 5 4* 7 6*   GLUCOSE RANDOM mg/dL 233* 239* 143* 216*   ALT U/L 20 24 16 26   AST U/L 19 24 17 28   ALK PHOS U/L 130* 131* 89 146*   ALBUMIN g/dL 1 7* 1 5* 1 1* 2 0*   TOTAL BILIRUBIN mg/dL 0 44 0 43 0 37 0 71          Results from last 7 days   Lab Units 12/31/21  0553 12/30/21  1902 12/30/21  1120 12/30/21  0131 12/29/21  1930 12/29/21  1213 12/29/21  1052   INR   --   --   --   --   --  1 25* 1 51*   PTT seconds 62* 134* 49* 193* 59* 130* >210*          Results from last 7 days   Lab Units 12/29/21  0637 12/29/21  0013 12/28/21  2359 12/28/21  2120   LACTIC ACID mmol/L 1 5 2 0 2 0 3 3*     ABG:    VBG:    Results from last 7 days   Lab Units 12/30/21  0428 12/29/21  1213 12/29/21  0637 12/28/21 1807   PROCALCITONIN ng/ml 0 49* 0 89* 0 70* 0 98*       Micro  Results from last 7 days   Lab Units 12/28/21 1807 12/28/21 1806   BLOOD CULTURE  No Growth at 48 hrs  No Growth at 48 hrs  EKG: Sinus rhythm, heart rate in the 60s  Imaging: I have personally reviewed pertinent reports     and I have personally reviewed pertinent films in PACS    Intake and Output  I/O       12/29 0701  12/30 0700 12/30 0701  12/31 0700    P  O   100    I V  (mL/kg) 602 1 (9 9) 113 6 (1 9)    IV Piggyback 320 320    Total Intake(mL/kg) 922 1 (15 2) 533 6 (8 8)    Urine (mL/kg/hr) 1000 (0 7) 750 (0 5)    Total Output 1000 750    Net -77 9 -216 4                Height and Weights   Height: 4' 11 5" (151 1 cm)     Body mass index is 26 53 kg/m²  Weight (last 2 days)     Date/Time Weight    12/29/21 1403 60 6 (133 6)            Nutrition       Diet Orders   (From admission, onward)             Start     Ordered    12/30/21 1234  Diet Dysphagia/Modified Consistency; Dysphagia 2-Mechanical Soft; Thin Liquid; Consistent Carbohydrate Diet Level 1 (4 carb servings/60 grams CHO/meal)  Diet effective now        References:    Nutrtion Support Algorithm Enteral vs  Parenteral   Question Answer Comment   Diet Type Dysphagia/Modified Consistency    Dysphagia/Modified Consistency Dysphagia 2-Mechanical Soft    Liquid Modifier Thin Liquid    Other Restriction(s): Consistent Carbohydrate Diet Level 1 (4 carb servings/60 grams CHO/meal)    RD to adjust diet per protocol?  Yes        12/30/21 1233                  Active Medications  Scheduled Meds:  Current Facility-Administered Medications   Medication Dose Route Frequency Provider Last Rate    acetaminophen  650 mg Oral Q6H PRN Bonita Jeffries MD      aspirin  81 mg Oral Daily Bonita Jeffries MD      atorvastatin  20 mg Oral Daily With Chema Vigil MD      Baricitinib  2 mg Oral Q24H Johnson Bhatti MD      benzonatate  100 mg Oral TID PRN Alli Prim, CRNP      calcium carbonate  500 mg Oral BID PRN Bonita Jeffries MD      cefTRIAXone  1,000 mg Intravenous Q24H Bonita Jeffries MD Stopped (12/30/21 0294)    dexamethasone  0 1 mg/kg Intravenous Q12H Arabella Scales MD      dextromethorphan-guaiFENesin  10 mL Oral Q4H PRN Alli Prim, CRNP      doxycycline hyclate  100 mg Oral Q12H Sharon Kwaku Recinos MD      famotidine  20 mg Oral Daily Rufina Null, MD      heparin (porcine)  3-20 Units/kg/hr (Order-Specific) Intravenous Titrated Rufina Null, MD 7 Units/kg/hr (12/30/21 2100)    heparin (porcine)  1,800 Units Intravenous Q1H PRN Rufina Null, MD      heparin (porcine)  3,600 Units Intravenous Q1H PRN Rufina Dudley, MD      insulin glargine  10 Units Subcutaneous HS Shelby Lino MD      insulin lispro  1-5 Units Subcutaneous 4x Daily (AC & HS) Yao Black MD      lidocaine  1 patch Topical Daily Rufina Null, MD      melatonin  6 mg Oral HS Rufina Null, MD      ondansetron  4 mg Intravenous Q6H PRN Rufina Null, MD      remdesivir  100 mg Intravenous Q24H Rufina Null, MD      sodium chloride  4 mL Nebulization Q6H ABIEL Trejo       Continuous Infusions:  heparin (porcine), 3-20 Units/kg/hr (Order-Specific), Last Rate: 7 Units/kg/hr (12/30/21 2100)      PRN Meds:   acetaminophen, 650 mg, Q6H PRN  benzonatate, 100 mg, TID PRN  calcium carbonate, 500 mg, BID PRN  dextromethorphan-guaiFENesin, 10 mL, Q4H PRN  heparin (porcine), 1,800 Units, Q1H PRN  heparin (porcine), 3,600 Units, Q1H PRN  ondansetron, 4 mg, Q6H PRN        Invasive Devices Review  Invasive Devices  Report    Peripheral Intravenous Line            Peripheral IV 12/28/21 Distal;Dorsal (posterior); Right Wrist 2 days    Peripheral IV 12/29/21 Left;Upper;Ventral (anterior) Arm 1 day                Rationale for remaining devices: Severity of illness  ---------------------------------------------------------------------------------------  Advance Directive and Living Will:      Power of :    POLST:    ---------------------------------------------------------------------------------------  Care Time Delivered:   No Critical Care time spent       Yao Black MD      Portions of the record may have been created with voice recognition software    Occasional wrong word or "sound a like" substitutions may have occurred due to the inherent limitations of voice recognition software    Read the chart carefully and recognize, using context, where substitutions have occurred

## 2021-12-31 NOTE — ASSESSMENT & PLAN NOTE
Patient presented with sepsis,POA with tachypnea/significant leuckocytosis  Started on the COVID treatment protocol with remdesivir/dexamethasone  As she is in the [place] disease category she is also on ceftriaxone/doxycycline  Anticipate discontinuing antibiotics when procalcitonin negative x2  Encourage incentive spirometry    Airway clearance protocol

## 2021-12-31 NOTE — PLAN OF CARE
Problem: PAIN - ADULT  Goal: Verbalizes/displays adequate comfort level or baseline comfort level  Description: Interventions:  - Encourage patient to monitor pain and request assistance  - Assess pain using appropriate pain scale  - Administer analgesics based on type and severity of pain and evaluate response  - Implement non-pharmacological measures as appropriate and evaluate response  - Consider cultural and social influences on pain and pain management  - Notify physician/advanced practitioner if interventions unsuccessful or patient reports new pain  Outcome: Progressing     Problem: Prexisting or High Potential for Compromised Skin Integrity  Goal: Skin integrity is maintained or improved  Description: INTERVENTIONS:  - Identify patients at risk for skin breakdown  - Assess and monitor skin integrity  - Assess and monitor nutrition and hydration status  - Monitor labs   - Assess for incontinence   - Turn and reposition patient  - Assist with mobility/ambulation  - Relieve pressure over bony prominences  - Avoid friction and shearing  - Provide appropriate hygiene as needed including keeping skin clean and dry  - Evaluate need for skin moisturizer/barrier cream  - Collaborate with interdisciplinary team   - Patient/family teaching  - Consider wound care consult   Outcome: Progressing

## 2022-01-01 ENCOUNTER — APPOINTMENT (INPATIENT)
Dept: NON INVASIVE DIAGNOSTICS | Facility: HOSPITAL | Age: 77
DRG: 871 | End: 2022-01-01
Payer: COMMERCIAL

## 2022-01-01 ENCOUNTER — APPOINTMENT (INPATIENT)
Dept: RADIOLOGY | Facility: HOSPITAL | Age: 77
DRG: 871 | End: 2022-01-01
Payer: COMMERCIAL

## 2022-01-01 ENCOUNTER — ANESTHESIA EVENT (INPATIENT)
Dept: ANESTHESIOLOGY | Facility: HOSPITAL | Age: 77
DRG: 871 | End: 2022-01-01
Payer: COMMERCIAL

## 2022-01-01 ENCOUNTER — ANESTHESIA (INPATIENT)
Dept: ANESTHESIOLOGY | Facility: HOSPITAL | Age: 77
DRG: 871 | End: 2022-01-01
Payer: COMMERCIAL

## 2022-01-01 VITALS
OXYGEN SATURATION: 75 % | RESPIRATION RATE: 27 BRPM | DIASTOLIC BLOOD PRESSURE: 28 MMHG | SYSTOLIC BLOOD PRESSURE: 42 MMHG | WEIGHT: 137.79 LBS | BODY MASS INDEX: 27.05 KG/M2 | HEART RATE: 102 BPM | HEIGHT: 60 IN | TEMPERATURE: 97.5 F

## 2022-01-01 LAB
ALBUMIN SERPL BCP-MCNC: 1.4 G/DL (ref 3.5–5)
ALBUMIN SERPL BCP-MCNC: 1.7 G/DL (ref 3.5–5)
ALBUMIN SERPL BCP-MCNC: 1.7 G/DL (ref 3.5–5)
ALP SERPL-CCNC: 118 U/L (ref 46–116)
ALP SERPL-CCNC: 137 U/L (ref 46–116)
ALP SERPL-CCNC: 146 U/L (ref 46–116)
ALT SERPL W P-5'-P-CCNC: 16 U/L (ref 12–78)
ALT SERPL W P-5'-P-CCNC: 18 U/L (ref 12–78)
ALT SERPL W P-5'-P-CCNC: 19 U/L (ref 12–78)
ANION GAP SERPL CALCULATED.3IONS-SCNC: 10 MMOL/L (ref 4–13)
ANION GAP SERPL CALCULATED.3IONS-SCNC: 5 MMOL/L (ref 4–13)
ANION GAP SERPL CALCULATED.3IONS-SCNC: 6 MMOL/L (ref 4–13)
ANION GAP SERPL CALCULATED.3IONS-SCNC: 6 MMOL/L (ref 4–13)
ANION GAP SERPL CALCULATED.3IONS-SCNC: 7 MMOL/L (ref 4–13)
APTT PPP: 119 SECONDS (ref 23–37)
APTT PPP: 160 SECONDS (ref 23–37)
APTT PPP: 37 SECONDS (ref 23–37)
APTT PPP: 51 SECONDS (ref 23–37)
APTT PPP: 54 SECONDS (ref 23–37)
APTT PPP: 65 SECONDS (ref 23–37)
APTT PPP: 73 SECONDS (ref 23–37)
APTT PPP: 74 SECONDS (ref 23–37)
APTT PPP: 82 SECONDS (ref 23–37)
APTT PPP: >210 SECONDS (ref 23–37)
AST SERPL W P-5'-P-CCNC: 21 U/L (ref 5–45)
AST SERPL W P-5'-P-CCNC: 21 U/L (ref 5–45)
AST SERPL W P-5'-P-CCNC: 31 U/L (ref 5–45)
ATRIAL RATE: 107 BPM
BACTERIA BLD CULT: NORMAL
BACTERIA BLD CULT: NORMAL
BASOPHILS # BLD AUTO: 0.04 THOUSANDS/ΜL (ref 0–0.1)
BASOPHILS # BLD AUTO: 0.04 THOUSANDS/ΜL (ref 0–0.1)
BASOPHILS # BLD MANUAL: 0 THOUSAND/UL (ref 0–0.1)
BASOPHILS NFR BLD AUTO: 0 % (ref 0–1)
BASOPHILS NFR BLD AUTO: 0 % (ref 0–1)
BASOPHILS NFR MAR MANUAL: 0 % (ref 0–1)
BILIRUB SERPL-MCNC: 0.52 MG/DL (ref 0.2–1)
BILIRUB SERPL-MCNC: 0.65 MG/DL (ref 0.2–1)
BILIRUB SERPL-MCNC: 0.78 MG/DL (ref 0.2–1)
BUN SERPL-MCNC: 33 MG/DL (ref 5–25)
BUN SERPL-MCNC: 34 MG/DL (ref 5–25)
BUN SERPL-MCNC: 35 MG/DL (ref 5–25)
BUN SERPL-MCNC: 44 MG/DL (ref 5–25)
BUN SERPL-MCNC: 54 MG/DL (ref 5–25)
CALCIUM ALBUM COR SERPL-MCNC: 9.7 MG/DL (ref 8.3–10.1)
CALCIUM ALBUM COR SERPL-MCNC: 9.8 MG/DL (ref 8.3–10.1)
CALCIUM ALBUM COR SERPL-MCNC: 9.9 MG/DL (ref 8.3–10.1)
CALCIUM SERPL-MCNC: 7.6 MG/DL (ref 8.3–10.1)
CALCIUM SERPL-MCNC: 8 MG/DL (ref 8.3–10.1)
CALCIUM SERPL-MCNC: 8.1 MG/DL (ref 8.3–10.1)
CALCIUM SERPL-MCNC: 8.3 MG/DL (ref 8.3–10.1)
CARDIAC TROPONIN I PNL SERPL HS: 8 NG/L
CHLORIDE SERPL-SCNC: 106 MMOL/L (ref 100–108)
CHLORIDE SERPL-SCNC: 107 MMOL/L (ref 100–108)
CHLORIDE SERPL-SCNC: 107 MMOL/L (ref 100–108)
CHLORIDE SERPL-SCNC: 108 MMOL/L (ref 100–108)
CHLORIDE SERPL-SCNC: 110 MMOL/L (ref 100–108)
CO2 SERPL-SCNC: 27 MMOL/L (ref 21–32)
CO2 SERPL-SCNC: 27 MMOL/L (ref 21–32)
CO2 SERPL-SCNC: 28 MMOL/L (ref 21–32)
CO2 SERPL-SCNC: 29 MMOL/L (ref 21–32)
CO2 SERPL-SCNC: 30 MMOL/L (ref 21–32)
CREAT SERPL-MCNC: 0.79 MG/DL (ref 0.6–1.3)
CREAT SERPL-MCNC: 0.87 MG/DL (ref 0.6–1.3)
CREAT SERPL-MCNC: 0.87 MG/DL (ref 0.6–1.3)
CREAT SERPL-MCNC: 0.88 MG/DL (ref 0.6–1.3)
CREAT SERPL-MCNC: 0.97 MG/DL (ref 0.6–1.3)
CREAT SERPL-MCNC: 0.99 MG/DL (ref 0.6–1.3)
CREAT SERPL-MCNC: 1 MG/DL (ref 0.6–1.3)
CRP SERPL QL: 65.5 MG/L
EOSINOPHIL # BLD AUTO: 0.01 THOUSAND/ΜL (ref 0–0.61)
EOSINOPHIL # BLD AUTO: 0.01 THOUSAND/ΜL (ref 0–0.61)
EOSINOPHIL # BLD MANUAL: 0 THOUSAND/UL (ref 0–0.4)
EOSINOPHIL NFR BLD AUTO: 0 % (ref 0–6)
EOSINOPHIL NFR BLD AUTO: 0 % (ref 0–6)
EOSINOPHIL NFR BLD MANUAL: 0 % (ref 0–6)
ERYTHROCYTE [DISTWIDTH] IN BLOOD BY AUTOMATED COUNT: 16.3 % (ref 11.6–15.1)
ERYTHROCYTE [DISTWIDTH] IN BLOOD BY AUTOMATED COUNT: 16.5 % (ref 11.6–15.1)
ERYTHROCYTE [DISTWIDTH] IN BLOOD BY AUTOMATED COUNT: 16.6 % (ref 11.6–15.1)
ERYTHROCYTE [DISTWIDTH] IN BLOOD BY AUTOMATED COUNT: 16.8 % (ref 11.6–15.1)
ERYTHROCYTE [DISTWIDTH] IN BLOOD BY AUTOMATED COUNT: 17.2 % (ref 11.6–15.1)
ERYTHROCYTE [DISTWIDTH] IN BLOOD BY AUTOMATED COUNT: 17.4 % (ref 11.6–15.1)
ERYTHROCYTE [DISTWIDTH] IN BLOOD BY AUTOMATED COUNT: 17.7 % (ref 11.6–15.1)
GFR SERPL CREATININE-BSD FRML MDRD: 54 ML/MIN/1.73SQ M
GFR SERPL CREATININE-BSD FRML MDRD: 55 ML/MIN/1.73SQ M
GFR SERPL CREATININE-BSD FRML MDRD: 56 ML/MIN/1.73SQ M
GFR SERPL CREATININE-BSD FRML MDRD: 63 ML/MIN/1.73SQ M
GFR SERPL CREATININE-BSD FRML MDRD: 64 ML/MIN/1.73SQ M
GFR SERPL CREATININE-BSD FRML MDRD: 64 ML/MIN/1.73SQ M
GFR SERPL CREATININE-BSD FRML MDRD: 72 ML/MIN/1.73SQ M
GLUCOSE SERPL-MCNC: 101 MG/DL (ref 65–140)
GLUCOSE SERPL-MCNC: 103 MG/DL (ref 65–140)
GLUCOSE SERPL-MCNC: 103 MG/DL (ref 65–140)
GLUCOSE SERPL-MCNC: 111 MG/DL (ref 65–140)
GLUCOSE SERPL-MCNC: 120 MG/DL (ref 65–140)
GLUCOSE SERPL-MCNC: 124 MG/DL (ref 65–140)
GLUCOSE SERPL-MCNC: 127 MG/DL (ref 65–140)
GLUCOSE SERPL-MCNC: 128 MG/DL (ref 65–140)
GLUCOSE SERPL-MCNC: 132 MG/DL (ref 65–140)
GLUCOSE SERPL-MCNC: 152 MG/DL (ref 65–140)
GLUCOSE SERPL-MCNC: 158 MG/DL (ref 65–140)
GLUCOSE SERPL-MCNC: 159 MG/DL (ref 65–140)
GLUCOSE SERPL-MCNC: 168 MG/DL (ref 65–140)
GLUCOSE SERPL-MCNC: 169 MG/DL (ref 65–140)
GLUCOSE SERPL-MCNC: 177 MG/DL (ref 65–140)
GLUCOSE SERPL-MCNC: 183 MG/DL (ref 65–140)
GLUCOSE SERPL-MCNC: 186 MG/DL (ref 65–140)
GLUCOSE SERPL-MCNC: 216 MG/DL (ref 65–140)
GLUCOSE SERPL-MCNC: 229 MG/DL (ref 65–140)
GLUCOSE SERPL-MCNC: 234 MG/DL (ref 65–140)
GLUCOSE SERPL-MCNC: 244 MG/DL (ref 65–140)
GLUCOSE SERPL-MCNC: 316 MG/DL (ref 65–140)
GLUCOSE SERPL-MCNC: 337 MG/DL (ref 65–140)
GLUCOSE SERPL-MCNC: 387 MG/DL (ref 65–140)
GLUCOSE SERPL-MCNC: 62 MG/DL (ref 65–140)
GLUCOSE SERPL-MCNC: 68 MG/DL (ref 65–140)
GLUCOSE SERPL-MCNC: 74 MG/DL (ref 65–140)
GLUCOSE SERPL-MCNC: 77 MG/DL (ref 65–140)
GLUCOSE SERPL-MCNC: 81 MG/DL (ref 65–140)
GLUCOSE SERPL-MCNC: 83 MG/DL (ref 65–140)
GLUCOSE SERPL-MCNC: 83 MG/DL (ref 65–140)
GLUCOSE SERPL-MCNC: 84 MG/DL (ref 65–140)
HCT VFR BLD AUTO: 32.2 % (ref 34.8–46.1)
HCT VFR BLD AUTO: 34.9 % (ref 34.8–46.1)
HCT VFR BLD AUTO: 35.2 % (ref 34.8–46.1)
HCT VFR BLD AUTO: 35.6 % (ref 34.8–46.1)
HCT VFR BLD AUTO: 39.2 % (ref 34.8–46.1)
HCT VFR BLD AUTO: 41.9 % (ref 34.8–46.1)
HCT VFR BLD AUTO: 43.6 % (ref 34.8–46.1)
HGB BLD-MCNC: 10.9 G/DL (ref 11.5–15.4)
HGB BLD-MCNC: 11.1 G/DL (ref 11.5–15.4)
HGB BLD-MCNC: 11.3 G/DL (ref 11.5–15.4)
HGB BLD-MCNC: 11.8 G/DL (ref 11.5–15.4)
HGB BLD-MCNC: 12.8 G/DL (ref 11.5–15.4)
HGB BLD-MCNC: 13.5 G/DL (ref 11.5–15.4)
HGB BLD-MCNC: 13.7 G/DL (ref 11.5–15.4)
IMM GRANULOCYTES # BLD AUTO: 0.34 THOUSAND/UL (ref 0–0.2)
IMM GRANULOCYTES # BLD AUTO: >0.5 THOUSAND/UL (ref 0–0.2)
IMM GRANULOCYTES NFR BLD AUTO: 2 % (ref 0–2)
IMM GRANULOCYTES NFR BLD AUTO: 2 % (ref 0–2)
LYMPHOCYTES # BLD AUTO: 0.42 THOUSANDS/ΜL (ref 0.6–4.47)
LYMPHOCYTES # BLD AUTO: 0.44 THOUSAND/UL (ref 0.6–4.47)
LYMPHOCYTES # BLD AUTO: 0.98 THOUSANDS/ΜL (ref 0.6–4.47)
LYMPHOCYTES # BLD AUTO: 2 % (ref 14–44)
LYMPHOCYTES NFR BLD AUTO: 2 % (ref 14–44)
LYMPHOCYTES NFR BLD AUTO: 4 % (ref 14–44)
MCH RBC QN AUTO: 28.3 PG (ref 26.8–34.3)
MCH RBC QN AUTO: 28.4 PG (ref 26.8–34.3)
MCH RBC QN AUTO: 28.8 PG (ref 26.8–34.3)
MCH RBC QN AUTO: 29.3 PG (ref 26.8–34.3)
MCH RBC QN AUTO: 29.4 PG (ref 26.8–34.3)
MCH RBC QN AUTO: 29.8 PG (ref 26.8–34.3)
MCH RBC QN AUTO: 29.9 PG (ref 26.8–34.3)
MCHC RBC AUTO-ENTMCNC: 31.4 G/DL (ref 31.4–37.4)
MCHC RBC AUTO-ENTMCNC: 31.8 G/DL (ref 31.4–37.4)
MCHC RBC AUTO-ENTMCNC: 32.1 G/DL (ref 31.4–37.4)
MCHC RBC AUTO-ENTMCNC: 32.2 G/DL (ref 31.4–37.4)
MCHC RBC AUTO-ENTMCNC: 32.7 G/DL (ref 31.4–37.4)
MCHC RBC AUTO-ENTMCNC: 33.1 G/DL (ref 31.4–37.4)
MCHC RBC AUTO-ENTMCNC: 33.9 G/DL (ref 31.4–37.4)
MCV RBC AUTO: 88 FL (ref 82–98)
MCV RBC AUTO: 88 FL (ref 82–98)
MCV RBC AUTO: 90 FL (ref 82–98)
MCV RBC AUTO: 91 FL (ref 82–98)
MCV RBC AUTO: 91 FL (ref 82–98)
MONOCYTES # BLD AUTO: 0.88 THOUSAND/UL (ref 0–1.22)
MONOCYTES # BLD AUTO: 1.02 THOUSAND/ΜL (ref 0.17–1.22)
MONOCYTES # BLD AUTO: 1.71 THOUSAND/ΜL (ref 0.17–1.22)
MONOCYTES NFR BLD AUTO: 5 % (ref 4–12)
MONOCYTES NFR BLD AUTO: 7 % (ref 4–12)
MONOCYTES NFR BLD: 4 % (ref 4–12)
NEUTROPHILS # BLD AUTO: 17.54 THOUSANDS/ΜL (ref 1.85–7.62)
NEUTROPHILS # BLD AUTO: 20.1 THOUSANDS/ΜL (ref 1.85–7.62)
NEUTROPHILS # BLD MANUAL: 20.59 THOUSAND/UL (ref 1.85–7.62)
NEUTS BAND NFR BLD MANUAL: 1 % (ref 0–8)
NEUTS SEG NFR BLD AUTO: 87 % (ref 43–75)
NEUTS SEG NFR BLD AUTO: 91 % (ref 43–75)
NEUTS SEG NFR BLD AUTO: 93 % (ref 43–75)
NRBC BLD AUTO-RTO: 0 /100 WBCS
P AXIS: 46 DEGREES
PLATELET # BLD AUTO: 182 THOUSANDS/UL (ref 149–390)
PLATELET # BLD AUTO: 184 THOUSANDS/UL (ref 149–390)
PLATELET # BLD AUTO: 215 THOUSANDS/UL (ref 149–390)
PLATELET # BLD AUTO: 217 THOUSANDS/UL (ref 149–390)
PLATELET # BLD AUTO: 262 THOUSANDS/UL (ref 149–390)
PLATELET # BLD AUTO: 276 THOUSANDS/UL (ref 149–390)
PLATELET # BLD AUTO: 304 THOUSANDS/UL (ref 149–390)
PLATELET BLD QL SMEAR: ADEQUATE
PMV BLD AUTO: 10.1 FL (ref 8.9–12.7)
PMV BLD AUTO: 10.1 FL (ref 8.9–12.7)
PMV BLD AUTO: 10.4 FL (ref 8.9–12.7)
PMV BLD AUTO: 10.8 FL (ref 8.9–12.7)
PMV BLD AUTO: 9.5 FL (ref 8.9–12.7)
PMV BLD AUTO: 9.8 FL (ref 8.9–12.7)
PMV BLD AUTO: 9.8 FL (ref 8.9–12.7)
POTASSIUM SERPL-SCNC: 4.4 MMOL/L (ref 3.5–5.3)
POTASSIUM SERPL-SCNC: 4.4 MMOL/L (ref 3.5–5.3)
POTASSIUM SERPL-SCNC: 4.5 MMOL/L (ref 3.5–5.3)
POTASSIUM SERPL-SCNC: 4.5 MMOL/L (ref 3.5–5.3)
POTASSIUM SERPL-SCNC: 4.6 MMOL/L (ref 3.5–5.3)
POTASSIUM SERPL-SCNC: 4.8 MMOL/L (ref 3.5–5.3)
POTASSIUM SERPL-SCNC: 5.2 MMOL/L (ref 3.5–5.3)
PR INTERVAL: 117 MS
PROCALCITONIN SERPL-MCNC: 0.08 NG/ML
PROCALCITONIN SERPL-MCNC: 0.14 NG/ML
PROT SERPL-MCNC: 5.4 G/DL (ref 6.4–8.2)
PROT SERPL-MCNC: 5.8 G/DL (ref 6.4–8.2)
PROT SERPL-MCNC: 6.2 G/DL (ref 6.4–8.2)
QRS AXIS: 56 DEGREES
QRSD INTERVAL: 75 MS
QT INTERVAL: 304 MS
QTC INTERVAL: 406 MS
RBC # BLD AUTO: 3.65 MILLION/UL (ref 3.81–5.12)
RBC # BLD AUTO: 3.86 MILLION/UL (ref 3.81–5.12)
RBC # BLD AUTO: 3.96 MILLION/UL (ref 3.81–5.12)
RBC # BLD AUTO: 3.99 MILLION/UL (ref 3.81–5.12)
RBC # BLD AUTO: 4.35 MILLION/UL (ref 3.81–5.12)
RBC # BLD AUTO: 4.61 MILLION/UL (ref 3.81–5.12)
RBC # BLD AUTO: 4.82 MILLION/UL (ref 3.81–5.12)
RBC MORPH BLD: NORMAL
SODIUM SERPL-SCNC: 141 MMOL/L (ref 136–145)
SODIUM SERPL-SCNC: 144 MMOL/L (ref 136–145)
SODIUM SERPL-SCNC: 145 MMOL/L (ref 136–145)
SODIUM SERPL-SCNC: 146 MMOL/L (ref 136–145)
SODIUM SERPL-SCNC: 147 MMOL/L (ref 136–145)
T WAVE AXIS: 16 DEGREES
VENTRICULAR RATE: 107 BPM
WBC # BLD AUTO: 18.97 THOUSAND/UL (ref 4.31–10.16)
WBC # BLD AUTO: 19.12 THOUSAND/UL (ref 4.31–10.16)
WBC # BLD AUTO: 19.37 THOUSAND/UL (ref 4.31–10.16)
WBC # BLD AUTO: 21.9 THOUSAND/UL (ref 4.31–10.16)
WBC # BLD AUTO: 22.13 THOUSAND/UL (ref 4.31–10.16)
WBC # BLD AUTO: 23.35 THOUSAND/UL (ref 4.31–10.16)
WBC # BLD AUTO: 28.88 THOUSAND/UL (ref 4.31–10.16)

## 2022-01-01 PROCEDURE — 82948 REAGENT STRIP/BLOOD GLUCOSE: CPT

## 2022-01-01 PROCEDURE — 80053 COMPREHEN METABOLIC PANEL: CPT | Performed by: NURSE PRACTITIONER

## 2022-01-01 PROCEDURE — 94644 CONT INHLJ TX 1ST HOUR: CPT

## 2022-01-01 PROCEDURE — 84484 ASSAY OF TROPONIN QUANT: CPT | Performed by: PHYSICIAN ASSISTANT

## 2022-01-01 PROCEDURE — 94640 AIRWAY INHALATION TREATMENT: CPT

## 2022-01-01 PROCEDURE — 99232 SBSQ HOSP IP/OBS MODERATE 35: CPT | Performed by: INTERNAL MEDICINE

## 2022-01-01 PROCEDURE — 85730 THROMBOPLASTIN TIME PARTIAL: CPT | Performed by: NURSE PRACTITIONER

## 2022-01-01 PROCEDURE — 97163 PT EVAL HIGH COMPLEX 45 MIN: CPT

## 2022-01-01 PROCEDURE — 94003 VENT MGMT INPAT SUBQ DAY: CPT

## 2022-01-01 PROCEDURE — 85730 THROMBOPLASTIN TIME PARTIAL: CPT | Performed by: INTERNAL MEDICINE

## 2022-01-01 PROCEDURE — 80053 COMPREHEN METABOLIC PANEL: CPT

## 2022-01-01 PROCEDURE — 85027 COMPLETE CBC AUTOMATED: CPT | Performed by: NURSE PRACTITIONER

## 2022-01-01 PROCEDURE — 85027 COMPLETE CBC AUTOMATED: CPT

## 2022-01-01 PROCEDURE — 84145 PROCALCITONIN (PCT): CPT | Performed by: NURSE PRACTITIONER

## 2022-01-01 PROCEDURE — 85027 COMPLETE CBC AUTOMATED: CPT | Performed by: PHYSICIAN ASSISTANT

## 2022-01-01 PROCEDURE — 71045 X-RAY EXAM CHEST 1 VIEW: CPT

## 2022-01-01 PROCEDURE — 94645 CONT INHLJ TX EACH ADDL HOUR: CPT

## 2022-01-01 PROCEDURE — NC001 PR NO CHARGE: Performed by: INTERNAL MEDICINE

## 2022-01-01 PROCEDURE — 94760 N-INVAS EAR/PLS OXIMETRY 1: CPT

## 2022-01-01 PROCEDURE — 93010 ELECTROCARDIOGRAM REPORT: CPT | Performed by: INTERNAL MEDICINE

## 2022-01-01 PROCEDURE — 93970 EXTREMITY STUDY: CPT | Performed by: SURGERY

## 2022-01-01 PROCEDURE — 80048 BASIC METABOLIC PNL TOTAL CA: CPT

## 2022-01-01 PROCEDURE — 80053 COMPREHEN METABOLIC PANEL: CPT | Performed by: PHYSICIAN ASSISTANT

## 2022-01-01 PROCEDURE — 86140 C-REACTIVE PROTEIN: CPT | Performed by: NURSE PRACTITIONER

## 2022-01-01 PROCEDURE — 85025 COMPLETE CBC W/AUTO DIFF WBC: CPT | Performed by: INTERNAL MEDICINE

## 2022-01-01 PROCEDURE — 99233 SBSQ HOSP IP/OBS HIGH 50: CPT | Performed by: INTERNAL MEDICINE

## 2022-01-01 PROCEDURE — 99238 HOSP IP/OBS DSCHRG MGMT 30/<: CPT | Performed by: NURSE PRACTITIONER

## 2022-01-01 PROCEDURE — 84145 PROCALCITONIN (PCT): CPT | Performed by: INTERNAL MEDICINE

## 2022-01-01 PROCEDURE — 85007 BL SMEAR W/DIFF WBC COUNT: CPT

## 2022-01-01 PROCEDURE — 80048 BASIC METABOLIC PNL TOTAL CA: CPT | Performed by: INTERNAL MEDICINE

## 2022-01-01 PROCEDURE — 94002 VENT MGMT INPAT INIT DAY: CPT

## 2022-01-01 PROCEDURE — 93005 ELECTROCARDIOGRAM TRACING: CPT

## 2022-01-01 PROCEDURE — 85025 COMPLETE CBC W/AUTO DIFF WBC: CPT

## 2022-01-01 PROCEDURE — 85027 COMPLETE CBC AUTOMATED: CPT | Performed by: INTERNAL MEDICINE

## 2022-01-01 PROCEDURE — 93970 EXTREMITY STUDY: CPT

## 2022-01-01 PROCEDURE — 94660 CPAP INITIATION&MGMT: CPT

## 2022-01-01 RX ORDER — SODIUM CHLORIDE FOR INHALATION 0.9 %
VIAL, NEBULIZER (ML) INHALATION
Status: COMPLETED
Start: 2022-01-01 | End: 2022-01-01

## 2022-01-01 RX ORDER — LEVALBUTEROL 1.25 MG/.5ML
1.25 SOLUTION, CONCENTRATE RESPIRATORY (INHALATION) EVERY 8 HOURS PRN
Status: DISCONTINUED | OUTPATIENT
Start: 2022-01-01 | End: 2022-01-01

## 2022-01-01 RX ORDER — OXYMETAZOLINE HYDROCHLORIDE 0.05 G/100ML
2 SPRAY NASAL EVERY 12 HOURS SCHEDULED
Status: DISCONTINUED | OUTPATIENT
Start: 2022-01-01 | End: 2022-01-01

## 2022-01-01 RX ORDER — QUETIAPINE FUMARATE 25 MG/1
25 TABLET, FILM COATED ORAL
Status: DISCONTINUED | OUTPATIENT
Start: 2022-01-01 | End: 2022-01-01

## 2022-01-01 RX ORDER — LORAZEPAM 2 MG/ML
0.5 INJECTION INTRAMUSCULAR ONCE
Status: COMPLETED | OUTPATIENT
Start: 2022-01-01 | End: 2022-01-01

## 2022-01-01 RX ORDER — INSULIN GLARGINE 100 [IU]/ML
30 INJECTION, SOLUTION SUBCUTANEOUS
Status: DISCONTINUED | OUTPATIENT
Start: 2022-01-01 | End: 2022-01-01

## 2022-01-01 RX ORDER — FUROSEMIDE 10 MG/ML
20 INJECTION INTRAMUSCULAR; INTRAVENOUS DAILY
Status: DISCONTINUED | OUTPATIENT
Start: 2022-01-01 | End: 2022-01-01

## 2022-01-01 RX ORDER — ONDANSETRON 2 MG/ML
4 INJECTION INTRAMUSCULAR; INTRAVENOUS ONCE
Status: COMPLETED | OUTPATIENT
Start: 2022-01-01 | End: 2022-01-01

## 2022-01-01 RX ORDER — FENTANYL CITRATE 50 UG/ML
25 INJECTION, SOLUTION INTRAMUSCULAR; INTRAVENOUS ONCE
Status: COMPLETED | OUTPATIENT
Start: 2022-01-01 | End: 2022-01-01

## 2022-01-01 RX ORDER — INSULIN GLARGINE 100 [IU]/ML
20 INJECTION, SOLUTION SUBCUTANEOUS
Status: DISCONTINUED | OUTPATIENT
Start: 2022-01-01 | End: 2022-01-01

## 2022-01-01 RX ORDER — QUETIAPINE FUMARATE 25 MG/1
50 TABLET, FILM COATED ORAL
Status: DISCONTINUED | OUTPATIENT
Start: 2022-01-01 | End: 2022-01-01

## 2022-01-01 RX ORDER — LORAZEPAM 2 MG/ML
INJECTION INTRAMUSCULAR
Status: COMPLETED
Start: 2022-01-01 | End: 2022-01-01

## 2022-01-01 RX ORDER — ACETAMINOPHEN 325 MG/1
650 TABLET ORAL ONCE
Status: COMPLETED | OUTPATIENT
Start: 2022-01-01 | End: 2022-01-01

## 2022-01-01 RX ORDER — SODIUM CHLORIDE, SODIUM LACTATE, POTASSIUM CHLORIDE, CALCIUM CHLORIDE 600; 310; 30; 20 MG/100ML; MG/100ML; MG/100ML; MG/100ML
75 INJECTION, SOLUTION INTRAVENOUS CONTINUOUS
Status: DISCONTINUED | OUTPATIENT
Start: 2022-01-01 | End: 2022-01-01

## 2022-01-01 RX ADMIN — FAMOTIDINE 20 MG: 20 TABLET ORAL at 09:27

## 2022-01-01 RX ADMIN — DEXAMETHASONE SODIUM PHOSPHATE 6.16 MG: 4 INJECTION INTRA-ARTICULAR; INTRALESIONAL; INTRAMUSCULAR; INTRAVENOUS; SOFT TISSUE at 11:39

## 2022-01-01 RX ADMIN — SODIUM CHLORIDE SOLN NEBU 3% 4 ML: 3 NEBU SOLN at 08:35

## 2022-01-01 RX ADMIN — DEXAMETHASONE SODIUM PHOSPHATE 6.16 MG: 4 INJECTION INTRA-ARTICULAR; INTRALESIONAL; INTRAMUSCULAR; INTRAVENOUS; SOFT TISSUE at 09:27

## 2022-01-01 RX ADMIN — SODIUM CHLORIDE SOLN NEBU 3% 4 ML: 3 NEBU SOLN at 03:44

## 2022-01-01 RX ADMIN — LORAZEPAM 0.5 MG: 2 INJECTION INTRAMUSCULAR at 07:22

## 2022-01-01 RX ADMIN — INSULIN GLARGINE 30 UNITS: 100 INJECTION, SOLUTION SUBCUTANEOUS at 21:33

## 2022-01-01 RX ADMIN — LIDOCAINE 1 PATCH: 50 PATCH CUTANEOUS at 11:01

## 2022-01-01 RX ADMIN — LIDOCAINE 1 PATCH: 50 PATCH CUTANEOUS at 09:30

## 2022-01-01 RX ADMIN — DEXAMETHASONE SODIUM PHOSPHATE 6.16 MG: 4 INJECTION INTRA-ARTICULAR; INTRALESIONAL; INTRAMUSCULAR; INTRAVENOUS; SOFT TISSUE at 08:21

## 2022-01-01 RX ADMIN — BARICITINIB 4 MG: 2 TABLET, FILM COATED ORAL at 10:00

## 2022-01-01 RX ADMIN — LORAZEPAM 0.5 MG: 2 INJECTION INTRAMUSCULAR; INTRAVENOUS at 04:14

## 2022-01-01 RX ADMIN — ATORVASTATIN CALCIUM 20 MG: 20 TABLET, FILM COATED ORAL at 18:24

## 2022-01-01 RX ADMIN — FAMOTIDINE 20 MG: 20 TABLET ORAL at 08:20

## 2022-01-01 RX ADMIN — DOXYCYCLINE 100 MG: 100 CAPSULE ORAL at 10:01

## 2022-01-01 RX ADMIN — INSULIN GLARGINE 30 UNITS: 100 INJECTION, SOLUTION SUBCUTANEOUS at 22:25

## 2022-01-01 RX ADMIN — MELATONIN TAB 3 MG 6 MG: 3 TAB at 23:03

## 2022-01-01 RX ADMIN — EPOPROSTENOL 50 NG/KG/MIN: 1.5 INJECTION, POWDER, LYOPHILIZED, FOR SOLUTION INTRAVENOUS at 17:02

## 2022-01-01 RX ADMIN — DEXAMETHASONE SODIUM PHOSPHATE 6.16 MG: 4 INJECTION INTRA-ARTICULAR; INTRALESIONAL; INTRAMUSCULAR; INTRAVENOUS; SOFT TISSUE at 09:30

## 2022-01-01 RX ADMIN — SODIUM CHLORIDE SOLN NEBU 3% 4 ML: 3 NEBU SOLN at 20:04

## 2022-01-01 RX ADMIN — SODIUM CHLORIDE 0.4 MCG/KG/HR: 9 INJECTION, SOLUTION INTRAVENOUS at 03:52

## 2022-01-01 RX ADMIN — EPOPROSTENOL 50 NG/KG/MIN: 1.5 INJECTION, POWDER, LYOPHILIZED, FOR SOLUTION INTRAVENOUS at 17:55

## 2022-01-01 RX ADMIN — QUETIAPINE FUMARATE 50 MG: 25 TABLET ORAL at 21:31

## 2022-01-01 RX ADMIN — SODIUM CHLORIDE 0.2 MCG/KG/HR: 9 INJECTION, SOLUTION INTRAVENOUS at 11:03

## 2022-01-01 RX ADMIN — SODIUM CHLORIDE SOLN NEBU 3% 4 ML: 3 NEBU SOLN at 06:03

## 2022-01-01 RX ADMIN — EPOPROSTENOL 50 NG/KG/MIN: 1.5 INJECTION, POWDER, LYOPHILIZED, FOR SOLUTION INTRAVENOUS at 08:07

## 2022-01-01 RX ADMIN — INSULIN GLARGINE 30 UNITS: 100 INJECTION, SOLUTION SUBCUTANEOUS at 21:42

## 2022-01-01 RX ADMIN — SODIUM CHLORIDE SOLN NEBU 3% 4 ML: 3 NEBU SOLN at 02:40

## 2022-01-01 RX ADMIN — MELATONIN TAB 3 MG 6 MG: 3 TAB at 21:31

## 2022-01-01 RX ADMIN — ONDANSETRON 4 MG: 2 INJECTION INTRAMUSCULAR; INTRAVENOUS at 06:06

## 2022-01-01 RX ADMIN — SODIUM CHLORIDE SOLN NEBU 3% 4 ML: 3 NEBU SOLN at 08:24

## 2022-01-01 RX ADMIN — DEXAMETHASONE SODIUM PHOSPHATE 6.16 MG: 4 INJECTION INTRA-ARTICULAR; INTRALESIONAL; INTRAMUSCULAR; INTRAVENOUS; SOFT TISSUE at 09:29

## 2022-01-01 RX ADMIN — FAMOTIDINE 20 MG: 20 TABLET ORAL at 11:40

## 2022-01-01 RX ADMIN — ATORVASTATIN CALCIUM 20 MG: 20 TABLET, FILM COATED ORAL at 18:07

## 2022-01-01 RX ADMIN — LEVALBUTEROL HYDROCHLORIDE 1.25 MG: 1.25 SOLUTION, CONCENTRATE RESPIRATORY (INHALATION) at 09:42

## 2022-01-01 RX ADMIN — OXYMETAZOLINE HYDROCHLORIDE 2 SPRAY: 0.05 SPRAY NASAL at 10:04

## 2022-01-01 RX ADMIN — HEPARIN SODIUM 9 UNITS/KG/HR: 10000 INJECTION, SOLUTION INTRAVENOUS at 23:16

## 2022-01-01 RX ADMIN — DOXYCYCLINE 100 MG: 100 CAPSULE ORAL at 21:00

## 2022-01-01 RX ADMIN — ASPIRIN 81 MG: 81 TABLET, COATED ORAL at 11:39

## 2022-01-01 RX ADMIN — SODIUM CHLORIDE SOLN NEBU 3% 4 ML: 3 NEBU SOLN at 07:27

## 2022-01-01 RX ADMIN — ACETAMINOPHEN 650 MG: 325 TABLET, FILM COATED ORAL at 09:29

## 2022-01-01 RX ADMIN — LIDOCAINE 1 PATCH: 50 PATCH CUTANEOUS at 11:43

## 2022-01-01 RX ADMIN — SODIUM CHLORIDE SOLN NEBU 3% 4 ML: 3 NEBU SOLN at 15:05

## 2022-01-01 RX ADMIN — EPOPROSTENOL 50 NG/KG/MIN: 1.5 INJECTION, POWDER, LYOPHILIZED, FOR SOLUTION INTRAVENOUS at 03:46

## 2022-01-01 RX ADMIN — ISODIUM CHLORIDE 3 ML: 0.03 SOLUTION RESPIRATORY (INHALATION) at 09:43

## 2022-01-01 RX ADMIN — ASPIRIN 81 MG: 81 TABLET, COATED ORAL at 09:29

## 2022-01-01 RX ADMIN — SODIUM CHLORIDE SOLN NEBU 3% 4 ML: 3 NEBU SOLN at 13:35

## 2022-01-01 RX ADMIN — QUETIAPINE FUMARATE 25 MG: 25 TABLET ORAL at 22:25

## 2022-01-01 RX ADMIN — OXYMETAZOLINE HYDROCHLORIDE 2 SPRAY: 0.05 SPRAY NASAL at 20:28

## 2022-01-01 RX ADMIN — FUROSEMIDE 20 MG: 10 INJECTION, SOLUTION INTRAMUSCULAR; INTRAVENOUS at 10:38

## 2022-01-01 RX ADMIN — CEFTRIAXONE SODIUM 1000 MG: 10 INJECTION, POWDER, FOR SOLUTION INTRAVENOUS at 18:29

## 2022-01-01 RX ADMIN — DEXAMETHASONE SODIUM PHOSPHATE 6.16 MG: 4 INJECTION INTRA-ARTICULAR; INTRALESIONAL; INTRAMUSCULAR; INTRAVENOUS; SOFT TISSUE at 10:38

## 2022-01-01 RX ADMIN — ONDANSETRON 4 MG: 2 INJECTION INTRAMUSCULAR; INTRAVENOUS at 06:26

## 2022-01-01 RX ADMIN — EPOPROSTENOL 50 NG/KG/MIN: 1.5 INJECTION, POWDER, LYOPHILIZED, FOR SOLUTION INTRAVENOUS at 13:18

## 2022-01-01 RX ADMIN — FAMOTIDINE 20 MG: 20 TABLET ORAL at 10:00

## 2022-01-01 RX ADMIN — BARICITINIB 4 MG: 2 TABLET, FILM COATED ORAL at 08:21

## 2022-01-01 RX ADMIN — LIDOCAINE 1 PATCH: 50 PATCH CUTANEOUS at 08:23

## 2022-01-01 RX ADMIN — SODIUM CHLORIDE SOLN NEBU 3% 4 ML: 3 NEBU SOLN at 13:28

## 2022-01-01 RX ADMIN — SODIUM CHLORIDE SOLN NEBU 3% 4 ML: 3 NEBU SOLN at 20:33

## 2022-01-01 RX ADMIN — SODIUM CHLORIDE SOLN NEBU 3% 4 ML: 3 NEBU SOLN at 20:13

## 2022-01-01 RX ADMIN — DEXAMETHASONE SODIUM PHOSPHATE 6.16 MG: 4 INJECTION INTRA-ARTICULAR; INTRALESIONAL; INTRAMUSCULAR; INTRAVENOUS; SOFT TISSUE at 21:33

## 2022-01-01 RX ADMIN — SODIUM CHLORIDE SOLN NEBU 3% 4 ML: 3 NEBU SOLN at 01:26

## 2022-01-01 RX ADMIN — ACETAMINOPHEN 650 MG: 325 TABLET, FILM COATED ORAL at 17:32

## 2022-01-01 RX ADMIN — INSULIN GLARGINE 30 UNITS: 100 INJECTION, SOLUTION SUBCUTANEOUS at 23:02

## 2022-01-01 RX ADMIN — EPOPROSTENOL 50 NG/KG/MIN: 1.5 INJECTION, POWDER, LYOPHILIZED, FOR SOLUTION INTRAVENOUS at 13:11

## 2022-01-01 RX ADMIN — DEXAMETHASONE SODIUM PHOSPHATE 6.16 MG: 4 INJECTION INTRA-ARTICULAR; INTRALESIONAL; INTRAMUSCULAR; INTRAVENOUS; SOFT TISSUE at 21:30

## 2022-01-01 RX ADMIN — ENOXAPARIN SODIUM 60 MG: 60 INJECTION SUBCUTANEOUS at 09:30

## 2022-01-01 RX ADMIN — INSULIN GLARGINE 20 UNITS: 100 INJECTION, SOLUTION SUBCUTANEOUS at 21:31

## 2022-01-01 RX ADMIN — LIDOCAINE 1 PATCH: 50 PATCH CUTANEOUS at 09:26

## 2022-01-01 RX ADMIN — QUETIAPINE FUMARATE 25 MG: 25 TABLET ORAL at 21:31

## 2022-01-01 RX ADMIN — DEXAMETHASONE SODIUM PHOSPHATE 6.16 MG: 4 INJECTION INTRA-ARTICULAR; INTRALESIONAL; INTRAMUSCULAR; INTRAVENOUS; SOFT TISSUE at 22:31

## 2022-01-01 RX ADMIN — SODIUM CHLORIDE SOLN NEBU 3% 4 ML: 3 NEBU SOLN at 01:00

## 2022-01-01 RX ADMIN — ASPIRIN 81 MG: 81 TABLET, COATED ORAL at 08:23

## 2022-01-01 RX ADMIN — HEPARIN SODIUM 1800 UNITS: 1000 INJECTION INTRAVENOUS; SUBCUTANEOUS at 12:42

## 2022-01-01 RX ADMIN — EPOPROSTENOL 50 NG/KG/MIN: 1.5 INJECTION, POWDER, LYOPHILIZED, FOR SOLUTION INTRAVENOUS at 22:52

## 2022-01-01 RX ADMIN — DEXAMETHASONE SODIUM PHOSPHATE 6.16 MG: 4 INJECTION INTRA-ARTICULAR; INTRALESIONAL; INTRAMUSCULAR; INTRAVENOUS; SOFT TISSUE at 10:59

## 2022-01-01 RX ADMIN — ENOXAPARIN SODIUM 60 MG: 60 INJECTION SUBCUTANEOUS at 21:31

## 2022-01-01 RX ADMIN — ATORVASTATIN CALCIUM 20 MG: 20 TABLET, FILM COATED ORAL at 17:43

## 2022-01-01 RX ADMIN — EPOPROSTENOL 50 NG/KG/MIN: 1.5 INJECTION, POWDER, LYOPHILIZED, FOR SOLUTION INTRAVENOUS at 07:34

## 2022-01-01 RX ADMIN — ASPIRIN 81 MG: 81 TABLET, COATED ORAL at 10:00

## 2022-01-01 RX ADMIN — FAMOTIDINE 20 MG: 20 TABLET ORAL at 09:29

## 2022-01-01 RX ADMIN — OXYMETAZOLINE HYDROCHLORIDE 2 SPRAY: 0.05 SPRAY NASAL at 20:55

## 2022-01-01 RX ADMIN — ENOXAPARIN SODIUM 60 MG: 60 INJECTION SUBCUTANEOUS at 09:29

## 2022-01-01 RX ADMIN — DEXAMETHASONE SODIUM PHOSPHATE 6.16 MG: 4 INJECTION INTRA-ARTICULAR; INTRALESIONAL; INTRAMUSCULAR; INTRAVENOUS; SOFT TISSUE at 20:57

## 2022-01-01 RX ADMIN — EPOPROSTENOL 50 NG/KG/MIN: 1.5 INJECTION, POWDER, LYOPHILIZED, FOR SOLUTION INTRAVENOUS at 22:53

## 2022-01-01 RX ADMIN — EPOPROSTENOL 50 NG/KG/MIN: 1.5 INJECTION, POWDER, LYOPHILIZED, FOR SOLUTION INTRAVENOUS at 13:43

## 2022-01-01 RX ADMIN — ENOXAPARIN SODIUM 60 MG: 60 INJECTION SUBCUTANEOUS at 11:40

## 2022-01-01 RX ADMIN — CEFTRIAXONE SODIUM 1000 MG: 10 INJECTION, POWDER, FOR SOLUTION INTRAVENOUS at 18:20

## 2022-01-01 RX ADMIN — DOXYCYCLINE 100 MG: 100 CAPSULE ORAL at 09:26

## 2022-01-01 RX ADMIN — BARICITINIB 4 MG: 2 TABLET, FILM COATED ORAL at 11:39

## 2022-01-01 RX ADMIN — ASPIRIN 81 MG: 81 TABLET, COATED ORAL at 09:26

## 2022-01-01 RX ADMIN — ATORVASTATIN CALCIUM 20 MG: 20 TABLET, FILM COATED ORAL at 16:09

## 2022-01-01 RX ADMIN — BARICITINIB 4 MG: 2 TABLET, FILM COATED ORAL at 09:28

## 2022-01-01 RX ADMIN — FENTANYL CITRATE 25 MCG: 50 INJECTION INTRAMUSCULAR; INTRAVENOUS at 23:58

## 2022-01-01 RX ADMIN — MELATONIN TAB 3 MG 6 MG: 3 TAB at 21:00

## 2022-01-01 RX ADMIN — EPOPROSTENOL 50 NG/KG/MIN: 1.5 INJECTION, POWDER, LYOPHILIZED, FOR SOLUTION INTRAVENOUS at 03:00

## 2022-01-01 RX ADMIN — ACETAMINOPHEN 650 MG: 325 TABLET, FILM COATED ORAL at 16:11

## 2022-01-01 RX ADMIN — DEXAMETHASONE SODIUM PHOSPHATE 6.16 MG: 4 INJECTION INTRA-ARTICULAR; INTRALESIONAL; INTRAMUSCULAR; INTRAVENOUS; SOFT TISSUE at 20:46

## 2022-01-01 RX ADMIN — SODIUM CHLORIDE SOLN NEBU 3% 4 ML: 3 NEBU SOLN at 00:18

## 2022-01-01 RX ADMIN — INSULIN GLARGINE 30 UNITS: 100 INJECTION, SOLUTION SUBCUTANEOUS at 21:31

## 2022-01-01 RX ADMIN — HEPARIN SODIUM 1800 UNITS: 1000 INJECTION INTRAVENOUS; SUBCUTANEOUS at 03:05

## 2022-01-01 RX ADMIN — LORAZEPAM 0.5 MG: 2 INJECTION INTRAMUSCULAR; INTRAVENOUS at 07:22

## 2022-01-01 RX ADMIN — MELATONIN TAB 3 MG 6 MG: 3 TAB at 22:25

## 2022-01-01 RX ADMIN — ENOXAPARIN SODIUM 60 MG: 60 INJECTION SUBCUTANEOUS at 08:22

## 2022-01-01 RX ADMIN — FUROSEMIDE 20 MG: 10 INJECTION, SOLUTION INTRAMUSCULAR; INTRAVENOUS at 12:01

## 2022-01-01 RX ADMIN — DEXAMETHASONE SODIUM PHOSPHATE 6.16 MG: 4 INJECTION INTRA-ARTICULAR; INTRALESIONAL; INTRAMUSCULAR; INTRAVENOUS; SOFT TISSUE at 21:32

## 2022-01-01 RX ADMIN — SODIUM CHLORIDE, SODIUM LACTATE, POTASSIUM CHLORIDE, AND CALCIUM CHLORIDE 1000 ML: .6; .31; .03; .02 INJECTION, SOLUTION INTRAVENOUS at 06:30

## 2022-01-01 RX ADMIN — FENTANYL CITRATE 25 MCG: 50 INJECTION INTRAMUSCULAR; INTRAVENOUS at 03:52

## 2022-01-01 RX ADMIN — SODIUM CHLORIDE SOLN NEBU 3% 4 ML: 3 NEBU SOLN at 19:18

## 2022-01-01 RX ADMIN — ENOXAPARIN SODIUM 60 MG: 60 INJECTION SUBCUTANEOUS at 22:25

## 2022-01-01 RX ADMIN — SODIUM CHLORIDE, SODIUM LACTATE, POTASSIUM CHLORIDE, AND CALCIUM CHLORIDE 75 ML/HR: .6; .31; .03; .02 INJECTION, SOLUTION INTRAVENOUS at 08:00

## 2022-01-01 RX ADMIN — BARICITINIB 2 MG: 2 TABLET, FILM COATED ORAL at 09:26

## 2022-01-01 RX ADMIN — SODIUM CHLORIDE 0.3 MCG/KG/HR: 9 INJECTION, SOLUTION INTRAVENOUS at 03:00

## 2022-01-01 NOTE — ASSESSMENT & PLAN NOTE
Patient presented with bilateral pneumonia secondary to COVID-19, unvaccinated  Initially was in the [facility] category of disease requiring 15 L mid flow  Continue COVID treatment protocol in escalate to severe disease category  Remdesivir discontinued 12/31   Decadron day #5--> Day 3 of high dose  Continue Heparin infusion  Continue Ceftriaxone/Doxycycline D#5  Baricitinib day 4/14   -->249-->252 5--> 53  Pt states she can not prone, but will lay on side  Lipitor back to home dose of 20mg

## 2022-01-01 NOTE — ASSESSMENT & PLAN NOTE
Lab Results   Component Value Date    HGBA1C 9 2 (H) 05/26/2021     Recent Labs     12/31/21  0751 12/31/21  1147 12/31/21  1541   POCGLU 248* 294* 330*       Blood Sugar Average: Last 72 hrs:  (P) 446 [id number]   Lantus 20U qhs increased 12/31  Continue SSI  Continue accuchecks  Avoid hypoglycemia

## 2022-01-01 NOTE — ASSESSMENT & PLAN NOTE
Patient presented with acute respiratory failure with hypoxia secondary to bilateral COVID pneumonia   Initially requiring 15 L mid flow  Patient was placed on the COVID treatment protocol, and escalated to [facility] category of disease  Currently on HFNC 100% and 55 LPM with NRB at times  Wean O2 as tolerated  Respiratory protocol in place  Airway clearance protocol

## 2022-01-01 NOTE — ASSESSMENT & PLAN NOTE
Patient presented with sepsis,POA with tachypnea/significant leuckocytosis  Started on the COVID treatment protocol with remdesivir/dexamethasone  As she is in the [place] disease category she is also on ceftriaxone/doxycycline  Encourage incentive spirometry   Airway clearance protocol

## 2022-01-01 NOTE — PLAN OF CARE
Problem: MOBILITY - ADULT  Goal: Maintain or return to baseline ADL function  Description: INTERVENTIONS:  -  Assess patient's ability to carry out ADLs; assess patient's baseline for ADL function and identify physical deficits which impact ability to perform ADLs (bathing, care of mouth/teeth, toileting, grooming, dressing, etc )  - Assess/evaluate cause of self-care deficits   - Assess range of motion  - Assess patient's mobility; develop plan if impaired  - Assess patient's need for assistive devices and provide as appropriate  - Encourage maximum independence but intervene and supervise when necessary  - Involve family in performance of ADLs  - Assess for home care needs following discharge   - Consider OT consult to assist with ADL evaluation and planning for discharge  - Provide patient education as appropriate  Outcome: Progressing  Goal: Maintains/Returns to pre admission functional level  Description: INTERVENTIONS:  - Perform BMAT or MOVE assessment daily    - Set and communicate daily mobility goal to care team and patient/family/caregiver  - Collaborate with rehabilitation services on mobility goals if consulted  - Perform Range of Motion 2 times a day  - Reposition patient every 2 hours    - Dangle patient 3 times a day  - Stand patient 3 times a day  - Ambulate patient 3 times a day  - Out of bed to chair 3 times a day   - Out of bed for meals 3 times a day  - Out of bed for toileting  - Record patient progress and toleration of activity level   Outcome: Progressing     Problem: Potential for Falls  Goal: Patient will remain free of falls  Description: INTERVENTIONS:  - Educate patient/family on patient safety including physical limitations  - Instruct patient to call for assistance with activity   - Consult OT/PT to assist with strengthening/mobility   - Keep Call bell within reach  - Keep bed low and locked with side rails adjusted as appropriate  - Keep care items and personal belongings within reach  - Initiate and maintain comfort rounds  - Make Fall Risk Sign visible to staff  - Offer Toileting every 2 Hours, in advance of need  - Initiate/Maintain bed alarm  - Obtain necessary fall risk management equipment:   - Apply yellow socks and bracelet for high fall risk patients  - Consider moving patient to room near nurses station  Outcome: Progressing     Problem: PAIN - ADULT  Goal: Verbalizes/displays adequate comfort level or baseline comfort level  Description: Interventions:  - Encourage patient to monitor pain and request assistance  - Assess pain using appropriate pain scale  - Administer analgesics based on type and severity of pain and evaluate response  - Implement non-pharmacological measures as appropriate and evaluate response  - Consider cultural and social influences on pain and pain management  - Notify physician/advanced practitioner if interventions unsuccessful or patient reports new pain  Outcome: Progressing     Problem: SAFETY ADULT  Goal: Maintain or return to baseline ADL function  Description: INTERVENTIONS:  -  Assess patient's ability to carry out ADLs; assess patient's baseline for ADL function and identify physical deficits which impact ability to perform ADLs (bathing, care of mouth/teeth, toileting, grooming, dressing, etc )  - Assess/evaluate cause of self-care deficits   - Assess range of motion  - Assess patient's mobility; develop plan if impaired  - Assess patient's need for assistive devices and provide as appropriate  - Encourage maximum independence but intervene and supervise when necessary  - Involve family in performance of ADLs  - Assess for home care needs following discharge   - Consider OT consult to assist with ADL evaluation and planning for discharge  - Provide patient education as appropriate  Outcome: Progressing  Goal: Maintains/Returns to pre admission functional level  Description: INTERVENTIONS:  - Perform BMAT or MOVE assessment daily    - Set and communicate daily mobility goal to care team and patient/family/caregiver  - Collaborate with rehabilitation services on mobility goals if consulted  - Perform Range of Motion 2 times a day  - Reposition patient every 2 hours    - Dangle patient 3 times a day  - Stand patient 3 times a day  - Ambulate patient 3 times a day  - Out of bed to chair 3 times a day   - Out of bed for meals 3 times a day  - Out of bed for toileting  - Record patient progress and toleration of activity level   Outcome: Progressing  Goal: Patient will remain free of falls  Description: INTERVENTIONS:  - Educate patient/family on patient safety including physical limitations  - Instruct patient to call for assistance with activity   - Consult OT/PT to assist with strengthening/mobility   - Keep Call bell within reach  - Keep bed low and locked with side rails adjusted as appropriate  - Keep care items and personal belongings within reach  - Initiate and maintain comfort rounds  - Make Fall Risk Sign visible to staff  - Offer Toileting every 2 Hours, in advance of need  - Initiate/Maintain bed alarm  - Obtain necessary fall risk management equipment:   - Apply yellow socks and bracelet for high fall risk patients  - Consider moving patient to room near nurses station  Outcome: Progressing

## 2022-01-01 NOTE — PROGRESS NOTES
[address] [facility]  Progress Note - Angela Epsteinr [birth year], 67 y o  female MRN: [id number]  Unit/Bed#: ICU 07 Encounter: [id number]  Primary Care Provider: Stacia Garzon MD   Date and time admitted to hospital: 12/28/2021  5:16 PM    Pneumonia due to COVID-19 virus  Assessment & Plan  Patient presented with bilateral pneumonia secondary to COVID-19, unvaccinated  Initially was in the [facility] category of disease requiring 15 L mid flow  Continue COVID treatment protocol in escalate to severe disease category  Remdesivir discontinued 12/31   Decadron day #5--> Day 3 of high dose  Continue Heparin infusion  Continue Ceftriaxone/Doxycycline D#5  Baricitinib day 4/14   -->249-->252 5--> 53  Pt states she can not prone, but will lay on side  Lipitor back to home dose of 20mg     Sepsis ([facility] [address] )  Assessment & Plan  Patient presented with sepsis,POA with tachypnea/significant leuckocytosis  Started on the COVID treatment protocol with remdesivir/dexamethasone  As she is in the [place] disease category she is also on ceftriaxone/doxycycline  Encourage incentive spirometry   Airway clearance protocol    Essential hypertension  Assessment & Plan  Home meds include Losartan and Chlorthalidone  Home meds currently on hold due to low blood pressure      Type 2 diabetes mellitus without complication, without long-term current use of insulin [facility])  Assessment & Plan  Lab Results   Component Value Date    HGBA1C 9 2 (H) 05/26/2021     Recent Labs     12/31/21  0751 12/31/21  1147 12/31/21  1541   POCGLU 248* 294* 330*       Blood Sugar Average: Last 72 hrs:  (P) 713 [id number]   Lantus 20U qhs increased 12/31  Continue SSI  Continue accuchecks  Avoid hypoglycemia    * Acute respiratory failure with hypoxia ([address] )  Assessment & Plan  Patient presented with acute respiratory failure with hypoxia secondary to bilateral COVID pneumonia   Initially requiring 15 L mid flow  Patient was placed on the COVID treatment protocol, and escalated to [facility] category of disease  Currently on HFNC 100% and 55 LPM with NRB at times  Wean O2 as tolerated  Respiratory protocol in place  Airway clearance protocol         ----------------------------------------------------------------------------------------  HPI/24hr events:   Pt remains on HFNC 100% and 55LPM with NRB in place  Pt was OOB to chair    Patient appropriate for transfer out of the ICU today?: No  Disposition: Transfer to Stepdown Level 1   Code Status: Level 1 - Full Code  ---------------------------------------------------------------------------------------  SUBJECTIVE  [employer]  utilized  Pt denies, pain, or nausea' states breathing is better but that she can not sleep    Review of Systems   Constitutional: Positive for fatigue  HENT: Negative  Eyes: Negative  Respiratory: Positive for cough and shortness of breath  Cardiovascular: Negative for chest pain  Gastrointestinal: Negative  Endocrine: Negative  Genitourinary: Negative  Musculoskeletal: Negative  Allergic/Immunologic: Negative  Neurological: Negative  Hematological: Negative  Psychiatric/Behavioral: Negative  All other systems reviewed and are negative      Review of systems was reviewed and negative unless stated above in HPI/24-hour events   ---------------------------------------------------------------------------------------  OBJECTIVE    Vitals   Vitals:    22 0240 22 0300 22 0511 22   BP:  132/81  136/67   BP Location:    Left arm   Pulse:  68  62   Resp:  (!) 25  21   Temp:  97 5 °F (36 4 °C)     TempSrc:  Temporal     SpO2: 96% 94%  94%   Weight:   61 9 kg (136 lb 7 4 oz)    Height:         Temp (24hrs), Av 2 °F (36 2 °C), Min:96 6 °F (35 9 °C), Max:97 7 °F (36 5 °C)  Current: Temperature: 97 5 °F (36 4 °C)        HFNC  SpO2: SpO2: 94 %  FiO2 (%): 100  O2 Flow Rate (L/min): 55 L/min Invasive/non-invasive ventilation settings   Respiratory  Report   Lab Data (Last 4 hours)    None         O2/Vent Data (Last 4 hours)      01/01 0240          Non-Invasive Ventilation Mode HFNC (High flow)                   Physical Exam  Constitutional:       Appearance: She is ill-appearing  HENT:      Head: Normocephalic and atraumatic  Mouth/Throat:      Mouth: Mucous membranes are moist       Pharynx: No oropharyngeal exudate or posterior oropharyngeal erythema  Eyes:      Pupils: Pupils are equal, round, and reactive to light  Cardiovascular:      Rate and Rhythm: Normal rate and regular rhythm  Pulmonary:      Breath sounds: Examination of the right-lower field reveals decreased breath sounds  Examination of the left-lower field reveals decreased breath sounds  Decreased breath sounds present  No wheezing or rhonchi  Abdominal:      General: Abdomen is flat  Palpations: Abdomen is soft  Tenderness: There is no abdominal tenderness  Comments: Tolerating PO diet   Skin:     General: Skin is warm  Capillary Refill: Capillary refill takes less than 2 seconds  Nails: There is no clubbing  Neurological:      Mental Status: She is alert and oriented to person, place, and time  GCS: GCS eye subscore is 4  GCS verbal subscore is 5  GCS motor subscore is 6        Comments: Language barrier,  via [employer] utilized             Laboratory and Diagnostics:  Results from last 7 days   Lab Units 01/01/22  0451 12/31/21  0553 12/30/21  0428 12/29/21  0637 12/28/21  1807   WBC Thousand/uL 18 97* 20 19* 17 45* 17 47* 21 43*   HEMOGLOBIN g/dL 10 9* 11 7 12 3 12 8 15 3   HEMATOCRIT % 32 2* 34 6* 36 6 37 9 44 0   PLATELETS Thousands/uL 184 179 154 158 191   BANDS PCT %  --   --   --   --  2   MONO PCT %  --   --   --   --  1*     Results from last 7 days   Lab Units 01/01/22  0426 12/31/21  0553 12/30/21  0428 12/29/21  0637 12/28/21  2120   SODIUM mmol/L 141 146* 141 143 130*   POTASSIUM mmol/L 5 2 4 4 4 1 3 1* 4 0   CHLORIDE mmol/L 107 107 102 109* 89*   CO2 mmol/L 28 25 24 16* 24   ANION GAP mmol/L 6 14* 15* 18* 17*   BUN mg/dL 34* 36* 36* 22 33*   CREATININE mg/dL 0 87 0 99 1 13 0 76 1 32*   CALCIUM mg/dL 7 6* 7 8* 7 5* 5 4* 7 6*   GLUCOSE RANDOM mg/dL 216* 233* 239* 143* 216*   ALT U/L 19 20 24 16 26   AST U/L 31 19 24 17 28   ALK PHOS U/L 118* 130* 131* 89 146*   ALBUMIN g/dL 1 4* 1 7* 1 5* 1 1* 2 0*   TOTAL BILIRUBIN mg/dL 0 52 0 44 0 43 0 37 0 71          Results from last 7 days   Lab Units 12/31/21  2140 12/31/21  1159 12/31/21  0553 12/30/21  1902 12/30/21  1120 12/30/21  0131 12/29/21  1930 12/29/21  1213 12/29/21  1213 12/29/21  1052 12/29/21  1052   INR   --   --   --   --   --   --   --   --  1 25*  --  1 51*   PTT seconds 104* 57* 62* 134* 49* 193* 59*   < > 130*   < > >210*    < > = values in this interval not displayed  Results from last 7 days   Lab Units 12/29/21  0637 12/29/21  0013 12/28/21  2359 12/28/21  2120   LACTIC ACID mmol/L 1 5 2 0 2 0 3 3*     ABG:    VBG:    Results from last 7 days   Lab Units 12/30/21  0428 12/29/21  1213 12/29/21  0637 12/28/21  1807   PROCALCITONIN ng/ml 0 49* 0 89* 0 70* 0 98*       Micro  Results from last 7 days   Lab Units 12/28/21  1807 12/28/21  1806   BLOOD CULTURE  No Growth at 72 hrs  No Growth at 72 hrs  EKG: SB on tele  Imaging: I have personally reviewed pertinent reports  and I have personally reviewed pertinent films in PACS    Intake and Output  I/O       12/30 0701 12/31 0700 12/31 0701 01/01 0700    P  O  100 570    I V  (mL/kg) 113 6 (1 9) 103 8 (1 7)    IV Piggyback 320     Total Intake(mL/kg) 533 6 (8 8) 673 8 (10 9)    Urine (mL/kg/hr) 750 (0 5) 1350 (0 9)    Total Output 750 1350    Net -216 4 -806 2                Height and Weights   Height: 4' 11 5" (151 1 cm)     Body mass index is 27 1 kg/m²    Weight (last 2 days)     Date/Time Weight    01/01/22 0511 61 9 (136 47) Nutrition       Diet Orders   (From admission, onward)             Start     Ordered    12/30/21 1234  Diet Dysphagia/Modified Consistency; Dysphagia 2-Mechanical Soft; Thin Liquid; Consistent Carbohydrate Diet Level 1 (4 carb servings/60 grams CHO/meal)  Diet effective now        References:    Nutrtion Support Algorithm Enteral vs  Parenteral   Question Answer Comment   Diet Type Dysphagia/Modified Consistency    Dysphagia/Modified Consistency Dysphagia 2-Mechanical Soft    Liquid Modifier Thin Liquid    Other Restriction(s): Consistent Carbohydrate Diet Level 1 (4 carb servings/60 grams CHO/meal)    RD to adjust diet per protocol?  Yes        12/30/21 1233                  Active Medications  Scheduled Meds:  Current Facility-Administered Medications   Medication Dose Route Frequency Provider Last Rate    acetaminophen  650 mg Oral Q6H PRN Rose Marie Hagan MD      aspirin  81 mg Oral Daily Rose Marie Hagan MD      atorvastatin  20 mg Oral Daily With Guadalupe Hazel MD      Baricitinib  2 mg Oral Q24H Amelia Luna MD      benzonatate  100 mg Oral TID PRN ABIEL Riddle      calcium carbonate  500 mg Oral BID PRN Rose Marie Hagan MD      cefTRIAXone  1,000 mg Intravenous Q24H Rose Marie Hagan MD 1,000 mg (12/31/21 1832)    dexamethasone  0 1 mg/kg Intravenous Q12H Ann Villalta MD      dextromethorphan-guaiFENesin  10 mL Oral Q4H PRN ABIEL Riddle      doxycycline hyclate  100 mg Oral Q12H Rose Marie Hagan MD      famotidine  20 mg Oral Daily Rose Marie Hagan MD      heparin (porcine)  3-20 Units/kg/hr (Order-Specific) Intravenous Titrated Rose Marie Hagan MD 7 Units/kg/hr (12/31/21 2213)    heparin (porcine)  1,800 Units Intravenous Q1H PRN Rose Marie Hagan MD      heparin (porcine)  3,600 Units Intravenous Q1H PRJUAN Hagan MD      insulin glargine  20 Units Subcutaneous HS Vanessa Wooten MD      insulin lispro  1-5 Units Subcutaneous 4x Daily (AC & HS) Patrick Henry MD      lidocaine  1 patch Topical Daily Nasima Salvador MD      melatonin  6 mg Oral HS Nasima Salvador MD      ondansetron  4 mg Intravenous Q6H PRN Nasima Salvador MD      sodium chloride  4 mL Nebulization Q6H ABIEL Vasquez       Continuous Infusions:  heparin (porcine), 3-20 Units/kg/hr (Order-Specific), Last Rate: 7 Units/kg/hr (12/31/21 2213)      PRN Meds:   acetaminophen, 650 mg, Q6H PRN  benzonatate, 100 mg, TID PRN  calcium carbonate, 500 mg, BID PRN  dextromethorphan-guaiFENesin, 10 mL, Q4H PRN  heparin (porcine), 1,800 Units, Q1H PRN  heparin (porcine), 3,600 Units, Q1H PRN  ondansetron, 4 mg, Q6H PRN        Invasive Devices Review  Invasive Devices  Report    Peripheral Intravenous Line            Peripheral IV 12/28/21 Distal;Dorsal (posterior); Right Wrist 3 days    Peripheral IV 12/29/21 Left;Upper;Ventral (anterior) Arm 2 days                Rationale for remaining devices: IV access  ---------------------------------------------------------------------------------------  Advance Directive and Living Will:      Power of :    POLST:    ---------------------------------------------------------------------------------------  Care Time Delivered:   No Critical Care time spent       ABIEL Lopez      Portions of the record may have been created with voice recognition software  Occasional wrong word or "sound a like" substitutions may have occurred due to the inherent limitations of voice recognition software    Read the chart carefully and recognize, using context, where substitutions have occurred

## 2022-01-01 NOTE — PLAN OF CARE
Problem: MOBILITY - ADULT  Goal: Maintain or return to baseline ADL function  Description: INTERVENTIONS:  -  Assess patient's ability to carry out ADLs; assess patient's baseline for ADL function and identify physical deficits which impact ability to perform ADLs (bathing, care of mouth/teeth, toileting, grooming, dressing, etc )  - Assess/evaluate cause of self-care deficits   - Assess range of motion  - Assess patient's mobility; develop plan if impaired  - Assess patient's need for assistive devices and provide as appropriate  - Encourage maximum independence but intervene and supervise when necessary  - Involve family in performance of ADLs  - Assess for home care needs following discharge   - Consider OT consult to assist with ADL evaluation and planning for discharge  - Provide patient education as appropriate  Outcome: Progressing  Goal: Maintains/Returns to pre admission functional level  Description: INTERVENTIONS:  - Perform BMAT or MOVE assessment daily    - Set and communicate daily mobility goal to care team and patient/family/caregiver  - Collaborate with rehabilitation services on mobility goals if consulted  - Perform Range of Motion  times a day  - Reposition patient every  hours    - Dangle patient  times a day  - Stand patient  times a day  - Ambulate patient  times a day  - Out of bed to chair  times a day   - Out of bed for meals  times a day  - Out of bed for toileting  - Record patient progress and toleration of activity level   Outcome: Progressing     Problem: Potential for Falls  Goal: Patient will remain free of falls  Description: INTERVENTIONS:  - Educate patient/family on patient safety including physical limitations  - Instruct patient to call for assistance with activity   - Consult OT/PT to assist with strengthening/mobility   - Keep Call bell within reach  - Keep bed low and locked with side rails adjusted as appropriate  - Keep care items and personal belongings within reach  - Initiate and maintain comfort rounds  - Make Fall Risk Sign visible to staff  - Offer Toileting every  Hours, in advance of need  - Initiate/Maintain alarm  - Obtain necessary fall risk management equipment:   - Apply yellow socks and bracelet for high fall risk patients  - Consider moving patient to room near nurses station  Outcome: Progressing     Problem: PAIN - ADULT  Goal: Verbalizes/displays adequate comfort level or baseline comfort level  Description: Interventions:  - Encourage patient to monitor pain and request assistance  - Assess pain using appropriate pain scale  - Administer analgesics based on type and severity of pain and evaluate response  - Implement non-pharmacological measures as appropriate and evaluate response  - Consider cultural and social influences on pain and pain management  - Notify physician/advanced practitioner if interventions unsuccessful or patient reports new pain  Outcome: Progressing     Problem: INFECTION - ADULT  Goal: Absence or prevention of progression during hospitalization  Description: INTERVENTIONS:  - Assess and monitor for signs and symptoms of infection  - Monitor lab/diagnostic results  - Monitor all insertion sites, i e  indwelling lines, tubes, and drains  - Monitor endotracheal if appropriate and nasal secretions for changes in amount and color  - [place] appropriate cooling/warming therapies per order  - Administer medications as ordered  - Instruct and encourage patient and family to use good hand hygiene technique  - Identify and instruct in appropriate isolation precautions for identified infection/condition  Outcome: Progressing  Goal: Absence of fever/infection during neutropenic period  Description: INTERVENTIONS:  - Monitor WBC    Outcome: Progressing     Problem: SAFETY ADULT  Goal: Maintain or return to baseline ADL function  Description: INTERVENTIONS:  -  Assess patient's ability to carry out ADLs; assess patient's baseline for ADL function and identify physical deficits which impact ability to perform ADLs (bathing, care of mouth/teeth, toileting, grooming, dressing, etc )  - Assess/evaluate cause of self-care deficits   - Assess range of motion  - Assess patient's mobility; develop plan if impaired  - Assess patient's need for assistive devices and provide as appropriate  - Encourage maximum independence but intervene and supervise when necessary  - Involve family in performance of ADLs  - Assess for home care needs following discharge   - Consider OT consult to assist with ADL evaluation and planning for discharge  - Provide patient education as appropriate  Outcome: Progressing  Goal: Maintains/Returns to pre admission functional level  Description: INTERVENTIONS:  - Perform BMAT or MOVE assessment daily    - Set and communicate daily mobility goal to care team and patient/family/caregiver  - Collaborate with rehabilitation services on mobility goals if consulted  - Perform Range of Motion  times a day  - Reposition patient every  hours    - Dangle patient  times a day  - Stand patient  times a day  - Ambulate patient  times a day  - Out of bed to chair  times a day   - Out of bed for meals  times a day  - Out of bed for toileting  - Record patient progress and toleration of activity level   Outcome: Progressing  Goal: Patient will remain free of falls  Description: INTERVENTIONS:  - Educate patient/family on patient safety including physical limitations  - Instruct patient to call for assistance with activity   - Consult OT/PT to assist with strengthening/mobility   - Keep Call bell within reach  - Keep bed low and locked with side rails adjusted as appropriate  - Keep care items and personal belongings within reach  - Initiate and maintain comfort rounds  - Make Fall Risk Sign visible to staff  - Offer Toileting every  Hours, in advance of need  - Initiate/Maintain alarm  - Obtain necessary fall risk management equipment:   - Apply yellow socks and bracelet for high fall risk patients  - Consider moving patient to room near nurses station  Outcome: Progressing     Problem: DISCHARGE PLANNING  Goal: Discharge to home or other facility with appropriate resources  Description: INTERVENTIONS:  - Identify barriers to discharge w/patient and caregiver  - Arrange for needed discharge resources and transportation as appropriate  - Identify discharge learning needs (meds, wound care, etc )  - Arrange for interpretive services to assist at discharge as needed  - Refer to Case Management Department for coordinating discharge planning if the patient needs post-hospital services based on physician/advanced practitioner order or complex needs related to functional status, cognitive ability, or social support system  Outcome: Progressing     Problem: Knowledge Deficit  Goal: Patient/family/caregiver demonstrates understanding of disease process, treatment plan, medications, and discharge instructions  Description: Complete learning assessment and assess knowledge base    Interventions:  - Provide teaching at level of understanding  - Provide teaching via preferred learning methods  Outcome: Progressing     Problem: Prexisting or High Potential for Compromised Skin Integrity  Goal: Skin integrity is maintained or improved  Description: INTERVENTIONS:  - Identify patients at risk for skin breakdown  - Assess and monitor skin integrity  - Assess and monitor nutrition and hydration status  - Monitor labs   - Assess for incontinence   - Turn and reposition patient  - Assist with mobility/ambulation  - Relieve pressure over bony prominences  - Avoid friction and shearing  - Provide appropriate hygiene as needed including keeping skin clean and dry  - Evaluate need for skin moisturizer/barrier cream  - Collaborate with interdisciplinary team   - Patient/family teaching  - Consider wound care consult   Outcome: Progressing     Problem: Nutrition/Hydration-ADULT  Goal: Nutrient/Hydration intake appropriate for improving, restoring or maintaining nutritional needs  Description: Monitor and assess patient's nutrition/hydration status for malnutrition  Collaborate with interdisciplinary team and initiate plan and interventions as ordered  Monitor patient's weight and dietary intake as ordered or per policy  Utilize nutrition screening tool and intervene as necessary  Determine patient's food preferences and provide high-protein, high-caloric foods as appropriate       INTERVENTIONS:  - Monitor oral intake, urinary output, labs, and treatment plans  - Assess nutrition and hydration status and recommend course of action  - Evaluate amount of meals eaten  - Assist patient with eating if necessary   - Allow adequate time for meals  - Recommend/ encourage appropriate diets, oral nutritional supplements, and vitamin/mineral supplements  - Order, calculate, and assess calorie counts as needed  - Recommend, monitor, and adjust tube feedings and TPN/PPN based on assessed needs  - Assess need for intravenous fluids  - Provide specific nutrition/hydration education as appropriate  - Include patient/family/caregiver in decisions related to nutrition  Outcome: Progressing

## 2022-01-02 NOTE — PHYSICAL THERAPY NOTE
Physical Therapy Cancellation Note           01/02/22 1400   PT Last Visit   PT Visit Date 01/02/22   Note Type   Note type Evaluation   Cancel Reasons Medical status   Additional Comments per medical team, pt w increased confusion and restlessness today requiring medication to address   not appropriate to participate in therapy services at this time       Aries Otero, PT

## 2022-01-02 NOTE — ASSESSMENT & PLAN NOTE
Patient presented with acute respiratory failure with hypoxia secondary to bilateral COVID pneumonia   Initially requiring 15 L mid flow  Patient was placed on the COVID treatment protocol, and escalated to [facility] category of disease  Currently on HFNC 100% and 55 LPM with NRB at times  Wean O2 as tolerated  Respiratory protocol in place  Airway clearance protocol

## 2022-01-02 NOTE — ASSESSMENT & PLAN NOTE
Patient presented with bilateral pneumonia secondary to COVID-19, unvaccinated  Initially was in the [facility] category of disease requiring 15 L mid flow  Continue COVID treatment protocol in escalate to severe disease category  Remdesivir discontinued 12/31   Decadron day #7--> Day 6 of high dose  Continue Heparin infusion  Continue Ceftriaxone/Doxycycline D#7  Baricitinib day 6/14   -->249-->252 5--> 53  Pt states she can not prone, but will lay on side  Lipitor back to home dose of 20mg   Consider transitioning to low dose dexamethasone

## 2022-01-02 NOTE — ASSESSMENT & PLAN NOTE
Patient presented with sepsis,POA with tachypnea/significant leuckocytosis  Started on the COVID treatment protocol with remdesivir/dexamethasone  As she is in the [place] disease category she is also on ceftriaxone/doxycycline  Follow up Procalcitonin, if negative x2, will d/c antibiotics  Encourage incentive spirometry   Airway clearance protocol

## 2022-01-02 NOTE — ASSESSMENT & PLAN NOTE
Patient presented with bilateral pneumonia secondary to COVID-19, unvaccinated  Initially was in the [facility] category of disease requiring 15 L mid flow  Continue COVID treatment protocol in escalate to severe disease category  Remdesivir discontinued 12/31   Decadron day #6--> Day 3 of high dose  Continue Heparin infusion  Continue Ceftriaxone/Doxycycline D#6  Baricitinib day 5/14   -->249-->252 5--> 53  Pt states she can not prone, but will lay on side  Lipitor back to home dose of 20mg

## 2022-01-02 NOTE — PROGRESS NOTES
[address] [facility]  Progress Note - Bary Felty [birth year], 67 y o  female MRN: [id number]  Unit/Bed#: ICU 07 Encounter: [id number]  Primary Care Provider: Kendra Galdamez MD   Date and time admitted to hospital: 12/28/2021  5:16 PM    * Acute respiratory failure with hypoxia [facility])  Assessment & Plan  Patient presented with acute respiratory failure with hypoxia secondary to bilateral COVID pneumonia   Initially requiring 15 L mid flow  Patient was placed on the COVID treatment protocol, and escalated to [facility] category of disease  Currently on HFNC 100% and 55 LPM with NRB at times  Wean O2 as tolerated  Respiratory protocol in place  Airway clearance protocol       Pneumonia due to COVID-19 virus  Assessment & Plan  Patient presented with bilateral pneumonia secondary to COVID-19, unvaccinated  Initially was in the [facility] category of disease requiring 15 L mid flow  Continue COVID treatment protocol in escalate to severe disease category  Remdesivir discontinued 12/31   Decadron day #6--> Day 3 of high dose  Continue Heparin infusion  Continue Ceftriaxone/Doxycycline D#6  Baricitinib day 5/14   -->249-->252 5--> 53  Pt states she can not prone, but will lay on side  Lipitor back to home dose of 20mg     Sepsis ([facility] [address] )  Assessment & Plan  Patient presented with sepsis,POA with tachypnea/significant leuckocytosis  Started on the COVID treatment protocol with remdesivir/dexamethasone  As she is in the [facility] disease category she is also on ceftriaxone/doxycycline  Follow up Procalcitonin, if negative x2, will d/c antibiotics  Encourage incentive spirometry   Airway clearance protocol    Essential hypertension  Assessment & Plan  Home meds include Losartan and Chlorthalidone  Home meds currently on hold due to low blood pressure      Type 2 diabetes mellitus without complication, without long-term current use of insulin [facility])  Assessment & Plan  Lab Results   Component Value Date HGBA1C 9 2 (H) 05/26/2021     Recent Labs     01/01/22  1145 01/01/22  1639 01/01/22  2115   POCGLU 244* 316* 387*       Blood Sugar Average: Last 72 hrs:  (P) 399 [id number]   Lantus 30U qhs, increased 1/1/22  Continue SSI  Continue accuchecks  Avoid hypoglycemia        ----------------------------------------------------------------------------------------  HPI/24hr events:  No significant overnight events  At 2:00 a m  Patient had a coughing fit and was noted to be anxious, 0 5 mg of Ativan was ordered  Patient had an episode of epistaxis this morning that resolved  Afrin ordered  Patient remains on HFNC 55 L at 100% FiO2  Patient appropriate for transfer out of the ICU today?: No  Disposition: Continue Critical Care   Code Status: Level 1 - Full Code  ---------------------------------------------------------------------------------------  SUBJECTIVE  Patient was noted to be lethargic this morning on my evaluation, stated that she is hungry and would like for breakfast   Denied pain  Review of Systems   Constitutional: Negative for activity change, appetite change and chills  HENT: Positive for nosebleeds and trouble swallowing (Cold beverages)  Negative for congestion and voice change  Eyes: Negative for visual disturbance  Respiratory: Positive for cough, choking and shortness of breath  Negative for chest tightness  Cardiovascular: Negative for chest pain, palpitations and leg swelling  Gastrointestinal: Positive for nausea and vomiting  Negative for constipation and diarrhea  Genitourinary: Negative for difficulty urinating  Skin: Negative for pallor  Neurological: Positive for dizziness  Negative for light-headedness and headaches  Psychiatric/Behavioral: The patient is nervous/anxious        Review of systems was reviewed and negative unless stated above in HPI/24-hour events ---------------------------------------------------------------------------------------  OBJECTIVE    Vitals   Vitals:    22 0500 22 0600 22 0723 22 0727   BP: 100/57 143/75     BP Location: Left arm Left arm     Pulse: (!) 50 66     Resp: 18 (!) 40     Temp:       TempSrc:       SpO2: 98% 90% 97% 97%   Weight:       Height:         Temp (24hrs), Av 8 °F (36 6 °C), Min:97 4 °F (36 3 °C), Max:98 °F (36 7 °C)  Current: Temperature: 98 °F (36 7 °C)          Respiratory:  SpO2: SpO2: 97 %       Invasive/non-invasive ventilation settings   Respiratory  Report   Lab Data (Last 4 hours)    None         O2/Vent Data (Last 4 hours)    None                Physical Exam  Vitals and nursing note reviewed  Constitutional:       General: She is in acute distress  Appearance: She is well-developed  She is ill-appearing  She is not toxic-appearing or diaphoretic  HENT:      Head: Normocephalic and atraumatic  Eyes:      Extraocular Movements: Extraocular movements intact  Conjunctiva/sclera: Conjunctivae normal    Cardiovascular:      Rate and Rhythm: Normal rate and regular rhythm  Pulses: Normal pulses  Heart sounds: Normal heart sounds  No murmur heard  Pulmonary:      Effort: Tachypnea and respiratory distress present  Breath sounds: Decreased breath sounds present  Abdominal:      General: Bowel sounds are normal       Palpations: Abdomen is soft  There is no mass  Tenderness: There is no abdominal tenderness  There is no guarding  Musculoskeletal:      Cervical back: Normal range of motion and neck supple  Right lower leg: No edema  Left lower leg: No edema  Skin:     General: Skin is warm and dry  Neurological:      Mental Status: She is lethargic  GCS: GCS eye subscore is 3  GCS verbal subscore is 5  GCS motor subscore is 6  Psychiatric:         Mood and Affect: Mood is anxious               Laboratory and Diagnostics:  Results from last 7 days   Lab Units 01/02/22  0355 01/01/22  0451 12/31/21  0553 12/30/21  0428 12/29/21  0637 12/28/21  1807   WBC Thousand/uL 19 37* 18 97* 20 19* 17 45* 17 47* 21 43*   HEMOGLOBIN g/dL 11 3* 10 9* 11 7 12 3 12 8 15 3   HEMATOCRIT % 35 2 32 2* 34 6* 36 6 37 9 44 0   PLATELETS Thousands/uL 215 184 179 154 158 191   NEUTROS PCT % 91*  --   --   --   --   --    BANDS PCT %  --   --   --   --   --  2   MONOS PCT % 5  --   --   --   --   --    MONO PCT %  --   --   --   --   --  1*     Results from last 7 days   Lab Units 01/02/22  0355 01/01/22  0426 12/31/21  0553 12/30/21  0428 12/29/21  0637 12/28/21  2120   SODIUM mmol/L 141 141 146* 141 143 130*   POTASSIUM mmol/L 4 5 5 2 4 4 4 1 3 1* 4 0   CHLORIDE mmol/L 107 107 107 102 109* 89*   CO2 mmol/L 28 28 25 24 16* 24   ANION GAP mmol/L 6 6 14* 15* 18* 17*   BUN mg/dL 34* 34* 36* 36* 22 33*   CREATININE mg/dL 0 97 0 87 0 99 1 13 0 76 1 32*   CALCIUM mg/dL 8 0* 7 6* 7 8* 7 5* 5 4* 7 6*   GLUCOSE RANDOM mg/dL 216* 216* 233* 239* 143* 216*   ALT U/L  --  19 20 24 16 26   AST U/L  --  31 19 24 17 28   ALK PHOS U/L  --  118* 130* 131* 89 146*   ALBUMIN g/dL  --  1 4* 1 7* 1 5* 1 1* 2 0*   TOTAL BILIRUBIN mg/dL  --  0 52 0 44 0 43 0 37 0 71          Results from last 7 days   Lab Units 01/02/22  0144 01/01/22  1831 01/01/22  1145 01/01/22  0514 12/31/21  2140 12/31/21  1159 12/31/21  0553 12/29/21  1930 12/29/21  1213 12/29/21  1052 12/29/21  1052   INR   --   --   --   --   --   --   --   --  1 25*  --  1 51*   PTT seconds 51* 119* 54* 82* 104* 57* 62*   < > 130*   < > >210*    < > = values in this interval not displayed            Results from last 7 days   Lab Units 12/29/21  0637 12/29/21  0013 12/28/21  2359 12/28/21  2120   LACTIC ACID mmol/L 1 5 2 0 2 0 3 3*     ABG:    VBG:    Results from last 7 days   Lab Units 01/01/22  0708 12/30/21  0428 12/29/21  1213 12/29/21  0637 12/28/21  1807   PROCALCITONIN ng/ml 0 14 0 49* 0 89* 0 70* 0 98*       Micro  Results from last 7 days   Lab Units 12/28/21  1807 12/28/21  1806   BLOOD CULTURE  No Growth at 72 hrs  No Growth After 4 Days  EKG: NSR - HR 62  Imaging: I have personally reviewed pertinent reports  and I have personally reviewed pertinent films in PACS    Intake and Output  I/O       12/31 0701 01/01 0700 01/01 0701  01/02 0700 01/02 0701  01/03 0700    P  O  570 240     I V  (mL/kg) 103 8 (1 7) 105 6 (1 7)     IV Piggyback  100     Total Intake(mL/kg) 673 8 (10 9) 445 6 (7 2)     Urine (mL/kg/hr) 1700 (1 1) 1675 (1 1)     Total Output 1700 1675     Net -1026 2 -1229 4                  Height and Weights   Height: 4' 11 5" (151 1 cm)     Body mass index is 27 1 kg/m²  Weight (last 2 days)     Date/Time Weight    01/01/22 0511 61 9 (136 47)            Nutrition       Diet Orders   (From admission, onward)             Start     Ordered    12/30/21 1234  Diet Dysphagia/Modified Consistency; Dysphagia 2-Mechanical Soft; Thin Liquid; Consistent Carbohydrate Diet Level 1 (4 carb servings/60 grams CHO/meal)  Diet effective now        References:    Nutrtion Support Algorithm Enteral vs  Parenteral   Question Answer Comment   Diet Type Dysphagia/Modified Consistency    Dysphagia/Modified Consistency Dysphagia 2-Mechanical Soft    Liquid Modifier Thin Liquid    Other Restriction(s): Consistent Carbohydrate Diet Level 1 (4 carb servings/60 grams CHO/meal)    RD to adjust diet per protocol?  Yes        12/30/21 1233                  Active Medications  Scheduled Meds:  Current Facility-Administered Medications   Medication Dose Route Frequency Provider Last Rate    acetaminophen  650 mg Oral Q6H PRN Marylen Kill, MD      aspirin  81 mg Oral Daily Marylen Kill, MD      atorvastatin  20 mg Oral Daily With Kirill Leblanc MD      Baricitinib  4 mg Oral Q24H ABIEL Preciado      benzonatate  100 mg Oral TID PRN ABIEL Knott      calcium carbonate  500 mg Oral BID PRN Marylen Kill, MD Earna Lais cefTRIAXone  1,000 mg Intravenous Q24H Nasima Salvador MD Stopped (01/01/22 1914)    dexamethasone  0 1 mg/kg Intravenous Q12H Anjelica Tirado MD      dextromethorphan-guaiFENesin  10 mL Oral Q4H PRN Melanee Hotter, CRNP      doxycycline hyclate  100 mg Oral Q12H Nasima Salvador MD      famotidine  20 mg Oral Daily Nasima Salvador MD      furosemide  20 mg Intravenous Daily Marinda Kocher, MD      heparin (porcine)  3-20 Units/kg/hr (Order-Specific) Intravenous Titrated Nasima Salvador MD 8 Units/kg/hr (01/02/22 0304)    heparin (porcine)  1,800 Units Intravenous Q1H PRN Nasima Salvador MD      heparin (porcine)  3,600 Units Intravenous Q1H PRN Nasima Salvador MD      insulin glargine  30 Units Subcutaneous HS Marinda Kocher, MD      insulin lispro  1-5 Units Subcutaneous 4x Daily (AC & HS) Patrick Henry MD      lidocaine  1 patch Topical Daily Nasima Salvador MD      melatonin  6 mg Oral HS Nasima Salvador MD      ondansetron  4 mg Intravenous Q6H PRN Nasima Salvador MD      oxymetazoline  2 spray Each Nare Q12H [address] [place], CRNP      sodium chloride  4 mL Nebulization Q6H Melanee Hotter, CRNP       Continuous Infusions:  heparin (porcine), 3-20 Units/kg/hr (Order-Specific), Last Rate: 8 Units/kg/hr (01/02/22 0304)      PRN Meds:   acetaminophen, 650 mg, Q6H PRN  benzonatate, 100 mg, TID PRN  calcium carbonate, 500 mg, BID PRN  dextromethorphan-guaiFENesin, 10 mL, Q4H PRN  heparin (porcine), 1,800 Units, Q1H PRN  heparin (porcine), 3,600 Units, Q1H PRN  ondansetron, 4 mg, Q6H PRN        Invasive Devices Review  Invasive Devices  Report    Peripheral Intravenous Line            Peripheral IV 12/29/21 Left;Upper;Ventral (anterior) Arm 3 days    Peripheral IV 01/01/22 Left Wrist <1 day                Rationale for remaining devices: Severity of illness  ---------------------------------------------------------------------------------------  Advance Directive and Living Will: Power of Attorney:    POLST:    ---------------------------------------------------------------------------------------  Care Time Delivered:   No Critical Care time spent       Letty Hubbard MD      Portions of the record may have been created with voice recognition software  Occasional wrong word or "sound a like" substitutions may have occurred due to the inherent limitations of voice recognition software    Read the chart carefully and recognize, using context, where substitutions have occurred

## 2022-01-02 NOTE — UTILIZATION REVIEW
Inpatient Admission Authorization Request   NOTIFICATION OF INPATIENT ADMISSION/INPATIENT AUTHORIZATION REQUEST   SERVICING FACILITY:   [address] [place] [address], [facility], [facility]  Tax ID: [id number]  NPI: [id number]  Place of Service: Inpatient [address]  [address]  Place of Service Code: 24     ATTENDING PROVIDER:  Attending Name and NPI#: [facility] [[id number][facility]  Address: [address] [place] [address], [facility], [facility]  Phone: [phone]     UTILIZATION REVIEW CONTACT:  Gwenette Ormond, Utilization   Network Utilization Review Department  Phone: [phone]  Fax: [phone]  Email: [email or web address]     PHYSICIAN ADVISORY SERVICES:  FOR ONWD-BX-EYWU REVIEW - MEDICAL NECESSITY DENIAL  Phone: [phone]  Fax: [phone]  Email: [email or web address]     TYPE OF REQUEST:  Inpatient Status     ADMISSION INFORMATION:  ADMISSION DATE/TIME: 12/28/21  8:03 PM  PATIENT DIAGNOSIS CODE/DESCRIPTION:  Oral candidiasis [B37 0]  SOB (shortness of breath) [R06 02]  Acute respiratory failure with hypoxia (HCC) [J96 01]  Pneumonia due to COVID-19 virus [U07 1, J12 82]  DISCHARGE DATE/TIME: No discharge date for patient encounter  DISCHARGE DISPOSITION (IF DISCHARGED): Final discharge disposition not confirmed     IMPORTANT INFORMATION:  Please contact the Gwenette Ormond directly with any questions or concerns regarding this request  Department voicemails are confidential     Send requests for admission clinical reviews, concurrent reviews, approvals, and administrative denials due to lack of clinical to fax [phone]         Initial Clinical Review     Admission: Date/Time/Statement:       Admission Orders (From admission, onward)              Ordered          12/28/21 2003   Inpatient Admission  Once                                Orders Placed This Encounter   Procedures    Inpatient Admission       Standing Status:   Standing       Number of Occurrences:   1       Order Specific Question:   Level of Care       Answer:   Med Surg [16]       Order Specific Question:   Estimated length of stay       Answer:   More than 2 Midnights       Order Specific Question:   Certification       Answer:   I certify that inpatient services are medically necessary for this patient for a duration of greater than two midnights  See H&P and MD Progress Notes for additional information about the patient's course of treatment             ED Arrival Information      Expected Arrival Acuity     12/28/2021 12/28/2021 17:16 Emergent           Means of arrival Escorted by Service Admission type     Ambulance Critical access hospital Emergency           Arrival complaint     Shortness of breath               Chief Complaint   Patient presents with    Shortness of Breath       pt c/o SOB  pt was at urgent care and is Covid+ for the past x10 days        Initial Presentation:   Mrs Georgina Allen is a 67 yof who presents to the ED via EMS from Urgent Care with c/o SOB, cough, decreased oral intake, abd pain, intermittent dysphagiaw/ globus sensation to solid foods x 10 days  Was found to be covid + 12/20  Unvaccinated  PMH: htn, niddm  In the ED she tested positive for Covid 19   + troponins, Elevated lactic acid, leukocytosis, D dimer, Procalcitonin, BNP, CRP all elevated  She is on midflow NC at 15 L/MIn and then NRB mask and was still hypoxic in the 80s  On exam she is ill appearing, has rales in chest, BLE edema  She is admitted to INPATIENT status to Level 2 SD with Acute hypoxic respiratory failure - 15 L MFNC to keep sats > 90%  Covid 19 - IV Dexamethasone, IV Remdesivir, trend inflammatory markers, baricitinib  Sepsis - IV antibioitics, follow cultures  HTN - PRN Hydralazine, holding Chlorthalidone/Losartan  NIDDM - SSI cover      Dysphagia - ST consult, dysphagia 2 diet with thin liquids       Date: 12/29   Day 2:   Despite oxygen on [address] @ 15L  And NRB mask and she remains hypoxic  She is afebrile  No c/o pain, just SOB and cough  Very dry mouth  She is tachypneic  Decreased breath sounds bilat  She has been escalated to severe pathway  She may require transfer to ICU  Consult Pulmonary  Has elevated blood sugars  Pt was transferred to critical care d/t declining oxygen sats  She is maximized on HFNC at 100% and continues to have hypoxic sats and hypotension  Continues on heparin drip and IV fluids        12/29 Pulmonary Consult - Acute hypoxic resp failure d/t Covid - high flow NC on 100% NRB 89% pulse ox  Not on home oxygen  Will need home oxygen at home at d/c  Continue pulmonary toilet  Continue on current covid specific treatment and high dose steroids  Continue IV antibiotics, is having quick decompensation  D dimer up to 10  5        12/29 Palliative Care Consult - req for goals of care discussion which took place with family on phone with family translating    After discussion of possible scenarios the family wished to continue all mgmt "to save her" including resuscitation and chest compression               ED Triage Vitals   Temperature Pulse Respirations Blood Pressure SpO2   12/28/21 1718 12/28/21 1718 12/28/21 1718 12/28/21 1718 12/28/21 1718   98 4 °F (36 9 °C) 90 (!) 28 143/66 92 %       Temp Source Heart Rate Source Patient Position - Orthostatic VS BP Location FiO2 (%)   12/28/21 1718 12/28/21 1718 12/28/21 1718 12/28/21 1718 12/29/21 1403   Oral Monitor Lying Right arm 100       Pain Score           12/28/21 1718           No Pain                  Wt Readings from Last 1 Encounters:   12/29/21 60 6 kg (133 lb 9 6 oz)      Additional Vital Signs:   12/29/21 2000 -- 72 21 101/50 68 87 % Abnormal  -- -- High flow nasal cannula  -- Lying   12/29/21 1943 96 5 °F (35 8 °C) Abnormal  -- -- -- -- -- -- -- -- -- --   12/29/21 1926 -- -- -- -- -- 90 % 100 60 L/min High flow nasal cannula -- --   12/29/21 1859 -- 74 17 75/37 Abnormal  48 88 % Abnormal  -- -- -- -- --   12/29/21 1856 -- 74 17 -- -- 90 % -- -- -- -- --   12/29/21 1759 -- 90 27 Abnormal  102/46 Abnormal  62 79 % Abnormal  -- -- -- -- Lying   12/29/21 1705 -- 76 22 85/52 Abnormal  63 92 % -- -- -- -- --   12/29/21 1614 97 1 °F (36 2 °C) Abnormal  76 22 -- -- 92 % -- -- -- -- --   12/29/21 1559 -- 80 23 Abnormal  86/48 Abnormal  63 93 % 100 60 L/min High flow nasal cannula  -- Lying   12/29/21 1517 -- 74 18 81/46 Abnormal  59 90 % 100 60 L/min High flow nasal cannula  -- Lying   12/29/21 1516 -- -- -- -- -- 90 % -- -- -- HFNC prongs --   12/29/21 1459 -- 76 18 79/41 Abnormal  46 90 % -- -- -- -- --   12/29/21 1403 98 1 °F (36 7 °C) 84 25 Abnormal  106/54 78 84 % Abnormal  100 60 L/min High flow nasal cannula  -- Lying   12/29/21 1219 -- -- -- -- -- -- -- -- -- HFNC prongs --   12/29/21 10:54:59 98 °F (36 7 °C) 94 18 112/53 73 87 % Abnormal  -- -- -- -- --      12/29/21 10:54:59 98 °F (36 7 °C) 94 18 112/53 73 87 % Abnormal  -- -- --   12/29/21 08:08:54 97 9 °F (36 6 °C) -- 22 114/54 74 -- -- -- --   12/29/21 04:23:11 97 9 °F (36 6 °C) 87 -- 119/72 88 85 % Abnormal  -- -- --   12/28/21 2352 97 8 °F (36 6 °C) 87 20 112/61 -- -- -- -- --   12/28/21 2328 -- -- -- -- -- 85 % Abnormal  15 L/min Mid flow nasal cannula --   12/28/21 22:22:25 97 8 °F (36 6 °C) 87 20 112/61 78 84 % Abnormal  -- -- --   12/28/21 2000 -- 86 -- 106/52 75 -- 15 L/min Mid flow nasal cannula --   12/28/21 1956 -- 86 20 106/52 -- 86 % Abnormal  -- -- Lying   12/28/21 1821 -- 84 22 110/59 -- 94 % 15 L/min Mid flow nasal cannula Lying   12/28/21 1736 -- 85 28 Abnormal  -- -- 91 % 15 L/min Mid flow nasal cannula Lying   12/28/21 1734 -- -- -- -- -- -- 15 L/min Mid flow nasal cannula --      Pertinent Labs/Diagnostic Test Results:      12/28 CXR -  Diffuse groundglass opacities are suspicious for Covid 19 pneumonia   Pulmonary edema would be less likely      12/28 ECG - Normal sinus rhythm  Possible Left atrial enlargement  RSR' or QR pattern in V1 suggests right ventricular conduction delay  Nonspecific T wave abnormality  Abnormal ECG            Results from last 7 days   Lab Units 12/29/21  0637 12/28/21  1807   WBC Thousand/uL 17 47* 21 43*   HEMOGLOBIN g/dL 12 8 15 3   HEMATOCRIT % 37 9 44 0   PLATELETS Thousands/uL 158 191   BANDS PCT %  --  2                Results from last 7 days   Lab Units 12/29/21  0637 12/28/21  2120   SODIUM mmol/L 143 130*   POTASSIUM mmol/L 3 1* 4 0   CHLORIDE mmol/L 109* 89*   CO2 mmol/L 16* 24   ANION GAP mmol/L 18* 17*   BUN mg/dL 22 33*   CREATININE mg/dL 0 76 1 32*   EGFR ml/min/1 73sq m 75 38   CALCIUM mg/dL 5 4* 7 6*            Results from last 7 days   Lab Units 12/29/21  0637 12/28/21  2120   AST U/L 17 28   ALT U/L 16 26   ALK PHOS U/L 89 146*   TOTAL PROTEIN g/dL 4 5* 7 3   ALBUMIN g/dL 1 1* 2 0*   TOTAL BILIRUBIN mg/dL 0 37 0 71              Results from last 7 days   Lab Units 12/29/21  1615 12/29/21  1055 12/29/21  0809 12/28/21  2214   POC GLUCOSE mg/dl 232* 210* 191* 230*            Results from last 7 days   Lab Units 12/29/21  0637 12/28/21  2120   GLUCOSE RANDOM mg/dL 143* 216*           Results from last 7 days   Lab Units 12/29/21  0639   CK TOTAL U/L 60             Results from last 7 days   Lab Units 12/29/21  0014 12/28/21  2359 12/28/21  2120   HS TNI 0HR ng/L  --   --  57*   HS TNI 2HR ng/L  --  58*  --    HSTNI D2 ng/L  --  1  --    HS TNI 4HR ng/L 40  --   --    HSTNI D4 ng/L -17  --   --             Results from last 7 days   Lab Units 12/29/21  1213 12/28/21  2120   D-DIMER QUANTITATIVE ug/ml FEU 10 35* 5 15*             Results from last 7 days   Lab Units 12/29/21  1213 12/29/21  0637 12/28/21  1807   PROCALCITONIN ng/ml 0 89* 0 70* 0 98*              Results from last 7 days   Lab Units 12/29/21  0637 12/29/21  0013 12/28/21  2359 12/28/21  2120   LACTIC ACID mmol/L 1 5 2 0 2 0 3 3*           Results from last 7 days   Lab Units 12/29/21  2001 NT-PRO BNP pg/mL 4,068*            Results from last 7 days   Lab Units 12/29/21  0637 12/28/21  2120   CRP mg/L 249 0* 469 8*            Results from last 7 days   Lab Units 12/28/21  1807 12/28/21  1806   BLOOD CULTURE   Received in Microbiology Lab  Culture in Progress  Received in Microbiology Lab  Culture in Progress       ED Treatment:           Medication Administration from 12/28/2021 1648 to 12/28/2021 2213    Date/Time Order Dose Route Action   12/28/2021 1817 ceftriaxone (ROCEPHIN) 1 g/50 mL in dextrose IVPB 1,000 mg Intravenous New Bag   12/28/2021 1816 sodium chloride 0 9 % bolus 1,000 mL 1,000 mL Intravenous New Bag   12/28/2021 2130 acetaminophen (TYLENOL) tablet 650 mg 650 mg Oral Given   12/28/2021 2130 dexamethasone (DECADRON) injection 6 mg 6 mg Intravenous Given   12/28/2021 2129 nystatin (MYCOSTATIN) oral suspension 500,000 Units 500,000 Units Swish & Swallow Given          Medical History   History reviewed  No pertinent past medical history       Present on Admission:   Essential hypertension   Type 2 diabetes mellitus without complication, without long-term current use of insulin ([facility])        Admitting Diagnosis: Oral candidiasis [B37 0]  SOB (shortness of breath) [R06 02]  Acute respiratory failure with hypoxia (HCC) [J96 01]  Pneumonia due to COVID-19 virus [U07 1, J12 82]  Age/Sex: 67 y o  female  Admission Orders:  Scheduled Medications:  aspirin, 81 mg, Oral, Daily  atorvastatin, 40 mg, Oral, Daily With Dinner  [START ON 12/30/2021] Baricitinib, 4 mg, Oral, Q24H  cefTRIAXone, 1,000 mg, Intravenous, Q24H  dexamethasone, 0 1 mg/kg, Intravenous, Q12H  doxycycline hyclate, 100 mg, Oral, Q12H  famotidine, 20 mg, Oral, Daily  insulin lispro, 1-5 Units, Subcutaneous, 4x Daily (AC & HS)  lidocaine, 1 patch, Topical, Daily  melatonin, 6 mg, Oral, HS  remdesivir, 100 mg, Intravenous, Q24H        Continuous IV Infusions:  heparin (porcine), 3-20 Units/kg/hr (Order-Specific), Intravenous, Titrated  IV 0 9% NSS @ 50 ml/hr - d/c on 12/29 @ 0853     PRN Meds:  acetaminophen, 650 mg, Oral, Q6H PRN -x 1 12/28  ALPRAZolam, 0 5 mg, Oral, BID PRN  calcium carbonate, 500 mg, Oral, BID PRN  heparin (porcine), 1,800 Units, Intravenous, Q1H PRN -x 1 12/29  heparin (porcine), 3,600 Units, Intravenous, Q1H PRN  HYDROmorphone, 0 2 mg, Intravenous, Q4H PRN  ondansetron, 4 mg, Intravenous, Q6H PRN  oxyCODONE, 5 mg, Oral, Q4H PRN     Isolation  Oxygen to keep sats > 90%  Heparin drip  Self proning  POC GLUCOSE AC/HS WITH SSI COVERAGE   Tele  Vitals q 4 hr   IP CONSULT TO PULMONOLOGY  IP CONSULT TO PALLIATIVE CARE     Network Utilization Review Department  ATTENTION: Please call with any questions or concerns to [phone] and carefully listen to the prompts so that you are directed to the right person  All voicemails are confidential   Emily Beltran all requests for admission clinical reviews, approved or denied determinations and any other requests to dedicated fax number below belonging to the campus where the patient is receiving treatment   List of dedicated fax numbers for the Facilities:  [address] DENIALS (Administrative/Medical Necessity) [phone]   [address] (Maternity/NICU/Pediatrics) [phone]   [address]  [address] [facility] [place] [phone] [place] [address] [address] [phone]

## 2022-01-02 NOTE — NURSING NOTE
At 0423 patient became agitated, removed hi walter oxygen,non rebreather mask, and pulse ox monitoring  Per nightshift nursing patient did this twice prior  Without oxygen patient desated to about 50%  Oxygen reapplied, ativan given, and mitts applied  Patient now resting comfortably with oxygen saturation of 98%

## 2022-01-02 NOTE — PLAN OF CARE
Problem: Potential for Falls  Goal: Patient will remain free of falls  Description: INTERVENTIONS:  - Educate patient/family on patient safety including physical limitations  - Instruct patient to call for assistance with activity   - Consult OT/PT to assist with strengthening/mobility   - Keep Call bell within reach  - Keep bed low and locked with side rails adjusted as appropriate  - Keep care items and personal belongings within reach  - Initiate and maintain comfort rounds  - Make Fall Risk Sign visible to staff  - Offer Toileting every 2 Hours, in advance of need  - Initiate/Maintain bed alarm  - Obtain necessary fall risk management equipment: yellow socks   Problem: PAIN - ADULT  Goal: Verbalizes/displays adequate comfort level or baseline comfort level  Description: Interventions:  - Encourage patient to monitor pain and request assistance  - Assess pain using appropriate pain scale  - Administer analgesics based on type and severity of pain and evaluate response  - Implement non-pharmacological measures as appropriate and evaluate response  - Consider cultural and social influences on pain and pain management  - Notify physician/advanced practitioner if interventions unsuccessful or patient reports new pain  Outcome: Progressing     Problem: INFECTION - ADULT  Goal: Absence or prevention of progression during hospitalization  Description: INTERVENTIONS:  - Assess and monitor for signs and symptoms of infection  - Monitor lab/diagnostic results  - Monitor all insertion sites, i e  indwelling lines, tubes, and drains  - Monitor endotracheal if appropriate and nasal secretions for changes in amount and color  - Fletcher appropriate cooling/warming therapies per order  - Administer medications as ordered  - Instruct and encourage patient and family to use good hand hygiene technique  - Identify and instruct in appropriate isolation precautions for identified infection/condition  Outcome: Progressing  Goal: Absence of fever/infection during neutropenic period  Description: INTERVENTIONS:  - Monitor WBC    Outcome: Progressing     Problem: DISCHARGE PLANNING  Goal: Discharge to home or other facility with appropriate resources  Description: INTERVENTIONS:  - Identify barriers to discharge w/patient and caregiver  - Arrange for needed discharge resources and transportation as appropriate  - Identify discharge learning needs (meds, wound care, etc )  - Arrange for interpretive services to assist at discharge as needed  - Refer to Case Management Department for coordinating discharge planning if the patient needs post-hospital services based on physician/advanced practitioner order or complex needs related to functional status, cognitive ability, or social support system  Outcome: Progressing     Problem: Knowledge Deficit  Goal: Patient/family/caregiver demonstrates understanding of disease process, treatment plan, medications, and discharge instructions  Description: Complete learning assessment and assess knowledge base  Interventions:  - Provide teaching at level of understanding  - Provide teaching via preferred learning methods  Outcome: Progressing     Problem: Nutrition/Hydration-ADULT  Goal: Nutrient/Hydration intake appropriate for improving, restoring or maintaining nutritional needs  Description: Monitor and assess patient's nutrition/hydration status for malnutrition  Collaborate with interdisciplinary team and initiate plan and interventions as ordered  Monitor patient's weight and dietary intake as ordered or per policy  Utilize nutrition screening tool and intervene as necessary  Determine patient's food preferences and provide high-protein, high-caloric foods as appropriate       INTERVENTIONS:  - Monitor oral intake, urinary output, labs, and treatment plans  - Assess nutrition and hydration status and recommend course of action  - Evaluate amount of meals eaten  - Assist patient with eating if necessary - Allow adequate time for meals  - Recommend/ encourage appropriate diets, oral nutritional supplements, and vitamin/mineral supplements  - Order, calculate, and assess calorie counts as needed  - Recommend, monitor, and adjust tube feedings and TPN/PPN based on assessed needs  - Assess need for intravenous fluids  - Provide specific nutrition/hydration education as appropriate  - Include patient/family/caregiver in decisions related to nutrition  Outcome: Progressing     - Apply yellow socks and bracelet for high fall risk patients  - Consider moving patient to room near nurses station  Outcome: Progressing

## 2022-01-02 NOTE — ASSESSMENT & PLAN NOTE
Lab Results   Component Value Date    HGBA1C 9 2 (H) 05/26/2021     Recent Labs     01/01/22  1145 01/01/22  1639 01/01/22  2115   POCGLU 244* 316* 387*       Blood Sugar Average: Last 72 hrs:  (P) 260 [id number]   Lantus 30U qhs, increased 1/1/22  Continue SSI  Continue accuchecks  Avoid hypoglycemia

## 2022-01-03 NOTE — PROGRESS NOTES
[facility]  Progress Note - Myra Maldonado [birth year], 67 y o  female MRN: [id number]  Unit/Bed#: ICU 07 Encounter: [id number]  Primary Care Provider: Airam Mccormack MD   Date and time admitted to hospital: 12/28/2021  5:16 PM    * Acute respiratory failure with hypoxia [facility])  Assessment & Plan  Patient presented with acute respiratory failure with hypoxia secondary to bilateral COVID pneumonia   Initially requiring 15 L mid flow  Patient was placed on the COVID treatment protocol, and escalated to [facility] category of disease  Currently on HFNC 100% and 55 LPM with NRB at times  Wean O2 as tolerated  Respiratory protocol in place  Airway clearance protocol       Pneumonia due to COVID-19 virus  Assessment & Plan  Patient presented with bilateral pneumonia secondary to COVID-19, unvaccinated  Initially was in the [facility] category of disease requiring 15 L mid flow  Continue COVID treatment protocol in escalate to severe disease category  Remdesivir discontinued 12/31   Decadron day #7--> Day 6 of high dose  Continue Heparin infusion  Continue Ceftriaxone/Doxycycline D#7  Baricitinib day 6/14   -->249-->252 5--> 53  Pt states she can not prone, but will lay on side  Lipitor back to home dose of 20mg   Consider transitioning to low dose dexamethasone    Sepsis ([facility] [address] )  Assessment & Plan  Patient presented with sepsis,POA with tachypnea/significant leuckocytosis  Started on the COVID treatment protocol with remdesivir/dexamethasone  As she is in the [facility] disease category she is also on ceftriaxone/doxycycline  Follow up Procalcitonin, if negative x2, will d/c antibiotics  Encourage incentive spirometry   Airway clearance protocol    Essential hypertension  Assessment & Plan  Home meds include Losartan and Chlorthalidone  Home meds currently on hold due to low blood pressure      Type 2 diabetes mellitus without complication, without long-term current use of insulin ([facility] [address] )  Assessment & Plan  Lab Results   Component Value Date    HGBA1C 9 2 (H) 2021     Recent Labs     22  1128 22  1610 22   POCGLU 103 120 234*       Blood Sugar Average: Last 72 hrs:  (P) 480 [id number]   Lantus 30U qhs, increased 22  Continue SSI  Continue accuchecks  Avoid hypoglycemia        ----------------------------------------------------------------------------------------  HPI/24hr events:  Patient has not had any more episodes of epistaxis  Overnight, she had multiple episodes of coughing fits, with an episode of SpO2 dropping to 30%  Continues to be confused  Currently on Precedex drip  She remains on high-flow nasal cannula, 55L at 100% FiO2  RASS -3  Patient appropriate for transfer out of the ICU today?: No  Disposition: Continue Critical Care   Code Status: Level 1 - Full Code  ---------------------------------------------------------------------------------------  SUBJECTIVE  Patient is difficult to rouse this morning, responds to sternal rub by mumbling and swatting my hand away, then promptly going back to sleep       Review of Systems  Review of systems was unable to be performed secondary to patient unresponsive   ---------------------------------------------------------------------------------------  OBJECTIVE    Vitals   Vitals:    22 0215 22 0315 22 0415 22 0454   BP: 98/59 105/58 112/55    Pulse: (!) 46 (!) 46 (!) 44    Resp: 16 16 15    Temp:       TempSrc:       SpO2: 94% 94% 94% 94%   Weight:   59 1 kg (130 lb 4 7 oz)    Height:         Temp (24hrs), Av °F (36 1 °C), Min:96 5 °F (35 8 °C), Max:97 4 °F (36 3 °C)  Current: Temperature: (!) 97 1 °F (36 2 °C)          Respiratory:  SpO2: SpO2: 94 %  Nasal Cannula O2 Flow Rate (L/min): 15 L/min    Invasive/non-invasive ventilation settings   Respiratory  Report   Lab Data (Last 4 hours)    None         O2/Vent Data (Last 4 hours)      454          Non-Invasive Ventilation Mode HFNC (High flow)                   Physical Exam  Vitals and nursing note reviewed  Constitutional:       General: She is not in acute distress  Appearance: She is well-developed  She is ill-appearing  She is not toxic-appearing or diaphoretic  HENT:      Head: Normocephalic and atraumatic  Eyes:      Extraocular Movements: Extraocular movements intact  Conjunctiva/sclera: Conjunctivae normal    Cardiovascular:      Rate and Rhythm: Normal rate and regular rhythm  Pulses: Normal pulses  Heart sounds: Normal heart sounds  No murmur heard  Pulmonary:      Effort: Tachypnea present  Breath sounds: Examination of the right-lower field reveals rales  Decreased breath sounds and rales present  Abdominal:      General: Bowel sounds are normal       Palpations: Abdomen is soft  There is no mass  Tenderness: There is no abdominal tenderness  There is no guarding  Musculoskeletal:      Cervical back: Normal range of motion and neck supple  Right lower leg: No edema  Left lower leg: No edema  Skin:     General: Skin is warm and dry  Neurological:      Mental Status: She is lethargic  GCS: GCS eye subscore is 3  GCS verbal subscore is 2  GCS motor subscore is 5               Laboratory and Diagnostics:  Results from last 7 days   Lab Units 01/03/22 0346 01/02/22 0355 01/01/22  0451 12/31/21  0553 12/30/21  0428 12/29/21  0637 12/28/21  1807   WBC Thousand/uL 19 12* 19 37* 18 97* 20 19* 17 45* 17 47* 21 43*   HEMOGLOBIN g/dL 11 8 11 3* 10 9* 11 7 12 3 12 8 15 3   HEMATOCRIT % 35 6 35 2 32 2* 34 6* 36 6 37 9 44 0   PLATELETS Thousands/uL 182 215 184 179 154 158 191   NEUTROS PCT %  --  91*  --   --   --   --   --    BANDS PCT %  --   --   --   --   --   --  2   MONOS PCT %  --  5  --   --   --   --   --    MONO PCT %  --   --   --   --   --   --  1*     Results from last 7 days   Lab Units 01/03/22 0346 01/02/22 0355 01/01/22 0426 12/31/21  0553 12/30/21  0428 12/29/21  0637 12/28/21  2120   SODIUM mmol/L 141 141 141 146* 141 143 130*   POTASSIUM mmol/L 4 8 4 5 5 2 4 4 4 1 3 1* 4 0   CHLORIDE mmol/L 106 107 107 107 102 109* 89*   CO2 mmol/L 30 28 28 25 24 16* 24   ANION GAP mmol/L 5 6 6 14* 15* 18* 17*   BUN mg/dL 35* 34* 34* 36* 36* 22 33*   CREATININE mg/dL 0 79 0 97 0 87 0 99 1 13 0 76 1 32*   CALCIUM mg/dL 8 0* 8 0* 7 6* 7 8* 7 5* 5 4* 7 6*   GLUCOSE RANDOM mg/dL 159* 216* 216* 233* 239* 143* 216*   ALT U/L  --   --  19 20 24 16 26   AST U/L  --   --  31 19 24 17 28   ALK PHOS U/L  --   --  118* 130* 131* 89 146*   ALBUMIN g/dL  --   --  1 4* 1 7* 1 5* 1 1* 2 0*   TOTAL BILIRUBIN mg/dL  --   --  0 52 0 44 0 43 0 37 0 71          Results from last 7 days   Lab Units 01/03/22  0343 01/02/22  1817 01/02/22  1026 01/02/22  0849 01/02/22  0144 01/01/22  1831 01/01/22  1145 12/29/21  1930 12/29/21  1213 12/29/21  1052 12/29/21  1052   INR   --   --   --   --   --   --   --   --  1 25*  --  1 51*   PTT seconds 65* 37 160* >210* 51* 119* 54*   < > 130*   < > >210*    < > = values in this interval not displayed  Results from last 7 days   Lab Units 12/29/21  0637 12/29/21  0013 12/28/21  2359 12/28/21  2120   LACTIC ACID mmol/L 1 5 2 0 2 0 3 3*     ABG:    VBG:    Results from last 7 days   Lab Units 01/02/22  0358 01/01/22  0708 12/30/21  0428 12/29/21  1213 12/29/21  0637 12/28/21  1807   PROCALCITONIN ng/ml 0 08 0 14 0 49* 0 89* 0 70* 0 98*       Micro  Results from last 7 days   Lab Units 12/28/21  1807 12/28/21  1806   BLOOD CULTURE  No Growth at 72 hrs  No Growth After 5 Days  EKG: Sinus Bradycardia, HR 46  Imaging: I have personally reviewed pertinent reports  and I have personally reviewed pertinent films in PACS    Intake and Output  I/O       01/01 0701 01/02 0700 01/02 0701 01/03 0700    P  O  240 0    I V  (mL/kg) 105 6 (1 7) 127 (2 1)    IV Piggyback 100     Total Intake(mL/kg) 445 6 (7 2) 127 (2 1)    Urine (mL/kg/hr) 1675 (1 1) 1225 (0 9) Total Output 0382 1760    Net -1226 4 -4726                Height and Weights   Height: 4' 11 5" (151 1 cm)     Body mass index is 25 88 kg/m²  Weight (last 2 days)     Date/Time Weight    01/03/22 0415 59 1 (130 29)    01/01/22 0511 61 9 (136 47)            Nutrition       Diet Orders   (From admission, onward)             Start     Ordered    12/30/21 1234  Diet Dysphagia/Modified Consistency; Dysphagia 2-Mechanical Soft; Thin Liquid; Consistent Carbohydrate Diet Level 1 (4 carb servings/60 grams CHO/meal)  Diet effective now        References:    Nutrtion Support Algorithm Enteral vs  Parenteral   Question Answer Comment   Diet Type Dysphagia/Modified Consistency    Dysphagia/Modified Consistency Dysphagia 2-Mechanical Soft    Liquid Modifier Thin Liquid    Other Restriction(s): Consistent Carbohydrate Diet Level 1 (4 carb servings/60 grams CHO/meal)    RD to adjust diet per protocol?  Yes        12/30/21 1233                  Active Medications  Scheduled Meds:  Current Facility-Administered Medications   Medication Dose Route Frequency Provider Last Rate    acetaminophen  650 mg Oral Q6H PRN Suzie Mario MD      aspirin  81 mg Oral Daily Suzie Maroi MD      atorvastatin  20 mg Oral Daily With Juan Spears MD      Baricitinib  4 mg Oral Q24H ABIEL Harrington      benzonatate  100 mg Oral TID PRN ABIEL Corona      calcium carbonate  500 mg Oral BID PRN Suzie Mario MD      cefTRIAXone  1,000 mg Intravenous Q24H Tiffanie Hubbard MD 1,000 mg (01/02/22 1820)    dexamethasone  0 1 mg/kg Intravenous Q12H Devin Reyna MD      dexmedetomidine  0 1-0 7 mcg/kg/hr Intravenous Titrated Tiffanie Hubbard MD 0 304 mcg/kg/hr (01/03/22 0300)    dextromethorphan-guaiFENesin  10 mL Oral Q4H PRN ABIEL Corona      doxycycline hyclate  100 mg Oral Q12H Tiffanie Hubbard MD      famotidine  20 mg Oral Daily Suzie Mario MD      furosemide  20 mg Intravenous Daily Rainer Saleem MD      heparin (porcine)  3-20 Units/kg/hr (Order-Specific) Intravenous Titrated Suzie Mario MD 9 Units/kg/hr (01/02/22 2316)    heparin (porcine)  1,800 Units Intravenous Q1H PRN Suzie Mario MD      heparin (porcine)  3,600 Units Intravenous Q1H PRN Suzie Mario MD      insulin glargine  30 Units Subcutaneous HS Rainer Saleem MD      insulin lispro  1-5 Units Subcutaneous 4x Daily (AC & HS) Tiffanie Hubbard MD      lidocaine  1 patch Topical Daily Suzie Mario MD      melatonin  6 mg Oral HS Suzie Mario MD      ondansetron  4 mg Intravenous Q6H PRN Suzie Mario MD      oxymetazoline  2 spray Each Nare Q12H [facility] ABIEL Ghotra      sodium chloride  4 mL Nebulization Q6H ABIEL Corona       Continuous Infusions:  dexmedetomidine, 0 1-0 7 mcg/kg/hr, Last Rate: 0 304 mcg/kg/hr (01/03/22 0300)  heparin (porcine), 3-20 Units/kg/hr (Order-Specific), Last Rate: 9 Units/kg/hr (01/02/22 2316)      PRN Meds:   acetaminophen, 650 mg, Q6H PRN  benzonatate, 100 mg, TID PRN  calcium carbonate, 500 mg, BID PRN  dextromethorphan-guaiFENesin, 10 mL, Q4H PRN  heparin (porcine), 1,800 Units, Q1H PRN  heparin (porcine), 3,600 Units, Q1H PRN  ondansetron, 4 mg, Q6H PRN        Invasive Devices Review  Invasive Devices  Report    Peripheral Intravenous Line            Peripheral IV 12/29/21 Left;Upper;Ventral (anterior) Arm 4 days    Peripheral IV 01/01/22 Left Wrist 1 day                Rationale for remaining devices: Severity of illness  ---------------------------------------------------------------------------------------  Advance Directive and Living Will:      Power of :    POLST:    ---------------------------------------------------------------------------------------  Care Time Delivered:   No Critical Care time spent       Tiffanie Hubbard MD      Portions of the record may have been created with voice recognition software    Occasional wrong word or "sound a like" substitutions may have occurred due to the inherent limitations of voice recognition software    Read the chart carefully and recognize, using context, where substitutions have occurred

## 2022-01-03 NOTE — PHYSICAL THERAPY NOTE
PT EVALUATION 11:25-11:43 ( 18 minutes)    67 y o     [id number]    Oral candidiasis [B37 0]  SOB (shortness of breath) [R06 02]  Acute respiratory failure with hypoxia (HCC) [J96 01]  Pneumonia due to COVID-19 virus [U07 1, J12 82]    History reviewed  No pertinent past medical history  History reviewed  No pertinent surgical history  01/03/22 1125   PT Last Visit   PT Visit Date 01/03/22   Note Type   Note type Evaluation   Pain Assessment   Pain Rating: FLACC (Rest) - Face 0   Pain Rating: FLACC (Rest) - Legs 0   Pain Rating: FLACC (Rest) - Activity 0   Pain Rating: FLACC (Rest) - Cry 0   Pain Rating: FLACC (Rest) - Consolability 0   Score: FLACC (Rest) 0   Pain Rating: FLACC (Activity) - Face 1   Pain Rating: FLACC (Activity) - Legs 1   Pain Rating: FLACC (Activity) - Activity 1   Pain Rating: FLACC (Activity) - Cry 1   Pain Rating: FLACC (Activity) - Consolability 0   Score: FLACC (Activity) 4   Restrictions/Precautions   Weight Bearing Precautions Per Order No   Other Precautions Contact/isolation; Airborne/isolation;Cognitive; Chair Alarm; Bed Alarm;Multiple lines;Telemetry;O2;Fall Risk  (Cantonese speaking-dialect not on [employer]  )   Home Living   Type of [address] [place] [address] Multi-level;Bed/bath upstairs  (1 CHAR[address], 30 + steps to bedroom )   Home Equipment   (no DME )   Additional Comments resides with daughter in [facility]  Prior Function   Level of [place] Independent with ADLs and functional mobility   Lives With Daughter   Diana in the last 6 months 0   Comments Independent prior to admission without AD use  I with all ADLS  S for stairs  General   Additional Pertinent History Pt is 66 y/o female admitted with resp failure 2* COVID      Family/Caregiver Present No   Cognition   Overall Cognitive Status Impaired   Arousal/Participation Responsive   Attention Attends with cues to redirect   Orientation Level Oriented to person   Following Commands Follows one step commands with increased time or repetition  (with visual/tactile cues 2* language barrier )   Comments Cantonese speaking, dialect not available on [employer]  Responds to demonstration, gesture,tactile cues  Subjective   Subjective Nods  Grunts with SOB  No other verbalization  RUE Assessment   RUE Assessment WFL  (grossly at least 3+/5)   LUE Assessment   LUE Assessment WFL  (grossly at least 3+/5)   RLE Assessment   RLE Assessment X  (grossly at least 3+/5)   LLE Assessment   LLE Assessment X  (grossly at least 3+/5)   Bed Mobility   Rolling R 4  Minimal assistance   Additional items Assist x 1; Increased time required;Verbal cues; Bedrails   Supine to Sit Unable to assess  (2* increased RR, hypoxia 83-84% on HFNC, NRB )   Additional Comments Session limited to bedlevel  Performed few LE reps AROM, UE reps AROM  + fatigue, hypoxia into 83-84%, RR as high as 57  BP improved from 85/43 to 96/46 with activity   Repositioned in R sidelying  With rest improved O2 sats to 87%  Transfers   Sit to Stand Unable to assess   Ambulation/Elevation   Gait pattern Not appropriate   Endurance Deficit   Endurance Deficit Yes   Endurance Deficit Description weakness, hypoxia with increased O2 demand at 100% HFNC, NRB mask, desaturates to 83-84% with extremity movement and rolling in bed  BP 85/43 and 96/46   Activity Tolerance   Activity Tolerance Treatment limited secondary to medical complications (Comment)   Medical Staff Made Aware Anna Goss  Nurse Made Aware yes  Did speak with Dr Ravindra Ann prior to eval with goal for OOB  Medical limitations-unable to progress OOB 2* tolerance  Assessment   Prognosis Guarded   Problem List Decreased strength;Decreased endurance; Impaired balance;Decreased mobility; Decreased cognition   Assessment Yvonne Love is a 67 y o  female who presents with pmh of htn, niddm coming to hospital for sob/cough   Admitted with acute respiratory failure with hypoxia related to COVID 19 PNA-severe, sepsis, superimposed bacterial community acquired PNA, volume overload, acute metabolic/toxic encephalopathy, + RLE DVT  NO PE on CT  PT consulted  Up with assist orders  Prior to admission reported to be independent with ADLS, ambulating without AD in home, residing with family in 2 story home  Currently presents with functional limitations related to medical status  Decreased activity tolerance with significant O2 demand on 100% HFNC + NRB mask and hypotension with increased respiration rate up to 57 with extremity movement  Decreased generalized strength and functional mobility related to medical limitations  Decline from independent PLOF  Evaluation limited to bedlevel extremity ROM for assessment and repositioning in sidelying  BP end of session 96/46, O2 sat on HFNC and NRB 87%-90%  Will benefit from skilled PT in order to address impairments and progress with mobility and medical status improves  The patient's AM-PAC Basic Mobility Inpatient Short Form Raw Score is 13  A Raw score of less than or equal to 16 suggests the patient may benefit from discharge to post-acute rehabilitation services  Please also refer to the recommendation of the Physical Therapist for safe discharge planning  At this time would consider STR  ? Appropriateness for LTAC? Will follow  Barriers to Discharge Inaccessible home environment   Goals   Patient Goals none stated   STG Expiration Date 01/17/22   Short Term Goal #1 14 days: 1)  Pt will perform bed mobility with Wilberto demonstrating appropriate technique 100% of the time in order to improve function  2)  Further assess functional mobility and revise goals as appropriate  3)  Improve overall strength1/2 grade in order to optimize ability to perform functional tasks and reduce fall risk  4) Increase activity tolerance to 30 minutes in order to improve endurance to functional tasks  5) PT for ongoing patient and family/caregiver education, DME needs and d/c planning in order to promote highest level of function in least restrictive environment  Plan   Treatment/Interventions Functional transfer training;LE strengthening/ROM; Therapeutic exercise; Endurance training;Patient/family training;Equipment eval/education; Bed mobility; Compensatory technique education;Continued evaluation;Spoke to nursing;Gait training;Spoke to MD   PT Frequency 3-5x/wk   Recommendation   PT Discharge Recommendation Post acute rehabilitation services  (? LTACH)   Equipment Recommended   (monitor)   AM-PAC Basic Mobility Inpatient   Turning in Bed Without Bedrails 3   Lying on Back to Sitting on Edge of Flat Bed 2   Moving Bed to Chair 2   Standing Up From Chair 2   Walk in Room 2   Climb 3-5 Stairs 2   Basic Mobility Inpatient Raw Score 13   Basic Mobility Standardized Score 33 99   Highest Level Of Mobility   -[facility] Goal 4: Move to chair/commode   -[facility] Highest Level of Mobility 2: Bed activities/Dependent transfer   -HL Goal Achieved No   End of Consult   Patient Position at End of Consult Supine;Bed/Chair alarm activated; All needs within reach  (R sidelying  )     Hx/personal factors: co-morbidities, inaccessible home, advanced age, mutliple lines, telemetry, fall risk, assist w/ ADL's and O2  Examination: dec mobility, dec balance, dec endurance, dec amb, risk for falls, impairements in locomotion, musculoskeletal, balance, endurance, posture, coordination  Clinical: unpredictable (ongoing medical status, abnormal lab values and risk for falls), hypoxia, hypotension, increased O2 demands, COVID    Complexity: high      Aurelio Pond, PT

## 2022-01-03 NOTE — PLAN OF CARE
Problem: PHYSICAL THERAPY ADULT  Goal: Performs mobility at highest level of function for planned discharge setting  See evaluation for individualized goals  Description: Treatment/Interventions: Functional transfer training,LE strengthening/ROM,Therapeutic exercise,Endurance training,Patient/family training,Equipment eval/education,Bed mobility,Compensatory technique education,Continued evaluation,Spoke to nursing,Gait training,Spoke to MD  Equipment Recommended:  (monitor)       See flowsheet documentation for full assessment, interventions and recommendations  Note: Prognosis: Guarded  Problem List: Decreased strength,Decreased endurance,Impaired balance,Decreased mobility,Decreased cognition  Assessment: Myra Maldonado is a 67 y o  female who presents with pmh of htn, niddm coming to hospital for sob/cough  Admitted with acute respiratory failure with hypoxia related to COVID 19 PNA-severe, sepsis, superimposed bacterial community acquired PNA, volume overload, acute metabolic/toxic encephalopathy, + RLE DVT  NO PE on CT  PT consulted  Up with assist orders  Prior to admission reported to be independent with ADLS, ambulating without AD in home, residing with family in 2 story home  Currently presents with functional limitations related to medical status  Decreased activity tolerance with significant O2 demand on 100% HFNC + NRB mask and hypotension with increased respiration rate up to 57 with extremity movement  Decreased generalized strength and functional mobility related to medical limitations  Decline from independent PLOF  Evaluation limited to bedlevel extremity ROM for assessment and repositioning in sidelying  BP end of session 96/46, O2 sat on HFNC and NRB 87%-90%  Will benefit from skilled PT in order to address impairments and progress with mobility and medical status improves  The patient's AM-PAC Basic Mobility Inpatient Short Form Raw Score is 13   A Raw score of less than or equal to 16 suggests the patient may benefit from discharge to post-acute rehabilitation services  Please also refer to the recommendation of the Physical Therapist for safe discharge planning  At this time would consider STR  ? Appropriateness for LTAC? Will follow  Barriers to Discharge: Inaccessible home environment        PT Discharge Recommendation: Post acute rehabilitation services (? LTACH)          See flowsheet documentation for full assessment

## 2022-01-03 NOTE — ASSESSMENT & PLAN NOTE
Lab Results   Component Value Date    HGBA1C 9 2 (H) 05/26/2021     Recent Labs     01/02/22  1128 01/02/22  1610 01/02/22 2027   POCGLU 103 120 234*       Blood Sugar Average: Last 72 hrs:  (P) 545 [id number]   Lantus 30U qhs, increased 1/1/22  Continue SSI  Continue accuchecks  Avoid hypoglycemia

## 2022-01-03 NOTE — OCCUPATIONAL THERAPY NOTE
Occupational Therapy         Patient Name: Damien Louie  HVLOP'V Date: 1/3/2022    Attempted tx intervention today, but not medically appropriate  Will continue when appropriate   Neva Lung, OT

## 2022-01-04 PROBLEM — I82.461 ACUTE DEEP VEIN THROMBOSIS (DVT) OF CALF MUSCLE VEIN OF RIGHT LOWER EXTREMITY (HCC): Status: ACTIVE | Noted: 2022-01-01

## 2022-01-04 NOTE — PROGRESS NOTES
[address] [facility]  Progress Note - Hima Shelley [birth year], 67 y o  female MRN: [id number]  Unit/Bed#: ICU 07 Encounter: [id number]  Primary Care Provider: Angela Claudio MD   Date and time admitted to hospital: 12/28/2021  5:16 PM    * Acute respiratory failure with hypoxia [facility])  Assessment & Plan  Patient presented with acute respiratory failure with hypoxia secondary to bilateral COVID pneumonia   Initially requiring 15 L mid flow  Patient was placed on the COVID treatment protocol, and escalated to [facility] category of disease  Currently on BiPAP at 18/8, FiO2 100%  Continue COVID treatment as below  Wean O2 as tolerated  Respiratory protocol in place  Airway clearance protocol       Pneumonia due to COVID-19 virus  Assessment & Plan  Patient presented with bilateral pneumonia secondary to COVID-19, unvaccinated  Initially was in the [facility] category of disease requiring 15 L mid flow  Continue COVID treatment protocol in escalate to severe disease category  Remdesivir discontinued 12/31   Continue Decadron day #8--> Day 7 of high dose  Continue Heparin infusion  Ceftriaxone and Doxycycline discontinued on 1/3/21  Baricitinib day 7/14   -->249-->252 5--> 53  Lipitor back to home dose of 20mg   Started on Veletri on 01/03/21      Sepsis [facility])  Assessment & Plan  Patient presented with sepsis,POA with tachypnea/significant leuckocytosis  Started on the COVID treatment protocol with remdesivir/dexamethasone  Remdesevir discontinued  Antibiotics discontinued after 7 days and Procalcitonin negative x2  Encourage incentive spirometry   Airway clearance protocol    Acute deep vein thrombosis (DVT) of calf muscle vein of right lower extremity ([facility] [address] )  Assessment & Plan  Noted on lower extremity duplex ultrasound  Patient currently on heparin drip  Consider switching to therapeutic Lovenox      Essential hypertension  Assessment & Plan  Home meds include Losartan and Chlorthalidone  Home meds currently on hold due to low blood pressure      Type 2 diabetes mellitus without complication, without long-term current use of insulin [facility])  Assessment & Plan  Lab Results   Component Value Date    HGBA1C 9 2 (H) 2021     Recent Labs     22  1215 22  1654 22  2102   POCGLU 128 111 168*       Blood Sugar Average: Last 72 hrs:  (P) 011 [id number]   Lantus 30U qhs, increased 22  Continue SSI  Continue accuchecks  Avoid hypoglycemia    ----------------------------------------------------------------------------------------  HPI/24hr events:  Hospital day 7  Patient took off her HFNC and NRB, with O2 sats dropping into the 40s%  Was placed on BiPAP, 18/, FiO2 100%  Had an episode of nonsustained ventricular tachycardia at approximately 4:30am   On 0 3 mcg per kg per hour of Precedex  Patient appropriate for transfer out of the ICU today?: No  Disposition: Continue Critical Care   Code Status: Level 1 - Full Code  ---------------------------------------------------------------------------------------  SUBJECTIVE  Patient is in acute distress at this time, tachypneic and anxious  Complains of pain in her throat, points to the sternoclavicular junction  States that this has been present for the past 10 days       Review of Systems  Review of systems was reviewed and negative unless stated above in HPI/24-hour events   ---------------------------------------------------------------------------------------  OBJECTIVE    Vitals   Vitals:    22 0313 22 0413 22 0430 22 0513   BP: 116/60 102/56  (!) 79/48   Pulse: 78 82  70   Resp: (!) 38 (!) 43  (!) 36   Temp:       TempSrc:       SpO2: (!) 87% 92% (!) 89%    Weight:       Height:         Temp (24hrs), Av 8 °F (37 1 °C), Min:97 2 °F (36 2 °C), Max:100 9 °F (38 3 °C)  Current: Temperature: (!) 97 2 °F (36 2 °C)          Respiratory:  SpO2: SpO2: (!) 89 %    Nasal Cannula O2 Flow Rate (L/min): 60 L/min    Invasive/non-invasive ventilation settings   Respiratory  Report   Lab Data (Last 4 hours)    None         O2/Vent Data (Last 4 hours)      01/04 0430          Non-Invasive Ventilation Mode BiPAP                   Physical Exam  Vitals and nursing note reviewed  Constitutional:       General: She is in acute distress  Appearance: She is well-developed  She is ill-appearing  She is not toxic-appearing or diaphoretic  HENT:      Head: Normocephalic and atraumatic  Eyes:      Extraocular Movements: Extraocular movements intact  Conjunctiva/sclera: Conjunctivae normal    Cardiovascular:      Rate and Rhythm: Normal rate and regular rhythm  Pulses: Normal pulses  Heart sounds: Normal heart sounds  No murmur heard  Pulmonary:      Effort: Tachypnea and respiratory distress present  Breath sounds: Decreased breath sounds present  Chest:      Chest wall: No tenderness  Abdominal:      General: Bowel sounds are normal       Palpations: Abdomen is soft  Tenderness: There is no abdominal tenderness  Musculoskeletal:      Cervical back: Normal range of motion and neck supple  Right lower leg: No tenderness  No edema  Left lower leg: No tenderness  No edema  Skin:     General: Skin is warm and dry  Neurological:      Mental Status: She is alert  Psychiatric:         Mood and Affect: Mood is anxious  Behavior: Behavior is agitated               Laboratory and Diagnostics:  Results from last 7 days   Lab Units 01/04/22  0531 01/03/22  0346 01/02/22  0355 01/01/22  0451 12/31/21  0553 12/30/21  0428 12/29/21  0637 12/28/21  1807 12/28/21  1807   WBC Thousand/uL 22 13* 19 12* 19 37* 18 97* 20 19* 17 45* 17 47*   < > 21 43*   HEMOGLOBIN g/dL 12 8 11 8 11 3* 10 9* 11 7 12 3 12 8   < > 15 3   HEMATOCRIT % 39 2 35 6 35 2 32 2* 34 6* 36 6 37 9   < > 44 0   PLATELETS Thousands/uL 217 182 215 184 179 154 158   < > 191   NEUTROS PCT %  --   --  91*  --   --   -- --   --   --    BANDS PCT %  --   --   --   --   --   --   --   --  2   MONOS PCT %  --   --  5  --   --   --   --   --   --    MONO PCT %  --   --   --   --   --   --   --   --  1*    < > = values in this interval not displayed  Results from last 7 days   Lab Units 01/04/22  0531 01/03/22  0346 01/02/22  0355 01/01/22  0426 12/31/21  0553 12/30/21  0428 12/29/21  0637 12/28/21 2120 12/28/21 2120   SODIUM mmol/L 144 141 141 141 146* 141 143   < > 130*   POTASSIUM mmol/L 4 6 4 8 4 5 5 2 4 4 4 1 3 1*   < > 4 0   CHLORIDE mmol/L 108 106 107 107 107 102 109*   < > 89*   CO2 mmol/L 29 30 28 28 25 24 16*   < > 24   ANION GAP mmol/L 7 5 6 6 14* 15* 18*   < > 17*   BUN mg/dL 33* 35* 34* 34* 36* 36* 22   < > 33*   CREATININE mg/dL 0 87 0 79 0 97 0 87 0 99 1 13 0 76   < > 1 32*   CALCIUM mg/dL 8 0* 8 0* 8 0* 7 6* 7 8* 7 5* 5 4*   < > 7 6*   GLUCOSE RANDOM mg/dL 103 159* 216* 216* 233* 239* 143*   < > 216*   ALT U/L 18  --   --  19 20 24 16  --  26   AST U/L 21  --   --  31 19 24 17  --  28   ALK PHOS U/L 137*  --   --  118* 130* 131* 89  --  146*   ALBUMIN g/dL 1 7*  --   --  1 4* 1 7* 1 5* 1 1*  --  2 0*   TOTAL BILIRUBIN mg/dL 0 65  --   --  0 52 0 44 0 43 0 37  --  0 71    < > = values in this interval not displayed  Results from last 7 days   Lab Units 01/04/22  0531 01/03/22  1007 01/03/22  0343 01/02/22  1817 01/02/22  1026 01/02/22  0849 01/02/22  0144 12/29/21  1930 12/29/21  1213 12/29/21  1052 12/29/21  1052   INR   --   --   --   --   --   --   --   --  1 25*  --  1 51*   PTT seconds 74* 73* 65* 37 160* >210* 51*   < > 130*   < > >210*    < > = values in this interval not displayed            Results from last 7 days   Lab Units 12/29/21  0637 12/29/21  0013 12/28/21  2359 12/28/21  2120   LACTIC ACID mmol/L 1 5 2 0 2 0 3 3*     ABG:    VBG:    Results from last 7 days   Lab Units 01/02/22  0358 01/01/22  0708 12/30/21  0428 12/29/21  1213 12/29/21  0637 12/28/21  1807   PROCALCITONIN ng/ml 0 08 0 14 0 49* 0 89* 0 70* 0 98*       Micro  Results from last 7 days   Lab Units 12/28/21  1807 12/28/21  1806   BLOOD CULTURE  No Growth After 5 Days  No Growth After 5 Days  EKG: Sinus rhythm, HR 75  Imaging: I have personally reviewed pertinent reports  and I have personally reviewed pertinent films in PACS    Intake and Output  I/O       01/02 0701  01/03 0700 01/03 0701  01/04 0700    P  O  0 100    I V  (mL/kg) 147 2 (2 5) 97 1 (1 6)    Total Intake(mL/kg) 147 2 (2 5) 197 1 (3 3)    Urine (mL/kg/hr) 1225 (0 9) 400 (0 3)    Total Output 1225 400    Net -1077 8 -202 9          Unmeasured Urine Occurrence  1 x          Height and Weights   Height: 4' 11 5" (151 1 cm)     Body mass index is 25 88 kg/m²  Weight (last 2 days)     Date/Time Weight    01/03/22 0415 59 1 (130 29)            Nutrition       Diet Orders   (From admission, onward)             Start     Ordered    12/30/21 1234  Diet Dysphagia/Modified Consistency; Dysphagia 2-Mechanical Soft; Thin Liquid; Consistent Carbohydrate Diet Level 1 (4 carb servings/60 grams CHO/meal)  Diet effective now        References:    Nutrtion Support Algorithm Enteral vs  Parenteral   Question Answer Comment   Diet Type Dysphagia/Modified Consistency    Dysphagia/Modified Consistency Dysphagia 2-Mechanical Soft    Liquid Modifier Thin Liquid    Other Restriction(s): Consistent Carbohydrate Diet Level 1 (4 carb servings/60 grams CHO/meal)    RD to adjust diet per protocol?  Yes        12/30/21 1233                  Active Medications  Scheduled Meds:  Current Facility-Administered Medications   Medication Dose Route Frequency Provider Last Rate    acetaminophen  650 mg Oral Q6H PRN Eden Domingo MD      aspirin  81 mg Oral Daily Eden Domingo MD      atorvastatin  20 mg Oral Daily With Chandrakant Botello MD      Baricitinib  4 mg Oral Q24H ABIEL Blair      benzonatate  100 mg Oral TID PRN ABIEL Neri      calcium carbonate  500 mg Oral BID PRN Lucas Sykes MD      dexamethasone  0 1 mg/kg Intravenous Q12H Carole Pantoja MD      dexmedetomidine  0 1-0 7 mcg/kg/hr Intravenous Titrated Santhosh Martines MD 0 2 mcg/kg/hr (01/04/22 0541)    dextromethorphan-guaiFENesin  10 mL Oral Q4H PRN Bender Mount, CRNP      epoprostenol  6 25-50 ng/kg/min (Ideal) Inhalation Titrated Rafita Navdeep, TREVONNP 50 ng/kg/min (01/03/22 2253)    famotidine  20 mg Oral Daily Lucas Sykes MD      heparin (porcine)  3-20 Units/kg/hr (Order-Specific) Intravenous Titrated Lucas Sykes MD 9 Units/kg/hr (01/02/22 2316)    heparin (porcine)  1,800 Units Intravenous Q1H PRN Lucas Sykes MD      heparin (porcine)  3,600 Units Intravenous Q1H PRN Lucas Sykes MD      insulin glargine  30 Units Subcutaneous HS Chris Siegel MD      insulin lispro  1-5 Units Subcutaneous 4x Daily (AC & HS) Santhosh Martines MD      lidocaine  1 patch Topical Daily Lucas Sykes MD      melatonin  6 mg Oral HS Lucas Sykes MD      ondansetron  4 mg Intravenous Q6H PRN Lucas Sykes MD      oxymetazoline  2 spray Each Nare Q12H [address] Malgorzata Daring, ABIEL      sodium chloride  4 mL Nebulization Q6H Bender Mount, CRNP       Continuous Infusions:  dexmedetomidine, 0 1-0 7 mcg/kg/hr, Last Rate: 0 2 mcg/kg/hr (01/04/22 0541)  epoprostenol, 6 25-50 ng/kg/min (Ideal), Last Rate: 50 ng/kg/min (01/03/22 2253)  heparin (porcine), 3-20 Units/kg/hr (Order-Specific), Last Rate: 9 Units/kg/hr (01/02/22 2316)      PRN Meds:   acetaminophen, 650 mg, Q6H PRN  benzonatate, 100 mg, TID PRN  calcium carbonate, 500 mg, BID PRN  dextromethorphan-guaiFENesin, 10 mL, Q4H PRN  heparin (porcine), 1,800 Units, Q1H PRN  heparin (porcine), 3,600 Units, Q1H PRN  ondansetron, 4 mg, Q6H PRN        Invasive Devices Review  Invasive Devices  Report    Peripheral Intravenous Line            Peripheral IV 12/29/21 Left;Upper;Ventral (anterior) Arm 5 days    Peripheral IV 01/01/22 Left Wrist 2 days                Rationale for remaining devices:Severity of illness  ---------------------------------------------------------------------------------------  Advance Directive and Living Will:      Power of :    POLST:    ---------------------------------------------------------------------------------------  Care Time Delivered:   No Critical Care time spent       Tony Muñoz MD      Portions of the record may have been created with voice recognition software  Occasional wrong word or "sound a like" substitutions may have occurred due to the inherent limitations of voice recognition software    Read the chart carefully and recognize, using context, where substitutions have occurred

## 2022-01-04 NOTE — ASSESSMENT & PLAN NOTE
Lab Results   Component Value Date    HGBA1C 9 2 (H) 05/26/2021     Recent Labs     01/03/22  1215 01/03/22  1654 01/03/22  2102   POCGLU 128 111 168*       Blood Sugar Average: Last 72 hrs:  (P) 539 [id number]   Lantus 30U qhs, increased 1/1/22  Continue SSI  Continue accuchecks  Avoid hypoglycemia

## 2022-01-04 NOTE — ASSESSMENT & PLAN NOTE
Patient presented with bilateral pneumonia secondary to COVID-19, unvaccinated  Initially was in the [facility] category of disease requiring 15 L mid flow  Continue COVID treatment protocol in escalate to severe disease category  Remdesivir discontinued 12/31   Continue Decadron day #8--> Day 7 of high dose  Continue Heparin infusion  Ceftriaxone and Doxycycline discontinued on 1/3/21  Baricitinib day 7/14   -->249-->252 5--> 53  Lipitor back to home dose of 20mg   Started on [place] on 01/03/21

## 2022-01-04 NOTE — PLAN OF CARE
Problem: Potential for Falls  Goal: Patient will remain free of falls  Description: INTERVENTIONS:  - Educate patient/family on patient safety including physical limitations  - Instruct patient to call for assistance with activity   - Consult OT/PT to assist with strengthening/mobility   - Keep Call bell within reach  - Keep bed low and locked with side rails adjusted as appropriate  - Keep care items and personal belongings within reach  - Initiate and maintain comfort rounds  - Make Fall Risk Sign visible to staff  - Offer Toileting every 2 Hours, in advance of need  - Initiate/Maintain bed alarm  - Obtain necessary fall risk management equipment: bed alarm  - Apply yellow socks and bracelet for high fall risk patients  - Consider moving patient to room near nurses station  Outcome: Progressing     Problem: Prexisting or High Potential for Compromised Skin Integrity  Goal: Skin integrity is maintained or improved  Description: INTERVENTIONS:  - Identify patients at risk for skin breakdown  - Assess and monitor skin integrity  - Assess and monitor nutrition and hydration status  - Monitor labs   - Assess for incontinence   - Turn and reposition patient  - Assist with mobility/ambulation  - Relieve pressure over bony prominences  - Avoid friction and shearing  - Provide appropriate hygiene as needed including keeping skin clean and dry  - Evaluate need for skin moisturizer/barrier cream  - Collaborate with interdisciplinary team   - Patient/family teaching  - Consider wound care consult   Outcome: Progressing

## 2022-01-04 NOTE — ASSESSMENT & PLAN NOTE
Noted on lower extremity duplex ultrasound  Patient currently on heparin drip  Consider switching to therapeutic Lovenox

## 2022-01-04 NOTE — ASSESSMENT & PLAN NOTE
Patient presented with acute respiratory failure with hypoxia secondary to bilateral COVID pneumonia   Initially requiring 15 L mid flow  Patient was placed on the COVID treatment protocol, and escalated to [facility] category of disease  Currently on BiPAP at 18/8, FiO2 100%  Continue COVID treatment as below  Wean O2 as tolerated  Respiratory protocol in place  Airway clearance protocol

## 2022-01-04 NOTE — ASSESSMENT & PLAN NOTE
Patient presented with sepsis,POA with tachypnea/significant leuckocytosis  Started on the COVID treatment protocol with remdesivir/dexamethasone  Remdesevir discontinued  Antibiotics discontinued after 7 days and Procalcitonin negative x2  Encourage incentive spirometry   Airway clearance protocol

## 2022-01-05 NOTE — ASSESSMENT & PLAN NOTE
Patient presented with sepsis,POA with tachypnea/significant leuckocytosis  Started on the COVID treatment protocol with remdesivir/dexamethasone  Remdesevir discontinued  Antibiotics discontinued after 7 days and Procalcitonin negative x2  Continue trending fever curves and WBC  Maintain MAPs>65

## 2022-01-05 NOTE — ASSESSMENT & PLAN NOTE
Noted on lower extremity duplex ultrasound  Patient currently on heparin drip  Started on therapeutic Lovenox, will continue at this time

## 2022-01-05 NOTE — PROGRESS NOTES
[address] 47  Progress Note - Darren Yeh [birth year], 67 y o  female MRN: [id number]  Unit/Bed#: ICU 07 Encounter: [id number]  Primary Care Provider: Pamella Harvey MD   Date and time admitted to hospital: 12/28/2021  5:16 PM    * Acute respiratory failure with hypoxia [facility])  Assessment & Plan  Patient presented with acute respiratory failure with hypoxia secondary to bilateral COVID pneumonia   Initially requiring 15 L mid flow  Patient was placed on the COVID treatment protocol, and escalated to [facility] category of disease  Currently on BiPAP at 18/8, FiO2 100% after desaturating on HFNC with NRB overnight  Continue COVID treatment as below  Wean O2 as tolerated  Respiratory protocol in place  Airway clearance protocol   Encourage Incentive Spirometry          Pneumonia due to COVID-19 virus  Assessment & Plan  Patient presented with bilateral pneumonia secondary to COVID-19, unvaccinated  Initially was in the [facility] category of disease requiring 15 L mid flow  Continue COVID treatment protocol in escalate to severe disease category  Remdesivir discontinued 12/31   Continue Decadron day #9--> Day 8 of high dose  Patient transitioned to therapeutic Lovenox  Ceftriaxone and Doxycycline discontinued on 1/3/21  Baricitinib day 8/14   -->249-->252 5--> 53  Lipitor back to home dose of 20mg   Started on Ve[place]ri on 01/03/21        Sepsis [facility])  Assessment & Plan  Patient presented with sepsis,POA with tachypnea/significant leuckocytosis  Started on the COVID treatment protocol with remdesivir/dexamethasone  Remdesevir discontinued  Antibiotics discontinued after 7 days and Procalcitonin negative x2  Continue trending fever curves and WBC  Maintain MAPs>65    Acute deep vein thrombosis (DVT) of calf muscle vein of right lower extremity ([address] )  Assessment & Plan  Patient admitted on 12/28  Lower extremity duplex ultrasound performed on 1/3/21 showed right lower limb evidence of acute DVT noted in a soleal calf vein  Started on therapeutic Lovenox, will continue at this time  Essential hypertension  Assessment & Plan  Home meds include Losartan and Chlorthalidone  Home meds currently on hold due to low blood pressure      Type 2 diabetes mellitus without complication, without long-term current use of insulin [facility])  Assessment & Plan  Lab Results   Component Value Date    HGBA1C 9 2 (H) 05/26/2021     Recent Labs     01/04/22  1555 01/04/22 2051 01/05/22  0943   POCGLU 101 132 62*       Blood Sugar Average: Last 72 hrs:  (P) 543 [id number]   Lantus 30U qhs, increased 1/1/22  Continue SSI  Continue accuchecks  Avoid hypoglycemia    ----------------------------------------------------------------------------------------  HPI/24hr events: Hospital day 9, ICU day 8  Overnight, patient's O2 saturation dropped into the 70s while she was on HFNC with NRB  She was placed on BiPAP with minimal improvement  She also reported chest pain  EKG showed no ischemic changes, troponin noted to be WNL  Patient appropriate for transfer out of the ICU today?: No  Disposition: Continue Critical Care   Code Status: Level 1 - Full Code  ---------------------------------------------------------------------------------------  SUBJECTIVE  Patient continues to endorse chest pain  States that she is very thirsty and wants water  When taken off BiPAP and given water, patient desaturated into the 40s, was immediately placed back on BiPAP  Goals of care discussion continues with patient and her family  She has no other complaints today    Review of Systems  Review of systems was unable to be performed secondary to Patient is very agitated and demands water, refusing to answer other questions   ---------------------------------------------------------------------------------------  OBJECTIVE    Vitals   Vitals:    01/05/22 0615 01/05/22 0845 01/05/22 1050 01/05/22 1116   BP: 116/63      Pulse: 90      Resp: (!) 35 Temp:  98 3 °F (36 8 °C)  98 °F (36 7 °C)   TempSrc:  Temporal  Temporal   SpO2: (!) 89%  94%    Weight:       Height:         Temp (24hrs), Av 8 °F (36 6 °C), Min:97 2 °F (36 2 °C), Max:98 3 °F (36 8 °C)  Current: Temperature: 98 °F (36 7 °C)          Respiratory:  SpO2: SpO2: 94 %  Nasal Cannula O2 Flow Rate (L/min): 60 L/min    Invasive/non-invasive ventilation settings   Respiratory  Report   Lab Data (Last 4 hours)    None         O2/Vent Data (Last 4 hours)       0836  1050        Non-Invasive Ventilation Mode BiPAP BiPAP                  Physical Exam  Vitals and nursing note reviewed  Constitutional:       General: She is in acute distress  Appearance: She is well-developed  She is ill-appearing  She is not toxic-appearing or diaphoretic  HENT:      Head: Normocephalic and atraumatic  Eyes:      Extraocular Movements: Extraocular movements intact  Conjunctiva/sclera: Conjunctivae normal    Cardiovascular:      Rate and Rhythm: Normal rate and regular rhythm  Pulses: Normal pulses  Heart sounds: Normal heart sounds  No murmur heard  Pulmonary:      Effort: Tachypnea and respiratory distress present  Breath sounds: Decreased breath sounds present  Chest:      Chest wall: No tenderness  Abdominal:      General: Bowel sounds are normal       Palpations: Abdomen is soft  Tenderness: There is no abdominal tenderness  Musculoskeletal:      Cervical back: Normal range of motion and neck supple  Right lower leg: No tenderness  No edema  Left lower leg: No tenderness  No edema  Skin:     General: Skin is warm and dry  Neurological:      Mental Status: She is alert  Psychiatric:         Mood and Affect: Mood is anxious  Behavior: Behavior is agitated               Laboratory and Diagnostics:  Results from last 7 days   Lab Units 22  0445 22  0531 22  5216 22  0355 22  0451 21  3543 21  4604 WBC Thousand/uL 28 88* 22 13* 19 12* 19 37* 18 97* 20 19* 17 45*   HEMOGLOBIN g/dL 11 1* 12 8 11 8 11 3* 10 9* 11 7 12 3   HEMATOCRIT % 34 9 39 2 35 6 35 2 32 2* 34 6* 36 6   PLATELETS Thousands/uL 304 217 182 215 184 179 154   NEUTROS PCT %  --   --   --  91*  --   --   --    MONOS PCT %  --   --   --  5  --   --   --      Results from last 7 days   Lab Units 01/05/22  0716 01/04/22  0531 01/03/22  0346 01/02/22  0355 01/01/22  0426 12/31/21  0553 12/30/21  0428   SODIUM mmol/L 145 144 141 141 141 146* 141   POTASSIUM mmol/L 4 4 4 6 4 8 4 5 5 2 4 4 4 1   CHLORIDE mmol/L 108 108 106 107 107 107 102   CO2 mmol/L 27 29 30 28 28 25 24   ANION GAP mmol/L 10 7 5 6 6 14* 15*   BUN mg/dL 34* 33* 35* 34* 34* 36* 36*   CREATININE mg/dL 0 88 0 87 0 79 0 97 0 87 0 99 1 13   CALCIUM mg/dL 8 1* 8 0* 8 0* 8 0* 7 6* 7 8* 7 5*   GLUCOSE RANDOM mg/dL 81 103 159* 216* 216* 233* 239*   ALT U/L 16 18  --   --  19 20 24   AST U/L 21 21  --   --  31 19 24   ALK PHOS U/L 146* 137*  --   --  118* 130* 131*   ALBUMIN g/dL 1 7* 1 7*  --   --  1 4* 1 7* 1 5*   TOTAL BILIRUBIN mg/dL 0 78 0 65  --   --  0 52 0 44 0 43          Results from last 7 days   Lab Units 01/04/22  0531 01/03/22  1007 01/03/22  0343 01/02/22  1817 01/02/22  1026 01/02/22  0849 01/02/22  0144 12/29/21  1930 12/29/21  1213   INR   --   --   --   --   --   --   --   --  1 25*   PTT seconds 74* 73* 65* 37 160* >210* 51*   < > 130*    < > = values in this interval not displayed  ABG:    VBG:    Results from last 7 days   Lab Units 01/02/22  0358 01/01/22  0708 12/30/21  0428 12/29/21  1213   PROCALCITONIN ng/ml 0 08 0 14 0 49* 0 89*       Micro        EKG: Sinus rhythm, HR 81  Imaging: I have personally reviewed pertinent reports  and I have personally reviewed pertinent films in PACS    Intake and Output  I/O       01/03 0701  01/04 0700 01/04 0701  01/05 0700    P  O  100 120    I V  (mL/kg) 97 1 (1 6) 172 4 (2 8)    Total Intake(mL/kg) 197 1 (3 3) 292 4 (4 7)    Urine (mL/kg/hr) 400 (0 3) 610 (0 4)    Total Output 400 610    Net -202 9 -317 6          Unmeasured Urine Occurrence 1 x           Height and Weights   Height: 4' 11 5" (151 1 cm)     Body mass index is 27 36 kg/m²  Weight (last 2 days)     Date/Time Weight    01/05/22 0437 62 5 (137 79)    01/03/22 0415 59 1 (130 29)            Nutrition       Diet Orders   (From admission, onward)             Start     Ordered    12/30/21 1234  Diet Dysphagia/Modified Consistency; Dysphagia 2-Mechanical Soft; Thin Liquid; Consistent Carbohydrate Diet Level 1 (4 carb servings/60 grams CHO/meal)  Diet effective now        References:    Nutrtion Support Algorithm Enteral vs  Parenteral   Question Answer Comment   Diet Type Dysphagia/Modified Consistency    Dysphagia/Modified Consistency Dysphagia 2-Mechanical Soft    Liquid Modifier Thin Liquid    Other Restriction(s): Consistent Carbohydrate Diet Level 1 (4 carb servings/60 grams CHO/meal)    RD to adjust diet per protocol?  Yes        12/30/21 1233                  Active Medications  Scheduled Meds:  Current Facility-Administered Medications   Medication Dose Route Frequency Provider Last Rate    acetaminophen  650 mg Oral Q6H PRN Eden Domingo MD      aspirin  81 mg Oral Daily Eden Domingo MD      atorvastatin  20 mg Oral Daily With Chandrakant Botello MD      Baricitinib  4 mg Oral Q24H ABIEL Blair      benzonatate  100 mg Oral TID PRN ABIEL Neri      calcium carbonate  500 mg Oral BID PRN Eden Domingo MD      dexamethasone  0 1 mg/kg Intravenous Q12H Jackie Torres MD      dexmedetomidine  0 1-0 7 mcg/kg/hr Intravenous Titrated Evette Theodore MD 0 4 mcg/kg/hr (01/05/22 0352)    dextromethorphan-guaiFENesin  10 mL Oral Q4H PRN ABIEL Neri      enoxaparin  1 mg/kg Subcutaneous Q12H [facility] Nicolas Yen MD      epoprostenol  6 25-50 ng/kg/min (Ideal) Inhalation Titrated ABIEL Sifuentes 50 ng/kg/min (01/05/22 9523)    famotidine  20 mg Oral Daily Bonita Jeffries MD      insulin glargine  30 Units Subcutaneous HS Johnson Bhatti MD      insulin lispro  1-5 Units Subcutaneous 4x Daily (AC & HS) Ruddy Matta MD      lidocaine  1 patch Topical Daily Bonita Jeffries MD      melatonin  6 mg Oral HS Bonita Jeffries MD      ondansetron  4 mg Intravenous Q6H PRN Bonita Jeffries MD      QUEtiapine  25 mg Oral HS Bisi Hicks MD      sodium chloride  4 mL Nebulization Q6H Alli Prim, CRBETZAIDA       Continuous Infusions:  dexmedetomidine, 0 1-0 7 mcg/kg/hr, Last Rate: 0 4 mcg/kg/hr (01/05/22 2498)  epoprostenol, 6 25-50 ng/kg/min (Ideal), Last Rate: 50 ng/kg/min (01/05/22 8192)      PRN Meds:   acetaminophen, 650 mg, Q6H PRN  benzonatate, 100 mg, TID PRN  calcium carbonate, 500 mg, BID PRN  dextromethorphan-guaiFENesin, 10 mL, Q4H PRN  ondansetron, 4 mg, Q6H PRN        Invasive Devices Review  Invasive Devices  Report    Peripheral Intravenous Line            Peripheral IV 12/29/21 Left;Upper;Ventral (anterior) Arm 6 days    Peripheral IV 01/01/22 Left Wrist 3 days                Rationale for remaining devices: IV access  ---------------------------------------------------------------------------------------  Advance Directive and Living Will:      Power of :    POLST:    ---------------------------------------------------------------------------------------  Care Time Delivered:   No Critical Care time spent       Ruddy Matta MD      Portions of the record may have been created with voice recognition software  Occasional wrong word or "sound a like" substitutions may have occurred due to the inherent limitations of voice recognition software    Read the chart carefully and recognize, using context, where substitutions have occurred

## 2022-01-05 NOTE — ASSESSMENT & PLAN NOTE
Lab Results   Component Value Date    HGBA1C 9 2 (H) 05/26/2021     Recent Labs     01/04/22  1555 01/04/22 2051 01/05/22  0943   POCGLU 101 132 62*       Blood Sugar Average: Last 72 hrs:  (P) 675 [id number]   Lantus 30U qhs, increased 1/1/22  Consider reducing Lantus to 20IU qhs  Continue SSI  Continue accuchecks  Avoid hypoglycemia

## 2022-01-05 NOTE — ASSESSMENT & PLAN NOTE
Patient admitted on 12/28  Lower extremity duplex ultrasound performed on 1/3/21 showed right lower limb evidence of acute DVT noted in a soleal calf vein  Started on therapeutic Lovenox, will continue at this time

## 2022-01-05 NOTE — PLAN OF CARE
Problem: MOBILITY - ADULT  Goal: Maintain or return to baseline ADL function  Description: INTERVENTIONS:  -  Assess patient's ability to carry out ADLs; assess patient's baseline for ADL function and identify physical deficits which impact ability to perform ADLs (bathing, care of mouth/teeth, toileting, grooming, dressing, etc )  - Assess/evaluate cause of self-care deficits   - Assess range of motion  - Assess patient's mobility; develop plan if impaired  - Assess patient's need for assistive devices and provide as appropriate  - Encourage maximum independence but intervene and supervise when necessary  - Involve family in performance of ADLs  - Assess for home care needs following discharge   - Consider OT consult to assist with ADL evaluation and planning for discharge  - Provide patient education as appropriate  Outcome: Progressing  Goal: Maintains/Returns to pre admission functional level  Description: INTERVENTIONS:  - Perform BMAT or MOVE assessment daily    - Set and communicate daily mobility goal to care team and patient/family/caregiver     - Collaborate with rehabilitation services on mobility goals if consulted  - Perform Range of Motion 4  Problem: Potential for Falls  Goal: Patient will remain free of falls  Description: INTERVENTIONS:  - Educate patient/family on patient safety including physical limitations  - Instruct patient to call for assistance with activity   - Consult OT/PT to assist with strengthening/mobility   - Keep Call bell within reach  - Keep bed low and locked with side rails adjusted as appropriate  - Keep care items and personal belongings within reach  - Initiate and maintain comfort rounds  - Make Fall Risk Sign visible to staff  - Offer Toileting every 2   Problem: INFECTION - ADULT  Goal: Absence or prevention of progression during hospitalization  Description: INTERVENTIONS:  - Assess and monitor for signs and symptoms of infection  - Monitor lab/diagnostic results  - Monitor all insertion sites, i e  indwelling lines, tubes, and drains  - Monitor endotracheal if appropriate and nasal secretions for changes in amount and color  - [place] appropriate cooling/warming therapies per order  - Administer medications as ordered  - Instruct and encourage patient and family to use good hand hygiene technique  - Identify and instruct in appropriate isolation precautions for identified infection/condition  Outcome: Progressing  Goal: Absence of fever/infection during neutropenic period  Description: INTERVENTIONS:  - Monitor WBC    Outcome: Progressing     Problem: Knowledge Deficit  Goal: Patient/family/caregiver demonstrates understanding of disease process, treatment plan, medications, and discharge instructions  Description: Complete learning assessment and assess knowledge base  Interventions:  - Provide teaching at level of understanding  - Provide teaching via preferred learning methods  Outcome: Progressing     Problem: Prexisting or High Potential for Compromised Skin Integrity  Goal: Skin integrity is maintained or improved  Description: INTERVENTIONS:  - Identify patients at risk for skin breakdown  - Assess and monitor skin integrity  - Assess and monitor nutrition and hydration status  - Monitor labs   - Assess for incontinence   - Turn and reposition patient  - Assist with mobility/ambulation  - Relieve pressure over bony prominences  - Avoid friction and shearing  - Provide appropriate hygiene as needed including keeping skin clean and dry  - Evaluate need for skin moisturizer/barrier cream  - Collaborate with interdisciplinary team   - Patient/family teaching  - Consider wound care consult   Outcome: Progressing     Problem: Nutrition/Hydration-ADULT  Goal: Nutrient/Hydration intake appropriate for improving, restoring or maintaining nutritional needs  Description: Monitor and assess patient's nutrition/hydration status for malnutrition   Collaborate with interdisciplinary team and initiate plan and interventions as ordered  Monitor patient's weight and dietary intake as ordered or per policy  Utilize nutrition screening tool and intervene as necessary  Determine patient's food preferences and provide high-protein, high-caloric foods as appropriate  INTERVENTIONS:  - Monitor oral intake, urinary output, labs, and treatment plans  - Assess nutrition and hydration status and recommend course of action  - Evaluate amount of meals eaten  - Assist patient with eating if necessary   - Allow adequate time for meals  - Recommend/ encourage appropriate diets, oral nutritional supplements, and vitamin/mineral supplements  - Order, calculate, and assess calorie counts as needed  - Recommend, monitor, and adjust tube feedings and TPN/PPN based on assessed needs  - Assess need for intravenous fluids  - Provide specific nutrition/hydration education as appropriate  - Include patient/family/caregiver in decisions related to nutrition  Outcome: Progressing   Hours, in advance of need  - Initiate/Maintain bed alarm    - Apply yellow socks and bracelet for high fall risk patients  - Consider moving patient to room near nurses station  Outcome: Progressing    times a day  - Reposition patient every 2 hours      - Record patient progress and toleration of activity level   Outcome: Progressing

## 2022-01-05 NOTE — ASSESSMENT & PLAN NOTE
Lab Results   Component Value Date    HGBA1C 9 2 (H) 05/26/2021     Recent Labs     01/04/22  1107 01/04/22  1555 01/04/22 2051   POCGLU 152* 101 132       Blood Sugar Average: Last 72 hrs:  (P) 141 [id number]   Lantus 30U qhs, increased 1/1/22  Continue SSI  Continue accuchecks  Avoid hypoglycemia

## 2022-01-05 NOTE — ASSESSMENT & PLAN NOTE
Patient presented with acute respiratory failure with hypoxia secondary to bilateral COVID pneumonia   Initially requiring 15 L mid flow  Patient was placed on the COVID treatment protocol, and escalated to [facility] category of disease  Currently on BiPAP at 18/8, FiO2 100% after desaturating on HFNC with NRB overnight  Continue COVID treatment as below  Wean O2 as tolerated  Respiratory protocol in place  Airway clearance protocol   Encourage Incentive Spirometry

## 2022-01-05 NOTE — ASSESSMENT & PLAN NOTE
Patient presented with bilateral pneumonia secondary to COVID-19, unvaccinated  Initially was in the [facility] category of disease requiring 15 L mid flow  Continue COVID treatment protocol in escalate to severe disease category  Remdesivir discontinued 12/31   Continue Decadron day #9--> Day 8 of high dose  Continue Heparin infusion  Ceftriaxone and Doxycycline discontinued on 1/3/21  Baricitinib day 8/14   -->249-->252 5--> 53  Lipitor back to home dose of 20mg   Started on [place] on 01/03/21

## 2022-01-05 NOTE — ASSESSMENT & PLAN NOTE
Patient presented with bilateral pneumonia secondary to COVID-19, unvaccinated  Initially was in the [facility] category of disease requiring 15 L mid flow  Continue COVID treatment protocol in escalate to severe disease category  Remdesivir discontinued 12/31   Continue Decadron day #10--> Day 9 of high dose  Patient transitioned to therapeutic Lovenox  Ceftriaxone and Doxycycline discontinued on 1/3/21  Baricitinib day 9/14   -->249-->252 5--> 53  Lipitor back to home dose of 20mg   Started on [place] on 01/03/21

## 2022-01-05 NOTE — ASSESSMENT & PLAN NOTE
Patient presented with acute respiratory failure with hypoxia secondary to bilateral COVID pneumonia   Initially requiring 15 L mid flow  Patient was placed on the COVID treatment protocol, and escalated to [facility] category of disease  Currently on BiPAP at 18/12, FiO2 100% after desaturating on HFNC with NRB overnight  Continue COVID treatment as below  Wean O2 as tolerated  Respiratory protocol in place  Airway clearance protocol   Encourage Incentive Spirometry

## 2022-01-06 NOTE — ASSESSMENT & PLAN NOTE
Patient presented with acute respiratory failure with hypoxia secondary to bilateral COVID pneumonia   Initially requiring 15 L mid flow  Patient was placed on the COVID treatment protocol, and escalated to [facility] category of disease  Currently on BiPAP at 18/12, FiO2 100%   Continue COVID treatment as below  Wean O2 as tolerated  Respiratory protocol in place  Airway clearance protocol   Encourage Incentive Spirometry

## 2022-01-06 NOTE — PHYSICAL THERAPY NOTE
PHYSICAL THERAPY NOTE          Patient Name: Damien Louie  QLBVN'B Date: 1/6/2022     Spoke with nursing  Not medically appropriate for PT treatment at this time  Will follow and progress with PT as appropriate     Gisela Rahman, PT

## 2022-01-06 NOTE — PLAN OF CARE
Problem: Nutrition/Hydration-ADULT  Goal: Nutrient/Hydration intake appropriate for improving, restoring or maintaining nutritional needs  Description: Monitor and assess patient's nutrition/hydration status for malnutrition  Collaborate with interdisciplinary team and initiate plan and interventions as ordered  Monitor patient's weight and dietary intake as ordered or per policy  Utilize nutrition screening tool and intervene as necessary  Determine patient's food preferences and provide high-protein, high-caloric foods as appropriate  INTERVENTIONS:  - Monitor oral intake, urinary output, labs, and treatment plans  - Assess nutrition and hydration status and recommend course of action  - Evaluate amount of meals eaten  - Assist patient with eating if necessary   - Allow adequate time for meals  - Recommend/ encourage appropriate diets, oral nutritional supplements, and vitamin/mineral supplements  - Order, calculate, and assess calorie counts as needed  - Recommend, monitor, and adjust tube feedings and TPN/PPN based on assessed needs  - Assess need for intravenous fluids  - Provide specific nutrition/hydration education as appropriate  - Include patient/family/caregiver in decisions related to nutrition  Outcome: Not Progressing   PT's po decreasing, will trial glucerna shakes BID to aid with pro, bernadette intake

## 2022-01-06 NOTE — ASSESSMENT & PLAN NOTE
POA with D-dimer on admission - 5  15  Patient asymptomatic  Initially placed on heparin gtt  Lower extremity duplex ultrasound performed on 1/3/21 showed right lower limb evidence of acute DVT noted in a soleal calf vein  Transitioned to therapeutic Lovenox, will continue at this time

## 2022-01-06 NOTE — OCCUPATIONAL THERAPY NOTE
Occupational Therapy         Patient Name: Amanda Hernandez  TYQQE'N Date: 1/6/2022    Pt currently not medically appropriate for tx intervention  Will continue when appropriate   Ever Aragon OT

## 2022-01-06 NOTE — ASSESSMENT & PLAN NOTE
Lab Results   Component Value Date    HGBA1C 9 2 (H) 05/26/2021     Recent Labs     01/06/22  1546 01/06/22  2135 01/07/22  0538   POCGLU 186* 216* 177*       Blood Sugar Average: Last 72 hrs:  (P) 361 [id number]   Continue Lantus 20 units q h s    Continue SSI  Continue accuchecks  Avoid hypoglycemia

## 2022-01-06 NOTE — PROGRESS NOTES
[address]  Progress Note - Zoila Darinel 6/34/1962, 67 y o  female MRN: [id number]  Unit/Bed#: ICU 07 Encounter: [id number]  Primary Care Provider: Mitzi Penn MD   Date and time admitted to hospital: 12/28/2021  5:16 PM    * Acute respiratory failure with hypoxia [facility])  Assessment & Plan  Patient presented with acute respiratory failure with hypoxia secondary to bilateral COVID pneumonia   Initially requiring 15 L mid flow  Patient was placed on the COVID treatment protocol, and escalated to [facility] category of disease  Currently on BiPAP at 18/12, FiO2 100% after desaturating on HFNC with NRB overnight  Continue COVID treatment as below  Wean O2 as tolerated  Respiratory protocol in place  Airway clearance protocol   Encourage Incentive Spirometry          Acute deep vein thrombosis (DVT) of calf muscle vein of right lower extremity ([address] )  Assessment & Plan  POA with D-dimer on admission - 5  15  Patient asymptomatic  Initially placed on heparin gtt  Lower extremity duplex ultrasound performed on 1/3/21 showed right lower limb evidence of acute DVT noted in a soleal calf vein  Transitioned to therapeutic Lovenox, will continue at this time      Pneumonia due to COVID-19 virus  Assessment & Plan  Patient presented with bilateral pneumonia secondary to COVID-19, unvaccinated  Initially was in the [facility] [place] category of disease requiring 15 L mid flow  Continue COVID treatment protocol in escalate to severe disease category  Remdesivir discontinued 12/31   Continue Decadron day #10--> Day 9 of high dose  Patient transitioned to therapeutic Lovenox  Ceftriaxone and Doxycycline discontinued on 1/3/21  Baricitinib day 9/14   -->249-->252 5--> 53  Lipitor back to home dose of 20mg   Started on Veletri on 01/03/21        Sepsis [facility])  Assessment & Plan  Patient presented with sepsis,POA with tachypnea/significant leuckocytosis  Started on the COVID treatment protocol with remdesivir/dexamethasone  Remdesevir discontinued  Antibiotics discontinued after 7 days and Procalcitonin negative x2  Continue trending fever curves and WBC  Maintain MAPs>65    Essential hypertension  Assessment & Plan  Home meds include Losartan and Chlorthalidone  Home meds currently on hold due to low blood pressure      Type 2 diabetes mellitus without complication, without long-term current use of insulin [facility])  Assessment & Plan  Lab Results   Component Value Date    HGBA1C 9 2 (H) 2021     Recent Labs     22  0647 22  0729   POCGLU 124 83 77       Blood Sugar Average: Last 72 hrs:  (P) 620 [id number]   Lantus 30U qhs, increased 22  Consider reducing Lantus to 20IU qhs  Continue SSI  Continue accuchecks  Avoid hypoglycemia    ----------------------------------------------------------------------------------------  HPI/24hr events:  ICU day 9  Patient was placed on BiPAP overnight after O2 sats dropped  Current BiPAP settings - , FiO2 100%  She continues to be anxious and has not slept  Poor P O  intake reported in the past 24h  Patient appropriate for transfer out of the ICU today?: No  Disposition: Continue Critical Care   Code Status: Level 1 - Full Code  ---------------------------------------------------------------------------------------  SUBJECTIVE  Patient endorses chest pain that has been present for the past 2 weeks  She also reports back pain  She has no other complaints at this time      Review of Systems  Review of systems was reviewed and negative unless stated above in HPI/24-hour events   ---------------------------------------------------------------------------------------  OBJECTIVE    Vitals   Vitals:    22 0415 22 0604 22 0715 22 0737   BP: 157/72      Pulse: 90      Resp: (!) 33      Temp:   97 7 °F (36 5 °C)    TempSrc:   Temporal    SpO2: (!) 87% 93%  92%   Weight:       Height:         Temp (24hrs), Av 7 °F (36 5 °C), Min:97 5 °F (36 4 °C), Max:98 °F (36 7 °C)  Current: Temperature: 97 7 °F (36 5 °C)          Respiratory:  SpO2: SpO2: 92 %  Nasal Cannula O2 Flow Rate (L/min): 60 L/min    Invasive/non-invasive ventilation settings   Respiratory  Report   Lab Data (Last 4 hours)    None         O2/Vent Data (Last 4 hours)      01/06 0737          Non-Invasive Ventilation Mode BiPAP                   Physical Exam  Vitals and nursing note reviewed  Constitutional:       General: She is not in acute distress  Appearance: She is well-developed  HENT:      Head: Normocephalic and atraumatic  Eyes:      Extraocular Movements: Extraocular movements intact  Conjunctiva/sclera: Conjunctivae normal    Cardiovascular:      Rate and Rhythm: Normal rate and regular rhythm  Pulses: Normal pulses  Heart sounds: Normal heart sounds  No murmur heard  Pulmonary:      Effort: Tachypnea and respiratory distress present  Breath sounds: Decreased breath sounds present  Abdominal:      Palpations: Abdomen is soft  Tenderness: There is no abdominal tenderness  Musculoskeletal:      Cervical back: Normal range of motion and neck supple  Right lower leg: No tenderness  No edema  Left lower leg: No tenderness  No edema  Skin:     General: Skin is warm and dry  Neurological:      Mental Status: She is alert  Psychiatric:         Mood and Affect: Mood is anxious               Laboratory and Diagnostics:  Results from last 7 days   Lab Units 01/06/22  0648 01/05/22  0445 01/04/22  0531 01/03/22  0346 01/02/22  0355 01/01/22  0451 12/31/21  0553   WBC Thousand/uL 23 35* 28 88* 22 13* 19 12* 19 37* 18 97* 20 19*   HEMOGLOBIN g/dL 13 5 11 1* 12 8 11 8 11 3* 10 9* 11 7   HEMATOCRIT % 41 9 34 9 39 2 35 6 35 2 32 2* 34 6*   PLATELETS Thousands/uL 276 304 217 182 215 184 179   NEUTROS PCT % 87*  --   --   --  91*  --   --    MONOS PCT % 7  --   --   --  5  --   --      Results from last 7 days Lab Units 01/06/22  2797 01/05/22  0716 01/04/22  0531 01/03/22  0346 01/02/22  0355 01/01/22  0426 12/31/21  0553   SODIUM mmol/L 146* 145 144 141 141 141 146*   POTASSIUM mmol/L 4 4 4 4 4 6 4 8 4 5 5 2 4 4   CHLORIDE mmol/L 108 108 108 106 107 107 107   CO2 mmol/L 28 27 29 30 28 28 25   ANION GAP mmol/L 10 10 7 5 6 6 14*   BUN mg/dL 44* 34* 33* 35* 34* 34* 36*   CREATININE mg/dL 0 99 0 88 0 87 0 79 0 97 0 87 0 99   CALCIUM mg/dL 8 3 8 1* 8 0* 8 0* 8 0* 7 6* 7 8*   GLUCOSE RANDOM mg/dL 68 81 103 159* 216* 216* 233*   ALT U/L  --  16 18  --   --  19 20   AST U/L  --  21 21  --   --  31 19   ALK PHOS U/L  --  146* 137*  --   --  118* 130*   ALBUMIN g/dL  --  1 7* 1 7*  --   --  1 4* 1 7*   TOTAL BILIRUBIN mg/dL  --  0 78 0 65  --   --  0 52 0 44          Results from last 7 days   Lab Units 01/04/22  0531 01/03/22  1007 01/03/22  0343 01/02/22  1817 01/02/22  1026 01/02/22  0849 01/02/22  0144   PTT seconds 74* 73* 65* 37 160* >210* 51*              ABG:    VBG:    Results from last 7 days   Lab Units 01/02/22  0358 01/01/22  0708   PROCALCITONIN ng/ml 0 08 0 14       Micro        EKG: Sinus tachycardia, heart rate 108  Imaging: I have personally reviewed pertinent reports  and I have personally reviewed pertinent films in PACS    Intake and Output  I/O       01/04 0701 01/05 0700 01/05 0701 01/06 0700    P  O  120     I V  (mL/kg) 172 4 (2 8) 205 3 (3 3)    Total Intake(mL/kg) 292 4 (4 7) 205 3 (3 3)    Urine (mL/kg/hr) 610 (0 4) 450 (0 3)    Total Output 610 450    Net -317 6 -244 7          Unmeasured Urine Occurrence  1 x    Unmeasured Stool Occurrence  1 x          Height and Weights   Height: 4' 11 5" (151 1 cm)     Body mass index is 27 36 kg/m²  Weight (last 2 days)     Date/Time Weight    01/05/22 0437 62 5 (137 79)            Nutrition       Diet Orders   (From admission, onward)             Start     Ordered    12/30/21 1234  Diet Dysphagia/Modified Consistency; Dysphagia 2-Mechanical Soft;  Thin Liquid; Consistent Carbohydrate Diet Level 1 (4 carb servings/60 grams CHO/meal)  Diet effective now        References:    Nutrtion Support Algorithm Enteral vs  Parenteral   Question Answer Comment   Diet Type Dysphagia/Modified Consistency    Dysphagia/Modified Consistency Dysphagia 2-Mechanical Soft    Liquid Modifier Thin Liquid    Other Restriction(s): Consistent Carbohydrate Diet Level 1 (4 carb servings/60 grams CHO/meal)    RD to adjust diet per protocol?  Yes        12/30/21 1233                  Active Medications  Scheduled Meds:  Current Facility-Administered Medications   Medication Dose Route Frequency Provider Last Rate    acetaminophen  650 mg Oral Q6H PRN Carlos Rose MD      aspirin  81 mg Oral Daily Carlos Rose MD      atorvastatin  20 mg Oral Daily With Arnulfo Moralez MD      Baricitinib  4 mg Oral Q24H ABIEL Mckinney      benzonatate  100 mg Oral TID PRN Rosiland Fat, CRNP      calcium carbonate  500 mg Oral BID PRN Carlos Rose MD      dexamethasone  0 1 mg/kg Intravenous Q12H Karissa Griffin MD      dexmedetomidine  0 1-0 7 mcg/kg/hr Intravenous Titrated Sun Culp MD 0 7 mcg/kg/hr (01/05/22 1000)    dextromethorphan-guaiFENesin  10 mL Oral Q4H PRN Rosiland Fat, CRNP      enoxaparin  1 mg/kg Subcutaneous Q12H [address] Precious Wu MD      epoprostenol  6 25-50 ng/kg/min (Ideal) Inhalation Titrated ABIEL Stubbs 50 ng/kg/min (01/06/22 0734)    famotidine  20 mg Oral Daily Carlos Rose MD      insulin glargine  30 Units Subcutaneous HS Abi Lockwood MD      insulin lispro  1-5 Units Subcutaneous 4x Daily (AC & HS) Sun Culp MD      lidocaine  1 patch Topical Daily Carlos Rose MD      melatonin  6 mg Oral HS Carlos Rose MD      ondansetron  4 mg Intravenous Q6H PRN Carlos Rose MD      QUEtiapine  25 mg Oral HS Precious Wu MD      sodium chloride  4 mL Nebulization Q6H Rosiland Fat, CRNP Continuous Infusions:  dexmedetomidine, 0 1-0 7 mcg/kg/hr, Last Rate: 0 7 mcg/kg/hr (01/05/22 1000)  epoprostenol, 6 25-50 ng/kg/min (Ideal), Last Rate: 50 ng/kg/min (01/06/22 0734)      PRN Meds:   acetaminophen, 650 mg, Q6H PRN  benzonatate, 100 mg, TID PRN  calcium carbonate, 500 mg, BID PRN  dextromethorphan-guaiFENesin, 10 mL, Q4H PRN  ondansetron, 4 mg, Q6H PRN        Invasive Devices Review  Invasive Devices  Report    Peripheral Intravenous Line            Peripheral IV 12/29/21 Left;Upper;Ventral (anterior) Arm 7 days    Peripheral IV 01/01/22 Left Wrist 4 days                Rationale for remaining devices: IV access  ---------------------------------------------------------------------------------------  Advance Directive and Living Will:      Power of :    POLST:    ---------------------------------------------------------------------------------------  Care Time Delivered:   No Critical Care time spent       Ricardo Nunez MD      Portions of the record may have been created with voice recognition software  Occasional wrong word or "sound a like" substitutions may have occurred due to the inherent limitations of voice recognition software    Read the chart carefully and recognize, using context, where substitutions have occurred

## 2022-01-06 NOTE — ASSESSMENT & PLAN NOTE
Patient presented with bilateral pneumonia secondary to COVID-19, unvaccinated  Initially was in the [facility] category of disease requiring 15 L mid flow  Continue COVID treatment protocol in escalate to severe disease category  Remdesivir discontinued 12/31   Completed 10 day course of Decadron per protocol  Patient transitioned to therapeutic Lovenox  Ceftriaxone and Doxycycline discontinued on 1/3/21  -->249-->252 5--> 53  Lipitor back to home dose of 20mg   Started on Veletri on 01/03/21  Continue Baricitinib day 10/14

## 2022-01-07 NOTE — ASSESSMENT & PLAN NOTE
Home meds include Losartan and Chlorthalidone  Home meds currently on hold due to low blood pressure  Patient

## 2022-01-07 NOTE — DEATH NOTE
INPATIENT DEATH NOTE  Franki Thompson 67 y o  female MRN: [id number]  Unit/Bed#: ICU 07 Encounter: [id number]             PHYSICAL EXAM:  Unresponsive to noxious stimuli, Spontaneous respirations absent, Breath sounds absent, Carotid pulse absent, Heart sounds absent, Pupillary light reflex absent and Corneal blink reflex absent Time of Death is [address] notification criteria:  Death due to an illness which is a threat to public health (TB, Anthrax, Smallpox, etc ) Covid 23  Medical Examiner's office notified?:  Yes   Medical Examiner accepted case?:  No  Name of Medical Examiner:          Autopsy Options:  Post-mortem examination declined by next of kin    Primary Service Attending Physician notified?:  yes - Attending:  Dr Lilian Apgar    Physician/Resident responsible for completing Discharge Summary:  Natalia Spivey

## 2022-01-07 NOTE — ASSESSMENT & PLAN NOTE
Patient presented with bilateral pneumonia secondary to COVID-19, unvaccinated  Initially was in the [facility] category of disease requiring 15 L mid flow  Continue COVID treatment protocol in escalate to severe disease category  Remdesivir discontinued    Completed 10 day course of Decadron per protocol  Patient transitioned to therapeutic Lovenox  Ceftriaxone and Doxycycline discontinued on 1/3/21  -->249-->252 5--> 53  Lipitor back to home dose of 20mg   Started on Veletri on 21  Continue Baricitinib day 10/14   Patient  at 18:57 on 2022

## 2022-01-07 NOTE — ASSESSMENT & PLAN NOTE
Lab Results   Component Value Date    HGBA1C 9 2 (H) 2021     Recent Labs     22  2135 22  0538 22  0747   POCGLU 216* 177* 158*       Blood Sugar Average: Last 72 hrs:  (P) 122 [id number]   Continue Lantus 20 units q h s    Continue SSI  Continue accuchecks  Avoid hypoglycemia  Patient

## 2022-01-07 NOTE — ASSESSMENT & PLAN NOTE
Patient presented with acute respiratory failure with hypoxia secondary to bilateral COVID pneumonia   Initially requiring 15 L mid flow  Patient was placed on the COVID treatment protocol, and escalated to [facility] category of disease  Currently on BiPAP at 18/, FiO2 100%   Continue COVID treatment as below  Wean O2 as tolerated  Respiratory protocol in place  Airway clearance protocol   Encourage Incentive Spirometry  Patient  185 on 2022

## 2022-01-07 NOTE — ASSESSMENT & PLAN NOTE
Patient presented with sepsis,POA with tachypnea/significant leuckocytosis  Started on the COVID treatment protocol with remdesivir/dexamethasone  Remdesevir discontinued  Antibiotics discontinued after 7 days and Procalcitonin negative x2  Continue trending fever curves and WBC  Maintain MAPs>65  Patient

## 2022-01-07 NOTE — OCCUPATIONAL THERAPY NOTE
Occupational Therapy         Patient Name: Alan Villavicencio  UNRLOEricaQ Date: 1/7/2022          Pt with possible transition to comfort care  Will d/c orders for now   Lionel Essex, OT

## 2022-01-07 NOTE — PROGRESS NOTES
[address] [facility]  Progress Note - Tang Rom [birth year], 67 y o  female MRN: [id number]  Unit/Bed#: ICU 07 Encounter: [id number]  Primary Care Provider: Alicia Griffin MD   Date and time admitted to hospital: 12/28/2021  5:16 PM    * Acute respiratory failure with hypoxia [facility])  Assessment & Plan  Patient presented with acute respiratory failure with hypoxia secondary to bilateral COVID pneumonia   Initially requiring 15 L mid flow  Patient was placed on the COVID treatment protocol, and escalated to [facility] category of disease  Currently on BiPAP at 18/12, FiO2 100%   Continue COVID treatment as below  Wean O2 as tolerated  Respiratory protocol in place  Airway clearance protocol   Encourage Incentive Spirometry          Acute deep vein thrombosis (DVT) of calf muscle vein of right lower extremity ([address] )  Assessment & Plan  POA with D-dimer on admission - 5  15  Patient asymptomatic  Initially placed on heparin gtt  Lower extremity duplex ultrasound performed on 1/3/21 showed right lower limb evidence of acute DVT noted in a soleal calf vein  Transitioned to therapeutic Lovenox, will continue at this time      Pneumonia due to COVID-19 virus  Assessment & Plan  Patient presented with bilateral pneumonia secondary to COVID-19, unvaccinated  Initially was in the [facility] category of disease requiring 15 L mid flow  Continue COVID treatment protocol in escalate to severe disease category  Remdesivir discontinued 12/31   Completed 10 day course of Decadron per protocol  Patient transitioned to therapeutic Lovenox  Ceftriaxone and Doxycycline discontinued on 1/3/21  -->249-->252 5--> 53  Lipitor back to home dose of 20mg   Started on Veletri on 01/03/21  Continue Baricitinib day 10/14         Sepsis [facility])  Assessment & Plan  Patient presented with sepsis,POA with tachypnea/significant leuckocytosis  Started on the COVID treatment protocol with remdesivir/dexamethasone  Remdesevir discontinued  Antibiotics discontinued after 7 days and Procalcitonin negative x2  Continue trending fever curves and WBC  Maintain MAPs>65    Type 2 diabetes mellitus without complication, without long-term current use of insulin [facility])  Assessment & Plan  Lab Results   Component Value Date    HGBA1C 9 2 (H) 05/26/2021     Recent Labs     01/06/22  1546 01/06/22  2135 01/07/22  0538   POCGLU 186* 216* 177*       Blood Sugar Average: Last 72 hrs:  (P) 010 [id number]   Continue Lantus 20 units q h s  Continue SSI  Continue accuchecks  Avoid hypoglycemia    Essential hypertension  Assessment & Plan  Home meds include Losartan and Chlorthalidone  Home meds currently on hold due to low blood pressure        ----------------------------------------------------------------------------------------  HPI/24hr events:  ICU day 10  No acute overnight events  Patient remains BiPAP dependent  BiPAP at 18/12, FiO2 100%  Patient has had poor oral intake secondary to desaturating when she takes off her BiPAP  Patient appropriate for transfer out of the ICU today?: No  Disposition: Continue Critical Care   Code Status: Level 3 - DNAR and DNI  ---------------------------------------------------------------------------------------  SUBJECTIVE  Patient with an episode of unresponsiveness with SpO2 at 69%, not responding to sternal rub initially  After improvement in O2 saturation, she became responsive, however remained obtunded  Respiratory therapy called at the bedside  Review of Systems  Review of systems was unable to be performed secondary to patient obtunded    ---------------------------------------------------------------------------------------  OBJECTIVE    Vitals   Vitals:    01/07/22 0348 01/07/22 0415 01/07/22 0515 01/07/22 0615   BP:  137/70 (!) 85/58 122/73   Pulse:  96 74 88   Resp:  (!) 31 19 (!) 29   Temp:  (!) 97 3 °F (36 3 °C)     TempSrc:  Temporal     SpO2: (!) 89% (!) 74% (!) 84% (!) 79%   Weight: Height:         Temp (24hrs), Av 6 °F (36 4 °C), Min:97 3 °F (36 3 °C), Max:97 8 °F (36 6 °C)  Current: Temperature: (!) 97 3 °F (36 3 °C)          Respiratory:  SpO2: SpO2: (!) 79 %    Nasal Cannula O2 Flow Rate (L/min): 60 L/min    Invasive/non-invasive ventilation settings   Respiratory  Report   Lab Data (Last 4 hours)    None         O2/Vent Data (Last 4 hours)       034          Non-Invasive Ventilation Mode BiPAP                   Physical Exam  Constitutional:       General: She is in acute distress  Appearance: She is ill-appearing  She is not toxic-appearing or diaphoretic  Eyes:      Extraocular Movements: Extraocular movements intact  Pupils: Pupils are equal, round, and reactive to light  Cardiovascular:      Rate and Rhythm: Normal rate and regular rhythm  Pulses: Normal pulses  Heart sounds: Normal heart sounds  Pulmonary:      Effort: Tachypnea and respiratory distress present  Breath sounds: Examination of the left-middle field reveals wheezing  Examination of the left-lower field reveals wheezing  Wheezing present  Abdominal:      General: Bowel sounds are normal       Palpations: Abdomen is soft  Musculoskeletal:      Cervical back: Normal range of motion and neck supple  Right lower leg: No edema  Left lower leg: No edema  Skin:     General: Skin is warm and dry  Coloration: Skin is not cyanotic or pale  Neurological:      Mental Status: She is lethargic               Laboratory and Diagnostics:  Results from last 7 days   Lab Units 22  0539 22  0265 22  0445 22  0531 22  0346 22  0355 22  0451   WBC Thousand/uL 21 90* 23 35* 28 88* 22 13* 19 12* 19 37* 18 97*   HEMOGLOBIN g/dL 13 7 13 5 11 1* 12 8 11 8 11 3* 10 9*   HEMATOCRIT % 43 6 41 9 34 9 39 2 35 6 35 2 32 2*   PLATELETS Thousands/uL 262 276 304 217 182 215 184   NEUTROS PCT %  --  87*  --   --   --  91*  --    MONOS PCT %  --  7  -- --   --  5  --      Results from last 7 days   Lab Units 01/07/22  0539 01/06/22  0648 01/05/22  0716 01/04/22  0531 01/03/22  0346 01/02/22  0355 01/01/22  0426   SODIUM mmol/L 147* 146* 145 144 141 141 141   POTASSIUM mmol/L 4 5 4 4 4 4 4 6 4 8 4 5 5 2   CHLORIDE mmol/L 110* 108 108 108 106 107 107   CO2 mmol/L 27 28 27 29 30 28 28   ANION GAP mmol/L 10 10 10 7 5 6 6   BUN mg/dL 54* 44* 34* 33* 35* 34* 34*   CREATININE mg/dL 1 00 0 99 0 88 0 87 0 79 0 97 0 87   CALCIUM mg/dL 8 0* 8 3 8 1* 8 0* 8 0* 8 0* 7 6*   GLUCOSE RANDOM mg/dL 169* 68 81 103 159* 216* 216*   ALT U/L  --   --  16 18  --   --  19   AST U/L  --   --  21 21  --   --  31   ALK PHOS U/L  --   --  146* 137*  --   --  118*   ALBUMIN g/dL  --   --  1 7* 1 7*  --   --  1 4*   TOTAL BILIRUBIN mg/dL  --   --  0 78 0 65  --   --  0 52          Results from last 7 days   Lab Units 01/04/22  0531 01/03/22  1007 01/03/22  0343 01/02/22  1817 01/02/22  1026 01/02/22  0849 01/02/22  0144   PTT seconds 74* 73* 65* 37 160* >210* 51*              ABG:    VBG:    Results from last 7 days   Lab Units 01/02/22  0358 01/01/22  0708   PROCALCITONIN ng/ml 0 08 0 14       Micro        EKG: Sinus rhythm, heart rate 79  Imaging: I have personally reviewed pertinent reports  and I have personally reviewed pertinent films in PACS    Intake and Output  I/O       01/05 0701 01/06 0700 01/06 0701 01/07 0700 01/07 0701 01/08 0700    P  O        I V  (mL/kg) 205 3 (3 3) 146 3 (2 3)     Total Intake(mL/kg) 205 3 (3 3) 146 3 (2 3)     Urine (mL/kg/hr) 450 (0 3) 650 (0 4)     Total Output 450 650     Net -244 7 -503 7            Unmeasured Urine Occurrence 1 x      Unmeasured Stool Occurrence 1 x            Height and Weights   Height: 4' 11 5" (151 1 cm)     Body mass index is 27 36 kg/m²    Weight (last 2 days)     Date/Time Weight    01/05/22 0437 62 5 (137 79)            Nutrition       Diet Orders   (From admission, onward)             Start     Ordered    01/06/22 5766 Dietary nutrition supplements  Once        Question Answer Comment   Select SupplementIrwin Hall Lunch, Dinner        01/06/22 0956    12/30/21 1234  Diet Dysphagia/Modified Consistency; Dysphagia 2-Mechanical Soft; Thin Liquid; Consistent Carbohydrate Diet Level 1 (4 carb servings/60 grams CHO/meal)  Diet effective now        References:    Nutrtion Support Algorithm Enteral vs  Parenteral   Question Answer Comment   Diet Type Dysphagia/Modified Consistency    Dysphagia/Modified Consistency Dysphagia 2-Mechanical Soft    Liquid Modifier Thin Liquid    Other Restriction(s): Consistent Carbohydrate Diet Level 1 (4 carb servings/60 grams CHO/meal)    RD to adjust diet per protocol?  Yes        12/30/21 1233                  Active Medications  Scheduled Meds:  Current Facility-Administered Medications   Medication Dose Route Frequency Provider Last Rate    acetaminophen  650 mg Oral Q6H PRN Che Boyle MD      aspirin  81 mg Oral Daily Che Boyle MD      atorvastatin  20 mg Oral Daily With Kelly Boeck, MD      Baricitinib  4 mg Oral Q24H Walterine Sink, TREVONNP      benzonatate  100 mg Oral TID PRN Henryleobardo Hyman, CRNP      calcium carbonate  500 mg Oral BID PRN Che Boyle MD      dexamethasone  0 1 mg/kg Intravenous Q12H Deshawn Thomas MD      dexmedetomidine  0 1-0 7 mcg/kg/hr Intravenous Titrated Angie Aguilar MD 0 2 mcg/kg/hr (01/06/22 2036)    dextromethorphan-guaiFENesin  10 mL Oral Q4H PRN TREVON MarinNP      enoxaparin  1 mg/kg Subcutaneous Q12H [facility] Letty Valencia MD      epoprostenol  6 25-50 ng/kg/min (Ideal) Inhalation Titrated Blayne Alto, CRNP 50 ng/kg/min (01/07/22 0346)    famotidine  20 mg Oral Daily Che Boyle MD      insulin glargine  20 Units Subcutaneous HS Letty Valencia MD      insulin lispro  1-5 Units Subcutaneous 4x Daily (AC & HS) Angie Aguilar MD      lactated ringers  1,000 mL Intravenous Once ABIEL Blair      lactated ringers  75 mL/hr Intravenous Continuous Evette Theodore MD      lidocaine  1 patch Topical Daily Eden Domingo MD      melatonin  6 mg Oral HS Eden Domingo MD      ondansetron  4 mg Intravenous Q6H PRN Eden Domingo MD      QUEtiapine  50 mg Oral HS Nicolas Yen MD       Continuous Infusions:  dexmedetomidine, 0 1-0 7 mcg/kg/hr, Last Rate: 0 2 mcg/kg/hr (01/06/22 2036)  epoprostenol, 6 25-50 ng/kg/min (Ideal), Last Rate: 50 ng/kg/min (01/07/22 0346)  lactated ringers, 75 mL/hr      PRN Meds:   acetaminophen, 650 mg, Q6H PRN  benzonatate, 100 mg, TID PRN  calcium carbonate, 500 mg, BID PRN  dextromethorphan-guaiFENesin, 10 mL, Q4H PRN  ondansetron, 4 mg, Q6H PRN        Invasive Devices Review  Invasive Devices  Report    Peripheral Intravenous Line            Peripheral IV 12/29/21 Left;Upper;Ventral (anterior) Arm 8 days    Peripheral IV 01/01/22 Left Wrist 5 days                Rationale for remaining devices: IV access  ---------------------------------------------------------------------------------------  Advance Directive and Living Will:      Power of :    POLST:    ---------------------------------------------------------------------------------------  Care Time Delivered:   No Critical Care time spent       Evette Theodore MD      Portions of the record may have been created with voice recognition software  Occasional wrong word or "sound a like" substitutions may have occurred due to the inherent limitations of voice recognition software    Read the chart carefully and recognize, using context, where substitutions have occurred

## 2022-01-07 NOTE — SPEECH THERAPY NOTE
Speech Language/Pathology  Currently on bipap  Family at bedside  Nurse reported pt may be transitiioning to comfort care  Npt appropriate for PO at this time

## 2022-01-07 NOTE — DISCHARGE SUMMARY
[address] [facility]  Discharge- Nick Vidalesr 1945, 67 y o  female MRN: [id number]  Unit/Bed#: ICU 07 Encounter: [id number]  Primary Care Provider: Sweetie Marin MD   Date and time admitted to hospital: 2021  5:16 PM    * Acute respiratory failure with hypoxia [facility])  Assessment & Plan  Patient presented with acute respiratory failure with hypoxia secondary to bilateral COVID pneumonia   Initially requiring 15 L mid flow  Patient was placed on the COVID treatment protocol, and escalated to [facility] category of disease  Currently on BiPAP at , FiO2 100%   Continue COVID treatment as below  Wean O2 as tolerated  Respiratory protocol in place  Airway clearance protocol   Encourage Incentive Spirometry  Patient   Acute deep vein thrombosis (DVT) of calf muscle vein of right lower extremity (HCC)  Assessment & Plan  POA with D-dimer on admission - 5  15  Patient asymptomatic  Initially placed on heparin gtt  Lower extremity duplex ultrasound performed on 1/3/21 showed right lower limb evidence of acute DVT noted in a soleal calf vein  Transitioned to therapeutic Lovenox, will continue at this time    Patient     Pneumonia due to COVID-19 virus  Assessment & Plan  Patient presented with bilateral pneumonia secondary to COVID-19, unvaccinated  Initially was in the [facility] category of disease requiring 15 L mid flow  Continue COVID treatment protocol in escalate to severe disease category  Remdesivir discontinued    Completed 10 day course of Decadron per protocol  Patient transitioned to therapeutic Lovenox  Ceftriaxone and Doxycycline discontinued on 1/3/21  -->249-->252 5--> 53  Lipitor back to home dose of 20mg   Started on Veletri on 21  Continue Baricitinib day 10/14   Patient         Sepsis [facility])  Assessment & Plan  Patient presented with sepsis,POA with tachypnea/significant leuckocytosis  Started on the COVID treatment protocol with remdesivir/dexamethasone  Remdesevir discontinued  Antibiotics discontinued after 7 days and Procalcitonin negative x2  Continue trending fever curves and WBC  Maintain MAPs>65  Patient   Type 2 diabetes mellitus without complication, without long-term current use of insulin [facility])  Assessment & Plan  Lab Results   Component Value Date    HGBA1C 9 2 (H) 2021     Recent Labs     22  2135 22  0538 22  0747   POCGLU 216* 177* 158*       Blood Sugar Average: Last 72 hrs:  (P) 122 [id number]   Continue Lantus 20 units q h s  Continue SSI  Continue accuchecks  Avoid hypoglycemia  Patient     Essential hypertension  Assessment & Plan  Home meds include Losartan and Chlorthalidone  Home meds currently on hold due to low blood pressure  Patient           Medical Problems             Resolved Problems  Date Reviewed: 2022    None                Admission Date:   Admission Orders (From admission, onward)     Ordered        21  Inpatient Admission  Once                        Admitting Diagnosis: Oral candidiasis [B37 0]  SOB (shortness of breath) [R06 02]  Acute respiratory failure with hypoxia (HCC) [J96 01]  Pneumonia due to COVID-19 virus [U07 1, J12 82]    HPI: Patient is a 67year old female with a PMH of hypertension, type 2 DM who presented to the hospital for worsening shortness of breath and cough  Patient tested positive for COVID-19 on 21  At the time of admission she also reported abdominal pain and decreased oral intake with intermittent dysphagia with globus sensation to solid foods  On presentation to the ED, patient was noted to be hypoxic with SpO2 in the mid 80%s  Was placed on 15L/min of midflow with the addition of NRB  She was admitted and treatment was started on the moderate COVID protocol with Remdesevir, Baricitinim, Decadron, Doxycycline and Ceftriaxone   D-dimers were noted to be elevated to 5 15 and patient was also started on a heparin drip  Due to worsening oxygen needs, patient was placed on HFNC and  transferred to the ICU on hospital day 2 for closer monitoring  Procedures Performed:   Orders Placed This Encounter   Procedures   [facility] [email or web address]) [facility] ED ECG Documentation Only       Summary of Hospital Course:   During this admission, patient completed a total of 7 days of antibiotics  She received 10 days of Dexamethasone and 10 days of Baricitinib  Duplex ultrasound of the lower extremities performed on  revealed an acute DVT in a soleal calf vein in the right lower limb and patient was transitioned from heparin drip to therapeutic Enoxaparin  Unfortunately patient's respiratory status continued to deteriorate despite aggressive treatment  Significant Findings, Care, Treatment and Services Provided:   XR chest 1 view portable    Result Date: 2021  Impression: Diffuse groundglass opacities are suspicious for Covid 19 pneumonia  Pulmonary edema would be less likely  : Pneumonia was noted on the ER preliminary result Workstation performed: [id number]     CTA chest pe study    Result Date: 2021  Impression: 1  There is no pulmonary embolus 2  Severe bilateral pulmonary airspace disease in this patient with known COVID 19 infection  3  Trace bilateral effusions  No pneumothorax Workstation performed: [id number]     VAS Duplex ultrasound 22  RIGHT LOWER LIMB:  Evidence of acute deep vein thrombosis noted in a soleal calf vein        Lab Results   Component Value Date    SARSCOV2 Positive (A) 2021     Lab Results   Component Value Date    DDIMER 5 43 (H) 2021    DDIMER 10 35 (H) 2021    DDIMER 5 15 (H)        Complications:  [id number] pneumonia    Condition at Time of Death:      Final Diagnosis: COVID-19 Pneumonia        PCP: Sweetie Marin MD    Disposition:

## 2022-01-07 NOTE — PLAN OF CARE
Problem: MOBILITY - ADULT  Goal: Maintain or return to baseline ADL function  Description: INTERVENTIONS:  -  Assess patient's ability to carry out ADLs; assess patient's baseline for ADL function and identify physical deficits which impact ability to perform ADLs (bathing, care of mouth/teeth, toileting, grooming, dressing, etc )  - Assess/evaluate cause of self-care deficits   - Assess range of motion  - Assess patient's mobility; develop plan if impaired  - Assess patient's need for assistive devices and provide as appropriate  - Encourage maximum independence but intervene and supervise when necessary  - Involve family in performance of ADLs  - Assess for home care needs following discharge   - Consider OT consult to assist with ADL evaluation and planning for discharge  - Provide patient education as appropriate  Outcome: Progressing  Goal: Maintains/Returns to pre admission functional level  Description: INTERVENTIONS:  - Perform BMAT or MOVE assessment daily    - Set and communicate daily mobility goal to care team and patient/family/caregiver     - Collaborate with rehabilitation services on mobility goals if consulted  - Out of bed for toileting  - Record patient progress and toleration of activity level   Outcome: Progressing     Problem: Potential for Falls  Goal: Patient will remain free of falls  Description: INTERVENTIONS:  - Educate patient/family on patient safety including physical limitations  - Instruct patient to call for assistance with activity   - Consult OT/PT to assist with strengthening/mobility   - Keep Call bell within reach  - Keep bed low and locked with side rails adjusted as appropriate  - Keep care items and personal belongings within reach  - Initiate and maintain comfort rounds  - Make Fall Risk Sign visible to staff  - Apply yellow socks and bracelet for high fall risk patients  - Consider moving patient to room near nurses station  Outcome: Progressing     Problem: PAIN - ADULT  Goal: Verbalizes/displays adequate comfort level or baseline comfort level  Description: Interventions:  - Encourage patient to monitor pain and request assistance  - Assess pain using appropriate pain scale  - Administer analgesics based on type and severity of pain and evaluate response  - Implement non-pharmacological measures as appropriate and evaluate response  - Consider cultural and social influences on pain and pain management  - Notify physician/advanced practitioner if interventions unsuccessful or patient reports new pain  Outcome: Progressing     Problem: INFECTION - ADULT  Goal: Absence or prevention of progression during hospitalization  Description: INTERVENTIONS:  - Assess and monitor for signs and symptoms of infection  - Monitor lab/diagnostic results  - Monitor all insertion sites, i e  indwelling lines, tubes, and drains  - Monitor endotracheal if appropriate and nasal secretions for changes in amount and color  - [place] appropriate cooling/warming therapies per order  - Administer medications as ordered  - Instruct and encourage patient and family to use good hand hygiene technique  - Identify and instruct in appropriate isolation precautions for identified infection/condition  Outcome: Progressing  Goal: Absence of fever/infection during neutropenic period  Description: INTERVENTIONS:  - Monitor WBC    Outcome: Progressing     Problem: SAFETY ADULT  Goal: Maintain or return to baseline ADL function  Description: INTERVENTIONS:  -  Assess patient's ability to carry out ADLs; assess patient's baseline for ADL function and identify physical deficits which impact ability to perform ADLs (bathing, care of mouth/teeth, toileting, grooming, dressing, etc )  - Assess/evaluate cause of self-care deficits   - Assess range of motion  - Assess patient's mobility; develop plan if impaired  - Assess patient's need for assistive devices and provide as appropriate  - Encourage maximum independence but intervene and supervise when necessary  - Involve family in performance of ADLs  - Assess for home care needs following discharge   - Consider OT consult to assist with ADL evaluation and planning for discharge  - Provide patient education as appropriate  Outcome: Progressing  Goal: Maintains/Returns to pre admission functional level  Description: INTERVENTIONS:  - Perform BMAT or MOVE assessment daily    - Set and communicate daily mobility goal to care team and patient/family/caregiver     - Collaborate with rehabilitation services on mobility goals if consulted  - Out of bed for toileting  - Record patient progress and toleration of activity level   Outcome: Progressing  Goal: Patient will remain free of falls  Description: INTERVENTIONS:  - Educate patient/family on patient safety including physical limitations  - Instruct patient to call for assistance with activity   - Consult OT/PT to assist with strengthening/mobility   - Keep Call bell within reach  - Keep bed low and locked with side rails adjusted as appropriate  - Keep care items and personal belongings within reach  - Initiate and maintain comfort rounds  - Make Fall Risk Sign visible to staff  - Apply yellow socks and bracelet for high fall risk patients  - Consider moving patient to room near nurses station  Outcome: Progressing     Problem: DISCHARGE PLANNING  Goal: Discharge to home or other facility with appropriate resources  Description: INTERVENTIONS:  - Identify barriers to discharge w/patient and caregiver  - Arrange for needed discharge resources and transportation as appropriate  - Identify discharge learning needs (meds, wound care, etc )  - Arrange for interpretive services to assist at discharge as needed  - Refer to Case Management Department for coordinating discharge planning if the patient needs post-hospital services based on physician/advanced practitioner order or complex needs related to functional status, cognitive ability, or social support system  Outcome: Progressing     Problem: Knowledge Deficit  Goal: Patient/family/caregiver demonstrates understanding of disease process, treatment plan, medications, and discharge instructions  Description: Complete learning assessment and assess knowledge base  Interventions:  - Provide teaching at level of understanding  - Provide teaching via preferred learning methods  Outcome: Progressing     Problem: Prexisting or High Potential for Compromised Skin Integrity  Goal: Skin integrity is maintained or improved  Description: INTERVENTIONS:  - Identify patients at risk for skin breakdown  - Assess and monitor skin integrity  - Assess and monitor nutrition and hydration status  - Monitor labs   - Assess for incontinence   - Turn and reposition patient  - Assist with mobility/ambulation  - Relieve pressure over bony prominences  - Avoid friction and shearing  - Provide appropriate hygiene as needed including keeping skin clean and dry  - Evaluate need for skin moisturizer/barrier cream  - Collaborate with interdisciplinary team   - Patient/family teaching  - Consider wound care consult   Outcome: Progressing     Problem: Nutrition/Hydration-ADULT  Goal: Nutrient/Hydration intake appropriate for improving, restoring or maintaining nutritional needs  Description: Monitor and assess patient's nutrition/hydration status for malnutrition  Collaborate with interdisciplinary team and initiate plan and interventions as ordered  Monitor patient's weight and dietary intake as ordered or per policy  Utilize nutrition screening tool and intervene as necessary  Determine patient's food preferences and provide high-protein, high-caloric foods as appropriate       INTERVENTIONS:  - Monitor oral intake, urinary output, labs, and treatment plans  - Assess nutrition and hydration status and recommend course of action  - Evaluate amount of meals eaten  - Assist patient with eating if necessary   - Allow adequate time for meals  - Recommend/ encourage appropriate diets, oral nutritional supplements, and vitamin/mineral supplements  - Order, calculate, and assess calorie counts as needed  - Recommend, monitor, and adjust tube feedings and TPN/PPN based on assessed needs  - Assess need for intravenous fluids  - Provide specific nutrition/hydration education as appropriate  - Include patient/family/caregiver in decisions related to nutrition  Outcome: Progressing

## 2022-01-07 NOTE — ASSESSMENT & PLAN NOTE
POA with D-dimer on admission - 5  15  Patient asymptomatic  Initially placed on heparin gtt  Lower extremity duplex ultrasound performed on 1/3/21 showed right lower limb evidence of acute DVT noted in a soleal calf vein  Transitioned to therapeutic Lovenox, will continue at this time    Patient